# Patient Record
Sex: MALE | Race: OTHER | HISPANIC OR LATINO | Employment: OTHER | URBAN - METROPOLITAN AREA
[De-identification: names, ages, dates, MRNs, and addresses within clinical notes are randomized per-mention and may not be internally consistent; named-entity substitution may affect disease eponyms.]

---

## 2019-05-14 ENCOUNTER — APPOINTMENT (EMERGENCY)
Dept: CT IMAGING | Facility: HOSPITAL | Age: 79
End: 2019-05-14
Payer: MEDICARE

## 2019-05-14 ENCOUNTER — HOSPITAL ENCOUNTER (EMERGENCY)
Facility: HOSPITAL | Age: 79
End: 2019-05-14
Attending: EMERGENCY MEDICINE
Payer: MEDICARE

## 2019-05-14 ENCOUNTER — APPOINTMENT (EMERGENCY)
Dept: RADIOLOGY | Facility: HOSPITAL | Age: 79
End: 2019-05-14
Payer: MEDICARE

## 2019-05-14 VITALS
OXYGEN SATURATION: 95 % | HEART RATE: 74 BPM | RESPIRATION RATE: 21 BRPM | DIASTOLIC BLOOD PRESSURE: 69 MMHG | WEIGHT: 180 LBS | TEMPERATURE: 97 F | SYSTOLIC BLOOD PRESSURE: 113 MMHG

## 2019-05-14 DIAGNOSIS — D63.8 ANEMIA, CHRONIC DISEASE: ICD-10-CM

## 2019-05-14 DIAGNOSIS — R19.5 HEME POSITIVE STOOL: ICD-10-CM

## 2019-05-14 DIAGNOSIS — I21.4 NSTEMI (NON-ST ELEVATED MYOCARDIAL INFARCTION) (HCC): ICD-10-CM

## 2019-05-14 DIAGNOSIS — I50.9 CHF (CONGESTIVE HEART FAILURE) (HCC): ICD-10-CM

## 2019-05-14 DIAGNOSIS — C90.00 MULTIPLE MYELOMA (HCC): ICD-10-CM

## 2019-05-14 DIAGNOSIS — R09.02 HYPOXIA: ICD-10-CM

## 2019-05-14 DIAGNOSIS — I95.9 HYPOTENSION: ICD-10-CM

## 2019-05-14 DIAGNOSIS — R53.1 GENERALIZED WEAKNESS: Primary | ICD-10-CM

## 2019-05-14 LAB
ALBUMIN SERPL BCP-MCNC: 2.9 G/DL (ref 3–5.2)
ALP SERPL-CCNC: 49 U/L (ref 43–122)
ALT SERPL W P-5'-P-CCNC: 18 U/L (ref 9–52)
ANION GAP SERPL CALCULATED.3IONS-SCNC: 9 MMOL/L (ref 5–14)
ANISOCYTOSIS BLD QL SMEAR: PRESENT
APTT PPP: 28 SECONDS (ref 23–34)
AST SERPL W P-5'-P-CCNC: 17 U/L (ref 17–59)
BILIRUB SERPL-MCNC: 0.5 MG/DL
BUN SERPL-MCNC: 57 MG/DL (ref 5–25)
CALCIUM SERPL-MCNC: 8.4 MG/DL (ref 8.4–10.2)
CHLORIDE SERPL-SCNC: 102 MMOL/L (ref 97–108)
CO2 SERPL-SCNC: 22 MMOL/L (ref 22–30)
CREAT SERPL-MCNC: 1.67 MG/DL (ref 0.7–1.5)
ERYTHROCYTE [DISTWIDTH] IN BLOOD BY AUTOMATED COUNT: 21.5 %
GFR SERPL CREATININE-BSD FRML MDRD: 39 ML/MIN/1.73SQ M
GLUCOSE SERPL-MCNC: 98 MG/DL (ref 70–99)
HCT VFR BLD AUTO: 26.9 % (ref 41–53)
HGB BLD-MCNC: 8.2 G/DL (ref 13.5–17.5)
HYPERCHROMIA BLD QL SMEAR: PRESENT
INR PPP: 1.12 (ref 0.89–1.1)
LYMPHOCYTES # BLD AUTO: 0.12 THOUSAND/UL (ref 0.5–4)
LYMPHOCYTES # BLD AUTO: 3 % (ref 25–45)
MCH RBC QN AUTO: 31.6 PG (ref 26–34)
MCHC RBC AUTO-ENTMCNC: 30.3 G/DL (ref 31–36)
MCV RBC AUTO: 104 FL (ref 80–100)
METAMYELOCYTES NFR BLD MANUAL: 2 % (ref 0–1)
MONOCYTES # BLD AUTO: 0.24 THOUSAND/UL (ref 0.2–0.9)
MONOCYTES NFR BLD AUTO: 6 % (ref 1–10)
NEUTS BAND NFR BLD MANUAL: 30 % (ref 0–8)
NEUTS SEG # BLD: 3.56 THOUSAND/UL (ref 1.8–7.8)
NEUTS SEG NFR BLD AUTO: 59 %
NRBC BLD AUTO-RTO: 1 /100 WBC (ref 0–2)
NT-PROBNP SERPL-MCNC: 1740 PG/ML (ref 0–299)
PLATELET # BLD AUTO: 88 THOUSANDS/UL (ref 150–450)
PLATELET BLD QL SMEAR: ABNORMAL
PMV BLD AUTO: 11.6 FL (ref 8.9–12.7)
POIKILOCYTOSIS BLD QL SMEAR: PRESENT
POTASSIUM SERPL-SCNC: 3.8 MMOL/L (ref 3.6–5)
PROT SERPL-MCNC: 6.5 G/DL (ref 5.9–8.4)
PROTHROMBIN TIME: 11.8 SECONDS (ref 9.5–11.6)
RBC # BLD AUTO: 2.58 MILLION/UL (ref 4.5–5.9)
RBC MORPH BLD: ABNORMAL
SODIUM SERPL-SCNC: 133 MMOL/L (ref 137–147)
TOTAL CELLS COUNTED SPEC: 100
TROPONIN I SERPL-MCNC: 0.1 NG/ML (ref 0–0.03)
WBC # BLD AUTO: 4 THOUSAND/UL (ref 4.5–11)
WBC TOXIC VACUOLES BLD QL SMEAR: PRESENT

## 2019-05-14 PROCEDURE — 36415 COLL VENOUS BLD VENIPUNCTURE: CPT | Performed by: EMERGENCY MEDICINE

## 2019-05-14 PROCEDURE — 70450 CT HEAD/BRAIN W/O DYE: CPT

## 2019-05-14 PROCEDURE — 83880 ASSAY OF NATRIURETIC PEPTIDE: CPT | Performed by: EMERGENCY MEDICINE

## 2019-05-14 PROCEDURE — 85610 PROTHROMBIN TIME: CPT | Performed by: EMERGENCY MEDICINE

## 2019-05-14 PROCEDURE — 80053 COMPREHEN METABOLIC PANEL: CPT | Performed by: EMERGENCY MEDICINE

## 2019-05-14 PROCEDURE — 85027 COMPLETE CBC AUTOMATED: CPT | Performed by: EMERGENCY MEDICINE

## 2019-05-14 PROCEDURE — 84484 ASSAY OF TROPONIN QUANT: CPT | Performed by: EMERGENCY MEDICINE

## 2019-05-14 PROCEDURE — 71045 X-RAY EXAM CHEST 1 VIEW: CPT

## 2019-05-14 PROCEDURE — 85007 BL SMEAR W/DIFF WBC COUNT: CPT | Performed by: EMERGENCY MEDICINE

## 2019-05-14 PROCEDURE — 93005 ELECTROCARDIOGRAM TRACING: CPT

## 2019-05-14 PROCEDURE — 99291 CRITICAL CARE FIRST HOUR: CPT

## 2019-05-14 PROCEDURE — 85730 THROMBOPLASTIN TIME PARTIAL: CPT | Performed by: EMERGENCY MEDICINE

## 2019-05-14 PROCEDURE — 99285 EMERGENCY DEPT VISIT HI MDM: CPT | Performed by: EMERGENCY MEDICINE

## 2019-05-14 PROCEDURE — 96361 HYDRATE IV INFUSION ADD-ON: CPT

## 2019-05-14 PROCEDURE — 96374 THER/PROPH/DIAG INJ IV PUSH: CPT

## 2019-05-14 RX ORDER — LANOLIN ALCOHOL/MO/W.PET/CERES
1 CREAM (GRAM) TOPICAL DAILY
COMMUNITY

## 2019-05-14 RX ORDER — ASPIRIN 81 MG/1
81 TABLET, CHEWABLE ORAL DAILY
COMMUNITY

## 2019-05-14 RX ORDER — PROCHLORPERAZINE MALEATE 10 MG
10 TABLET ORAL EVERY 6 HOURS PRN
COMMUNITY
Start: 2019-04-29 | End: 2021-05-05

## 2019-05-14 RX ORDER — DEXAMETHASONE 4 MG/1
TABLET ORAL
COMMUNITY
Start: 2019-03-19 | End: 2021-05-05

## 2019-05-14 RX ORDER — FUROSEMIDE 10 MG/ML
40 INJECTION INTRAMUSCULAR; INTRAVENOUS ONCE
Status: DISCONTINUED | OUTPATIENT
Start: 2019-05-14 | End: 2019-05-14

## 2019-05-14 RX ORDER — FUROSEMIDE 10 MG/ML
20 INJECTION INTRAMUSCULAR; INTRAVENOUS ONCE
Status: COMPLETED | OUTPATIENT
Start: 2019-05-14 | End: 2019-05-14

## 2019-05-14 RX ORDER — ACYCLOVIR 400 MG/1
400 TABLET ORAL 2 TIMES DAILY
COMMUNITY
Start: 2018-12-17 | End: 2021-05-05

## 2019-05-14 RX ORDER — MORPHINE SULFATE 15 MG/1
TABLET, FILM COATED, EXTENDED RELEASE ORAL 2 TIMES DAILY
COMMUNITY
Start: 2019-04-26 | End: 2021-05-05

## 2019-05-14 RX ORDER — OMEPRAZOLE 40 MG/1
40 CAPSULE, DELAYED RELEASE ORAL DAILY
COMMUNITY
Start: 2019-04-27 | End: 2021-05-05

## 2019-05-14 RX ADMIN — FUROSEMIDE 20 MG: 10 INJECTION, SOLUTION INTRAMUSCULAR; INTRAVENOUS at 15:57

## 2019-05-14 RX ADMIN — SODIUM CHLORIDE 1000 ML: 9 INJECTION, SOLUTION INTRAVENOUS at 11:40

## 2019-05-15 LAB
ATRIAL RATE: 83 BPM
P AXIS: 13 DEGREES
PR INTERVAL: 142 MS
QRS AXIS: 20 DEGREES
QRSD INTERVAL: 92 MS
QT INTERVAL: 390 MS
QTC INTERVAL: 458 MS
T WAVE AXIS: 6 DEGREES
VENTRICULAR RATE: 83 BPM

## 2019-05-15 PROCEDURE — 93010 ELECTROCARDIOGRAM REPORT: CPT | Performed by: INTERNAL MEDICINE

## 2021-02-15 ENCOUNTER — HOSPITAL ENCOUNTER (INPATIENT)
Facility: HOSPITAL | Age: 81
LOS: 1 days | Discharge: HOME | DRG: 309 | End: 2021-02-16
Attending: INTERNAL MEDICINE | Admitting: INTERNAL MEDICINE
Payer: MEDICARE

## 2021-02-15 ENCOUNTER — APPOINTMENT (EMERGENCY)
Dept: RADIOLOGY | Facility: HOSPITAL | Age: 81
DRG: 309 | End: 2021-02-15
Payer: MEDICARE

## 2021-02-15 ENCOUNTER — HOSPITAL ENCOUNTER (EMERGENCY)
Facility: HOSPITAL | Age: 81
DRG: 309 | End: 2021-02-15
Attending: EMERGENCY MEDICINE
Payer: MEDICARE

## 2021-02-15 VITALS
TEMPERATURE: 98.1 F | DIASTOLIC BLOOD PRESSURE: 80 MMHG | HEART RATE: 72 BPM | OXYGEN SATURATION: 100 % | RESPIRATION RATE: 16 BRPM | SYSTOLIC BLOOD PRESSURE: 142 MMHG | WEIGHT: 179.2 LBS

## 2021-02-15 DIAGNOSIS — I48.91 ATRIAL FIBRILLATION WITH RVR (HCC): Primary | ICD-10-CM

## 2021-02-15 DIAGNOSIS — I47.2 VENTRICULAR TACHYARRHYTHMIA (HCC): Primary | ICD-10-CM

## 2021-02-15 LAB
ANION GAP SERPL CALCULATED.3IONS-SCNC: 6 MMOL/L (ref 5–14)
APTT PPP: 30 SECONDS (ref 23–37)
BUN SERPL-MCNC: 37 MG/DL (ref 5–25)
CALCIUM SERPL-MCNC: 9.3 MG/DL (ref 8.4–10.2)
CHLORIDE SERPL-SCNC: 105 MMOL/L (ref 97–108)
CO2 SERPL-SCNC: 29 MMOL/L (ref 22–30)
CREAT SERPL-MCNC: 1.32 MG/DL (ref 0.7–1.5)
EOSINOPHIL # BLD AUTO: 0.2 THOUSAND/UL (ref 0–0.4)
EOSINOPHIL NFR BLD MANUAL: 3 % (ref 0–6)
ERYTHROCYTE [DISTWIDTH] IN BLOOD BY AUTOMATED COUNT: 17.5 %
GFR SERPL CREATININE-BSD FRML MDRD: 51 ML/MIN/1.73SQ M
GLUCOSE SERPL-MCNC: 100 MG/DL (ref 70–99)
HCT VFR BLD AUTO: 30.9 % (ref 41–53)
HGB BLD-MCNC: 9.9 G/DL (ref 13.5–17.5)
INR PPP: 0.98 (ref 0.84–1.19)
LYMPHOCYTES # BLD AUTO: 2.01 THOUSAND/UL (ref 0.5–4)
LYMPHOCYTES # BLD AUTO: 30 % (ref 25–45)
MCH RBC QN AUTO: 29 PG (ref 26–34)
MCHC RBC AUTO-ENTMCNC: 32.1 G/DL (ref 31–36)
MCV RBC AUTO: 91 FL (ref 80–100)
MONOCYTES # BLD AUTO: 1.01 THOUSAND/UL (ref 0.2–0.9)
MONOCYTES NFR BLD AUTO: 15 % (ref 1–10)
NEUTS BAND NFR BLD MANUAL: 1 % (ref 0–8)
NEUTS SEG # BLD: 3.48 THOUSAND/UL (ref 1.8–7.8)
NEUTS SEG NFR BLD AUTO: 51 %
NT-PROBNP SERPL-MCNC: 449 PG/ML (ref 0–299)
PLATELET # BLD AUTO: 256 THOUSANDS/UL (ref 150–450)
PLATELET BLD QL SMEAR: ADEQUATE
PMV BLD AUTO: 9.6 FL (ref 8.9–12.7)
POTASSIUM SERPL-SCNC: 4.4 MMOL/L (ref 3.6–5)
PROTHROMBIN TIME: 13.1 SECONDS (ref 11.6–14.5)
RBC # BLD AUTO: 3.42 MILLION/UL (ref 4.5–5.9)
RBC MORPH BLD: NORMAL
SODIUM SERPL-SCNC: 140 MMOL/L (ref 137–147)
TOTAL CELLS COUNTED SPEC: 100
TROPONIN I SERPL-MCNC: <0.01 NG/ML (ref 0–0.03)
WBC # BLD AUTO: 6.7 THOUSAND/UL (ref 4.5–11)

## 2021-02-15 PROCEDURE — 99285 EMERGENCY DEPT VISIT HI MDM: CPT | Performed by: EMERGENCY MEDICINE

## 2021-02-15 PROCEDURE — 36415 COLL VENOUS BLD VENIPUNCTURE: CPT | Performed by: EMERGENCY MEDICINE

## 2021-02-15 PROCEDURE — 80048 BASIC METABOLIC PNL TOTAL CA: CPT | Performed by: EMERGENCY MEDICINE

## 2021-02-15 PROCEDURE — 84484 ASSAY OF TROPONIN QUANT: CPT | Performed by: EMERGENCY MEDICINE

## 2021-02-15 PROCEDURE — 96365 THER/PROPH/DIAG IV INF INIT: CPT

## 2021-02-15 PROCEDURE — 85730 THROMBOPLASTIN TIME PARTIAL: CPT | Performed by: EMERGENCY MEDICINE

## 2021-02-15 PROCEDURE — 93005 ELECTROCARDIOGRAM TRACING: CPT

## 2021-02-15 PROCEDURE — 0241U HB NFCT DS VIR RESP RNA 4 TRGT: CPT | Performed by: PHYSICIAN ASSISTANT

## 2021-02-15 PROCEDURE — 85007 BL SMEAR W/DIFF WBC COUNT: CPT | Performed by: EMERGENCY MEDICINE

## 2021-02-15 PROCEDURE — 85610 PROTHROMBIN TIME: CPT | Performed by: EMERGENCY MEDICINE

## 2021-02-15 PROCEDURE — 96366 THER/PROPH/DIAG IV INF ADDON: CPT

## 2021-02-15 PROCEDURE — 83880 ASSAY OF NATRIURETIC PEPTIDE: CPT | Performed by: EMERGENCY MEDICINE

## 2021-02-15 PROCEDURE — 85027 COMPLETE CBC AUTOMATED: CPT | Performed by: EMERGENCY MEDICINE

## 2021-02-15 PROCEDURE — 99285 EMERGENCY DEPT VISIT HI MDM: CPT

## 2021-02-15 PROCEDURE — 71045 X-RAY EXAM CHEST 1 VIEW: CPT

## 2021-02-15 PROCEDURE — 96367 TX/PROPH/DG ADDL SEQ IV INF: CPT

## 2021-02-15 RX ORDER — SENNA AND DOCUSATE SODIUM 50; 8.6 MG/1; MG/1
1 TABLET, FILM COATED ORAL DAILY
COMMUNITY
End: 2021-05-05

## 2021-02-15 RX ORDER — MAGNESIUM SULFATE HEPTAHYDRATE 40 MG/ML
2 INJECTION, SOLUTION INTRAVENOUS ONCE
Status: COMPLETED | OUTPATIENT
Start: 2021-02-15 | End: 2021-02-15

## 2021-02-15 RX ORDER — OXYCODONE HYDROCHLORIDE AND ACETAMINOPHEN 5; 325 MG/1; MG/1
1 TABLET ORAL EVERY 4 HOURS PRN
COMMUNITY
End: 2021-05-05

## 2021-02-15 RX ADMIN — DEXTROSE 240 MG: 50 INJECTION, SOLUTION INTRAVENOUS at 20:37

## 2021-02-15 RX ADMIN — AMIODARONE HYDROCHLORIDE 1 MG/MIN: 50 INJECTION, SOLUTION INTRAVENOUS at 21:05

## 2021-02-15 RX ADMIN — MAGNESIUM SULFATE IN WATER 2 G: 40 INJECTION, SOLUTION INTRAVENOUS at 21:12

## 2021-02-16 ENCOUNTER — APPOINTMENT (INPATIENT)
Dept: NON INVASIVE DIAGNOSTICS | Facility: HOSPITAL | Age: 81
DRG: 309 | End: 2021-02-16
Payer: MEDICARE

## 2021-02-16 VITALS
SYSTOLIC BLOOD PRESSURE: 150 MMHG | OXYGEN SATURATION: 96 % | RESPIRATION RATE: 18 BRPM | BODY MASS INDEX: 29.78 KG/M2 | WEIGHT: 161.82 LBS | DIASTOLIC BLOOD PRESSURE: 79 MMHG | HEIGHT: 62 IN | HEART RATE: 65 BPM | TEMPERATURE: 97.6 F

## 2021-02-16 PROBLEM — I48.0 PAROXYSMAL ATRIAL FIBRILLATION (HCC): Status: ACTIVE | Noted: 2021-02-16

## 2021-02-16 PROBLEM — K21.9 HIATAL HERNIA WITH GERD: Status: ACTIVE | Noted: 2017-05-16

## 2021-02-16 PROBLEM — I49.3 FREQUENT PVCS: Status: ACTIVE | Noted: 2021-02-16

## 2021-02-16 PROBLEM — I10 ESSENTIAL HYPERTENSION: Status: ACTIVE | Noted: 2019-01-25

## 2021-02-16 PROBLEM — K44.9 HIATAL HERNIA WITH GERD: Status: ACTIVE | Noted: 2017-05-16

## 2021-02-16 LAB
ALBUMIN SERPL BCP-MCNC: 3.3 G/DL (ref 3.5–5)
ALP SERPL-CCNC: 60 U/L (ref 46–116)
ALT SERPL W P-5'-P-CCNC: 17 U/L (ref 12–78)
ANION GAP SERPL CALCULATED.3IONS-SCNC: 4 MMOL/L (ref 4–13)
ANION GAP SERPL CALCULATED.3IONS-SCNC: 4 MMOL/L (ref 4–13)
AST SERPL W P-5'-P-CCNC: 13 U/L (ref 5–45)
ATRIAL RATE: 106 BPM
BACTERIA UR QL AUTO: ABNORMAL /HPF
BASOPHILS # BLD AUTO: 0.02 THOUSANDS/ΜL (ref 0–0.1)
BASOPHILS # BLD AUTO: 0.02 THOUSANDS/ΜL (ref 0–0.1)
BASOPHILS NFR BLD AUTO: 0 % (ref 0–1)
BASOPHILS NFR BLD AUTO: 0 % (ref 0–1)
BILIRUB SERPL-MCNC: 0.26 MG/DL (ref 0.2–1)
BILIRUB UR QL STRIP: NEGATIVE
BUN SERPL-MCNC: 27 MG/DL (ref 5–25)
BUN SERPL-MCNC: 30 MG/DL (ref 5–25)
CALCIUM ALBUM COR SERPL-MCNC: 9.6 MG/DL (ref 8.3–10.1)
CALCIUM SERPL-MCNC: 8.6 MG/DL (ref 8.3–10.1)
CALCIUM SERPL-MCNC: 9 MG/DL (ref 8.3–10.1)
CHLORIDE SERPL-SCNC: 108 MMOL/L (ref 100–108)
CHLORIDE SERPL-SCNC: 111 MMOL/L (ref 100–108)
CLARITY UR: CLEAR
CO2 SERPL-SCNC: 27 MMOL/L (ref 21–32)
CO2 SERPL-SCNC: 28 MMOL/L (ref 21–32)
COLOR UR: YELLOW
CREAT SERPL-MCNC: 1.05 MG/DL (ref 0.6–1.3)
CREAT SERPL-MCNC: 1.2 MG/DL (ref 0.6–1.3)
EOSINOPHIL # BLD AUTO: 0.21 THOUSAND/ΜL (ref 0–0.61)
EOSINOPHIL # BLD AUTO: 0.25 THOUSAND/ΜL (ref 0–0.61)
EOSINOPHIL NFR BLD AUTO: 4 % (ref 0–6)
EOSINOPHIL NFR BLD AUTO: 4 % (ref 0–6)
ERYTHROCYTE [DISTWIDTH] IN BLOOD BY AUTOMATED COUNT: 15.9 % (ref 11.6–15.1)
ERYTHROCYTE [DISTWIDTH] IN BLOOD BY AUTOMATED COUNT: 16 % (ref 11.6–15.1)
EST. AVERAGE GLUCOSE BLD GHB EST-MCNC: 123 MG/DL
FLUAV RNA RESP QL NAA+PROBE: NEGATIVE
FLUBV RNA RESP QL NAA+PROBE: NEGATIVE
GFR SERPL CREATININE-BSD FRML MDRD: 57 ML/MIN/1.73SQ M
GFR SERPL CREATININE-BSD FRML MDRD: 67 ML/MIN/1.73SQ M
GLUCOSE SERPL-MCNC: 106 MG/DL (ref 65–140)
GLUCOSE SERPL-MCNC: 92 MG/DL (ref 65–140)
GLUCOSE UR STRIP-MCNC: NEGATIVE MG/DL
HBA1C MFR BLD: 5.9 %
HCT VFR BLD AUTO: 29.8 % (ref 36.5–49.3)
HCT VFR BLD AUTO: 30.5 % (ref 36.5–49.3)
HGB BLD-MCNC: 9.2 G/DL (ref 12–17)
HGB BLD-MCNC: 9.5 G/DL (ref 12–17)
HGB UR QL STRIP.AUTO: ABNORMAL
HYALINE CASTS #/AREA URNS LPF: ABNORMAL /LPF
IMM GRANULOCYTES # BLD AUTO: 0.01 THOUSAND/UL (ref 0–0.2)
IMM GRANULOCYTES # BLD AUTO: 0.02 THOUSAND/UL (ref 0–0.2)
IMM GRANULOCYTES NFR BLD AUTO: 0 % (ref 0–2)
IMM GRANULOCYTES NFR BLD AUTO: 0 % (ref 0–2)
KETONES UR STRIP-MCNC: NEGATIVE MG/DL
LEUKOCYTE ESTERASE UR QL STRIP: NEGATIVE
LYMPHOCYTES # BLD AUTO: 0.91 THOUSANDS/ΜL (ref 0.6–4.47)
LYMPHOCYTES # BLD AUTO: 0.98 THOUSANDS/ΜL (ref 0.6–4.47)
LYMPHOCYTES NFR BLD AUTO: 15 % (ref 14–44)
LYMPHOCYTES NFR BLD AUTO: 15 % (ref 14–44)
MAGNESIUM SERPL-MCNC: 2.5 MG/DL (ref 1.6–2.6)
MAGNESIUM SERPL-MCNC: 2.6 MG/DL (ref 1.6–2.6)
MCH RBC QN AUTO: 28.8 PG (ref 26.8–34.3)
MCH RBC QN AUTO: 29.3 PG (ref 26.8–34.3)
MCHC RBC AUTO-ENTMCNC: 30.9 G/DL (ref 31.4–37.4)
MCHC RBC AUTO-ENTMCNC: 31.1 G/DL (ref 31.4–37.4)
MCV RBC AUTO: 93 FL (ref 82–98)
MCV RBC AUTO: 94 FL (ref 82–98)
MONOCYTES # BLD AUTO: 1.3 THOUSAND/ΜL (ref 0.17–1.22)
MONOCYTES # BLD AUTO: 1.35 THOUSAND/ΜL (ref 0.17–1.22)
MONOCYTES NFR BLD AUTO: 20 % (ref 4–12)
MONOCYTES NFR BLD AUTO: 22 % (ref 4–12)
NEUTROPHILS # BLD AUTO: 3.57 THOUSANDS/ΜL (ref 1.85–7.62)
NEUTROPHILS # BLD AUTO: 4.08 THOUSANDS/ΜL (ref 1.85–7.62)
NEUTS SEG NFR BLD AUTO: 59 % (ref 43–75)
NEUTS SEG NFR BLD AUTO: 61 % (ref 43–75)
NITRITE UR QL STRIP: NEGATIVE
NON-SQ EPI CELLS URNS QL MICRO: ABNORMAL /HPF
NRBC BLD AUTO-RTO: 0 /100 WBCS
NRBC BLD AUTO-RTO: 0 /100 WBCS
PH UR STRIP.AUTO: 7 [PH]
PHOSPHATE SERPL-MCNC: 3.1 MG/DL (ref 2.3–4.1)
PHOSPHATE SERPL-MCNC: 3.4 MG/DL (ref 2.3–4.1)
PLATELET # BLD AUTO: 218 THOUSANDS/UL (ref 149–390)
PLATELET # BLD AUTO: 223 THOUSANDS/UL (ref 149–390)
PMV BLD AUTO: 11.3 FL (ref 8.9–12.7)
PMV BLD AUTO: 11.6 FL (ref 8.9–12.7)
POTASSIUM SERPL-SCNC: 3.7 MMOL/L (ref 3.5–5.3)
POTASSIUM SERPL-SCNC: 4 MMOL/L (ref 3.5–5.3)
PR INTERVAL: 168 MS
PROCALCITONIN SERPL-MCNC: <0.05 NG/ML
PROCALCITONIN SERPL-MCNC: <0.05 NG/ML
PROT SERPL-MCNC: 7.4 G/DL (ref 6.4–8.2)
PROT UR STRIP-MCNC: NEGATIVE MG/DL
QRS AXIS: 23 DEGREES
QRSD INTERVAL: 88 MS
QT INTERVAL: 316 MS
QTC INTERVAL: 419 MS
RBC # BLD AUTO: 3.2 MILLION/UL (ref 3.88–5.62)
RBC # BLD AUTO: 3.24 MILLION/UL (ref 3.88–5.62)
RBC #/AREA URNS AUTO: ABNORMAL /HPF
RSV RNA RESP QL NAA+PROBE: NEGATIVE
SARS-COV-2 RNA RESP QL NAA+PROBE: NEGATIVE
SODIUM SERPL-SCNC: 140 MMOL/L (ref 136–145)
SODIUM SERPL-SCNC: 142 MMOL/L (ref 136–145)
SP GR UR STRIP.AUTO: 1.01 (ref 1–1.03)
T WAVE AXIS: 62 DEGREES
TROPONIN I SERPL-MCNC: <0.02 NG/ML
TSH SERPL DL<=0.05 MIU/L-ACNC: 1.19 UIU/ML (ref 0.36–3.74)
UROBILINOGEN UR QL STRIP.AUTO: 0.2 E.U./DL
VENTRICULAR RATE: 106 BPM
WBC # BLD AUTO: 6.03 THOUSAND/UL (ref 4.31–10.16)
WBC # BLD AUTO: 6.69 THOUSAND/UL (ref 4.31–10.16)
WBC #/AREA URNS AUTO: ABNORMAL /HPF

## 2021-02-16 PROCEDURE — 80048 BASIC METABOLIC PNL TOTAL CA: CPT | Performed by: PHYSICIAN ASSISTANT

## 2021-02-16 PROCEDURE — 84100 ASSAY OF PHOSPHORUS: CPT | Performed by: PHYSICIAN ASSISTANT

## 2021-02-16 PROCEDURE — 85025 COMPLETE CBC W/AUTO DIFF WBC: CPT | Performed by: PHYSICIAN ASSISTANT

## 2021-02-16 PROCEDURE — 83735 ASSAY OF MAGNESIUM: CPT | Performed by: PHYSICIAN ASSISTANT

## 2021-02-16 PROCEDURE — 93306 TTE W/DOPPLER COMPLETE: CPT | Performed by: INTERNAL MEDICINE

## 2021-02-16 PROCEDURE — NC001 PR NO CHARGE: Performed by: INTERNAL MEDICINE

## 2021-02-16 PROCEDURE — 93010 ELECTROCARDIOGRAM REPORT: CPT | Performed by: INTERNAL MEDICINE

## 2021-02-16 PROCEDURE — 84484 ASSAY OF TROPONIN QUANT: CPT | Performed by: PHYSICIAN ASSISTANT

## 2021-02-16 PROCEDURE — 99291 CRITICAL CARE FIRST HOUR: CPT | Performed by: PHYSICIAN ASSISTANT

## 2021-02-16 PROCEDURE — 99239 HOSP IP/OBS DSCHRG MGMT >30: CPT | Performed by: INTERNAL MEDICINE

## 2021-02-16 PROCEDURE — 84145 PROCALCITONIN (PCT): CPT | Performed by: PHYSICIAN ASSISTANT

## 2021-02-16 PROCEDURE — 83036 HEMOGLOBIN GLYCOSYLATED A1C: CPT | Performed by: STUDENT IN AN ORGANIZED HEALTH CARE EDUCATION/TRAINING PROGRAM

## 2021-02-16 PROCEDURE — 84443 ASSAY THYROID STIM HORMONE: CPT | Performed by: PHYSICIAN ASSISTANT

## 2021-02-16 PROCEDURE — 93306 TTE W/DOPPLER COMPLETE: CPT

## 2021-02-16 PROCEDURE — 80053 COMPREHEN METABOLIC PANEL: CPT | Performed by: PHYSICIAN ASSISTANT

## 2021-02-16 PROCEDURE — 81001 URINALYSIS AUTO W/SCOPE: CPT | Performed by: PHYSICIAN ASSISTANT

## 2021-02-16 PROCEDURE — 99221 1ST HOSP IP/OBS SF/LOW 40: CPT | Performed by: INTERNAL MEDICINE

## 2021-02-16 RX ORDER — MORPHINE SULFATE 15 MG/1
15 TABLET, FILM COATED, EXTENDED RELEASE ORAL 2 TIMES DAILY
Status: DISCONTINUED | OUTPATIENT
Start: 2021-02-16 | End: 2021-02-16 | Stop reason: HOSPADM

## 2021-02-16 RX ORDER — ASPIRIN 81 MG/1
81 TABLET, CHEWABLE ORAL DAILY
Status: DISCONTINUED | OUTPATIENT
Start: 2021-02-16 | End: 2021-02-16 | Stop reason: HOSPADM

## 2021-02-16 RX ORDER — OXYCODONE HYDROCHLORIDE AND ACETAMINOPHEN 5; 325 MG/1; MG/1
1 TABLET ORAL EVERY 4 HOURS PRN
Status: DISCONTINUED | OUTPATIENT
Start: 2021-02-16 | End: 2021-02-16

## 2021-02-16 RX ORDER — OXYCODONE HYDROCHLORIDE 5 MG/1
5 TABLET ORAL EVERY 4 HOURS PRN
Status: DISCONTINUED | OUTPATIENT
Start: 2021-02-16 | End: 2021-02-16 | Stop reason: HOSPADM

## 2021-02-16 RX ORDER — PROCHLORPERAZINE MALEATE 10 MG
10 TABLET ORAL EVERY 6 HOURS PRN
Status: DISCONTINUED | OUTPATIENT
Start: 2021-02-16 | End: 2021-02-16

## 2021-02-16 RX ORDER — PANTOPRAZOLE SODIUM 40 MG/1
40 TABLET, DELAYED RELEASE ORAL
Status: DISCONTINUED | OUTPATIENT
Start: 2021-02-16 | End: 2021-02-16 | Stop reason: HOSPADM

## 2021-02-16 RX ORDER — ACYCLOVIR 200 MG/1
400 CAPSULE ORAL 2 TIMES DAILY
Status: DISCONTINUED | OUTPATIENT
Start: 2021-02-16 | End: 2021-02-16 | Stop reason: HOSPADM

## 2021-02-16 RX ORDER — METOPROLOL TARTRATE 50 MG/1
50 TABLET, FILM COATED ORAL 2 TIMES DAILY
Status: DISCONTINUED | OUTPATIENT
Start: 2021-02-16 | End: 2021-02-16

## 2021-02-16 RX ORDER — ONDANSETRON 2 MG/ML
4 INJECTION INTRAMUSCULAR; INTRAVENOUS EVERY 6 HOURS PRN
Status: DISCONTINUED | OUTPATIENT
Start: 2021-02-16 | End: 2021-02-16 | Stop reason: HOSPADM

## 2021-02-16 RX ORDER — PROCHLORPERAZINE MALEATE 10 MG
10 TABLET ORAL EVERY 6 HOURS PRN
Status: DISCONTINUED | OUTPATIENT
Start: 2021-02-16 | End: 2021-02-16 | Stop reason: HOSPADM

## 2021-02-16 RX ORDER — METOPROLOL TARTRATE 75 MG/1
75 TABLET, FILM COATED ORAL 2 TIMES DAILY
Qty: 60 TABLET | Refills: 0 | Status: SHIPPED | OUTPATIENT
Start: 2021-02-16 | End: 2021-05-05

## 2021-02-16 RX ORDER — POTASSIUM CHLORIDE 20 MEQ/1
40 TABLET, EXTENDED RELEASE ORAL ONCE
Status: COMPLETED | OUTPATIENT
Start: 2021-02-16 | End: 2021-02-16

## 2021-02-16 RX ORDER — ACETAMINOPHEN 325 MG/1
650 TABLET ORAL EVERY 6 HOURS PRN
Status: DISCONTINUED | OUTPATIENT
Start: 2021-02-16 | End: 2021-02-16 | Stop reason: HOSPADM

## 2021-02-16 RX ORDER — AMOXICILLIN 250 MG
1 CAPSULE ORAL DAILY
Status: DISCONTINUED | OUTPATIENT
Start: 2021-02-16 | End: 2021-02-16 | Stop reason: HOSPADM

## 2021-02-16 RX ADMIN — ACYCLOVIR 400 MG: 200 CAPSULE ORAL at 10:34

## 2021-02-16 RX ADMIN — DOCUSATE SODIUM AND SENNOSIDES 1 TABLET: 8.6; 5 TABLET ORAL at 08:39

## 2021-02-16 RX ADMIN — POTASSIUM CHLORIDE 40 MEQ: 1500 TABLET, EXTENDED RELEASE ORAL at 10:34

## 2021-02-16 RX ADMIN — METOPROLOL TARTRATE 50 MG: 50 TABLET, FILM COATED ORAL at 08:39

## 2021-02-16 RX ADMIN — MORPHINE SULFATE 15 MG: 15 TABLET, EXTENDED RELEASE ORAL at 08:39

## 2021-02-16 RX ADMIN — APIXABAN 5 MG: 5 TABLET, FILM COATED ORAL at 15:31

## 2021-02-16 RX ADMIN — ASPIRIN 81 MG: 81 TABLET, CHEWABLE ORAL at 08:39

## 2021-02-16 RX ADMIN — PANTOPRAZOLE SODIUM 40 MG: 40 TABLET, DELAYED RELEASE ORAL at 05:30

## 2021-02-16 RX ADMIN — ENOXAPARIN SODIUM 40 MG: 40 INJECTION SUBCUTANEOUS at 08:39

## 2021-02-16 RX ADMIN — AMIODARONE HYDROCHLORIDE 0.5 MG/MIN: 50 INJECTION, SOLUTION INTRAVENOUS at 06:16

## 2021-02-16 RX ADMIN — AMIODARONE HYDROCHLORIDE 1 MG/MIN: 50 INJECTION, SOLUTION INTRAVENOUS at 00:30

## 2021-02-16 NOTE — PLAN OF CARE
Problem: PAIN - ADULT  Goal: Verbalizes/displays adequate comfort level or baseline comfort level  Description: Interventions:  - Encourage patient to monitor pain and request assistance  - Assess pain using appropriate pain scale  - Administer analgesics based on type and severity of pain and evaluate response  - Implement non-pharmacological measures as appropriate and evaluate response  - Consider cultural and social influences on pain and pain management  - Notify physician/advanced practitioner if interventions unsuccessful or patient reports new pain  2/16/2021 1452 by Ladan Chen RN  Outcome: Adequate for Discharge  2/16/2021 1452 by Ladan Chen RN  Outcome: Adequate for Discharge     Problem: INFECTION - ADULT  Goal: Absence or prevention of progression during hospitalization  Description: INTERVENTIONS:  - Assess and monitor for signs and symptoms of infection  - Monitor lab/diagnostic results  - Monitor all insertion sites, i e  indwelling lines, tubes, and drains  - Monitor endotracheal if appropriate and nasal secretions for changes in amount and color  - [place] appropriate cooling/warming therapies per order  - Administer medications as ordered  - Instruct and encourage patient and family to use good hand hygiene technique  - Identify and instruct in appropriate isolation precautions for identified infection/condition  2/16/2021 1452 by Ladan Chen RN  Outcome: Adequate for Discharge  2/16/2021 1452 by Ladan Chen RN  Outcome: Adequate for Discharge  Goal: Absence of fever/infection during neutropenic period  Description: INTERVENTIONS:  - Monitor WBC    2/16/2021 1452 by Ladan Chen RN  Outcome: Adequate for Discharge  2/16/2021 1452 by Ladan Chen RN  Outcome: Adequate for Discharge     Problem: SAFETY ADULT  Goal: Patient will remain free of falls  Description: INTERVENTIONS:  - Assess patient frequently for physical needs  -  Identify cognitive and physical deficits and behaviors that affect risk of falls    -  [place] fall precautions as indicated by assessment   - Educate patient/family on patient safety including physical limitations  - Instruct patient to call for assistance with activity based on assessment  - Modify environment to reduce risk of injury  - Consider OT/PT consult to assist with strengthening/mobility  2/16/2021 1452 by Ashlee Ramirez RN  Outcome: Adequate for Discharge  2/16/2021 1452 by Ashlee Ramirez RN  Outcome: Adequate for Discharge  Goal: Maintain or return to baseline ADL function  Description: INTERVENTIONS:  -  Assess patient's ability to carry out ADLs; assess patient's baseline for ADL function and identify physical deficits which impact ability to perform ADLs (bathing, care of mouth/teeth, toileting, grooming, dressing, etc )  - Assess/evaluate cause of self-care deficits   - Assess range of motion  - Assess patient's mobility; develop plan if impaired  - Assess patient's need for assistive devices and provide as appropriate  - Encourage maximum independence but intervene and supervise when necessary  - Involve family in performance of ADLs  - Assess for home care needs following discharge   - Consider OT consult to assist with ADL evaluation and planning for discharge  - Provide patient education as appropriate  2/16/2021 1452 by Ashlee Ramirez RN  Outcome: Adequate for Discharge  2/16/2021 1452 by Ashlee Ramirez RN  Outcome: Adequate for Discharge  Goal: Maintain or return mobility status to optimal level  Description: INTERVENTIONS:  - Assess patient's baseline mobility status (ambulation, transfers, stairs, etc )    - Identify cognitive and physical deficits and behaviors that affect mobility  - Identify mobility aids required to assist with transfers and/or ambulation (gait belt, sit-to-stand, lift, walker, cane, etc )  - [place] fall precautions as indicated by assessment  - Record patient progress and toleration of activity level on Mobility SBAR; progress patient to next Phase/Stage  - Instruct patient to call for assistance with activity based on assessment  - Consider rehabilitation consult to assist with strengthening/weightbearing, etc   2/16/2021 1452 by Ashlee Ramirez RN  Outcome: Adequate for Discharge  2/16/2021 1452 by Ashlee Ramirez RN  Outcome: Adequate for Discharge     Problem: DISCHARGE PLANNING  Goal: Discharge to home or other facility with appropriate resources  Description: INTERVENTIONS:  - Identify barriers to discharge w/patient and caregiver  - Arrange for needed discharge resources and transportation as appropriate  - Identify discharge learning needs (meds, wound care, etc )  - Arrange for interpretive services to assist at discharge as needed  - Refer to Case Management Department for coordinating discharge planning if the patient needs post-hospital services based on physician/advanced practitioner order or complex needs related to functional status, cognitive ability, or social support system  2/16/2021 1452 by Ashlee Ramirez RN  Outcome: Adequate for Discharge  2/16/2021 1452 by Ashlee Ramirez RN  Outcome: Adequate for Discharge     Problem: Knowledge Deficit  Goal: Patient/family/caregiver demonstrates understanding of disease process, treatment plan, medications, and discharge instructions  Description: Complete learning assessment and assess knowledge base    Interventions:  - Provide teaching at level of understanding  - Provide teaching via preferred learning methods  2/16/2021 1452 by Ashlee Ramirez RN  Outcome: Adequate for Discharge  2/16/2021 1452 by Ashlee Ramirez RN  Outcome: Adequate for Discharge     Problem: NEUROSENSORY - ADULT  Goal: Achieves stable or improved neurological status  Description: INTERVENTIONS  - Monitor and report changes in neurological status  - Monitor vital signs such as temperature, blood pressure, glucose, and any other labs ordered   - Initiate measures to prevent increased intracranial pressure  - Monitor for seizure activity and implement precautions if appropriate      2/16/2021 1452 by Ladan Chen RN  Outcome: Adequate for Discharge  2/16/2021 1452 by Ladan Chen RN  Outcome: Adequate for Discharge  Goal: Remains free of injury related to seizures activity  Description: INTERVENTIONS  - Maintain airway, patient safety  and administer oxygen as ordered  - Monitor patient for seizure activity, document and report duration and description of seizure to physician/advanced practitioner  - If seizure occurs,  ensure patient safety during seizure  - Reorient patient post seizure  - Seizure pads on all 4 side rails  - Instruct patient/family to notify RN of any seizure activity including if an aura is experienced  - Instruct patient/family to call for assistance with activity based on nursing assessment  - Administer anti-seizure medications if ordered    2/16/2021 1452 by Ladan Chen RN  Outcome: Adequate for Discharge  2/16/2021 1452 by Ladan Chen RN  Outcome: Adequate for Discharge  Goal: Achieves maximal functionality and self care  Description: INTERVENTIONS  - Monitor swallowing and airway patency with patient fatigue and changes in neurological status  - Encourage and assist patient to increase activity and self care     - Encourage visually impaired, hearing impaired and aphasic patients to use assistive/communication devices  2/16/2021 1452 by Ladan Chen RN  Outcome: Adequate for Discharge  2/16/2021 1452 by Ladan Chen RN  Outcome: Adequate for Discharge     Problem: CARDIOVASCULAR - ADULT  Goal: Maintains optimal cardiac output and hemodynamic stability  Description: INTERVENTIONS:  - Monitor I/O, vital signs and rhythm  - Monitor for S/S and trends of decreased cardiac output  - Administer and titrate ordered vasoactive medications to optimize hemodynamic stability  - Assess quality of pulses, skin color and temperature  - Assess for signs of decreased coronary artery perfusion  - Instruct patient to report change in severity of symptoms  2/16/2021 1452 by Kirsten Gonzalez RN  Outcome: Adequate for Discharge  2/16/2021 1452 by Kirsten Gonzalez RN  Outcome: Adequate for Discharge  Goal: Absence of cardiac dysrhythmias or at baseline rhythm  Description: INTERVENTIONS:  - Continuous cardiac monitoring, vital signs, obtain 12 lead EKG if ordered  - Administer antiarrhythmic and heart rate control medications as ordered  - Monitor electrolytes and administer replacement therapy as ordered  2/16/2021 1452 by Kirsten Gonzalez RN  Outcome: Adequate for Discharge  2/16/2021 1452 by Kirsten Gonzalez RN  Outcome: Adequate for Discharge     Problem: RESPIRATORY - ADULT  Goal: Achieves optimal ventilation and oxygenation  Description: INTERVENTIONS:  - Assess for changes in respiratory status  - Assess for changes in mentation and behavior  - Position to facilitate oxygenation and minimize respiratory effort  - Oxygen administered by appropriate delivery if ordered  - Initiate smoking cessation education as indicated  - Encourage broncho-pulmonary hygiene including cough, deep breathe, Incentive Spirometry  - Assess the need for suctioning and aspirate as needed  - Assess and instruct to report SOB or any respiratory difficulty  - Respiratory Therapy support as indicated  2/16/2021 1452 by Kirsten Gonzalez RN  Outcome: Adequate for Discharge  2/16/2021 1452 by Kirsten Gonzalez RN  Outcome: Adequate for Discharge     Problem: GASTROINTESTINAL - ADULT  Goal: Minimal or absence of nausea and/or vomiting  Description: INTERVENTIONS:  - Administer IV fluids if ordered to ensure adequate hydration  - Maintain NPO status until nausea and vomiting are resolved  - Nasogastric tube if ordered  - Administer ordered antiemetic medications as needed  - Provide nonpharmacologic comfort measures as appropriate  - Advance diet as tolerated, if ordered  - Consider nutrition services referral to assist patient with adequate nutrition and appropriate food choices  2/16/2021 1452 by Darrell Harvey RN  Outcome: Adequate for Discharge  2/16/2021 1452 by Darrell Harvey RN  Outcome: Adequate for Discharge  Goal: Maintains or returns to baseline bowel function  Description: INTERVENTIONS:  - Assess bowel function  - Encourage oral fluids to ensure adequate hydration  - Administer IV fluids if ordered to ensure adequate hydration  - Administer ordered medications as needed  - Encourage mobilization and activity  - Consider nutritional services referral to assist patient with adequate nutrition and appropriate food choices  2/16/2021 1452 by Darrell Harvey RN  Outcome: Adequate for Discharge  2/16/2021 1452 by Drarell Harvey RN  Outcome: Adequate for Discharge  Goal: Maintains adequate nutritional intake  Description: INTERVENTIONS:  - Monitor percentage of each meal consumed  - Identify factors contributing to decreased intake, treat as appropriate  - Assist with meals as needed  - Monitor I&O, weight, and lab values if indicated  - Obtain nutrition services referral as needed  2/16/2021 1452 by Darrell Harvey RN  Outcome: Adequate for Discharge  2/16/2021 1452 by Darrell Harvey RN  Outcome: Adequate for Discharge     Problem: GENITOURINARY - ADULT  Goal: Maintains or returns to baseline urinary function  Description: INTERVENTIONS:  - Assess urinary function  - Encourage oral fluids to ensure adequate hydration if ordered  - Administer IV fluids as ordered to ensure adequate hydration  - Administer ordered medications as needed  - Offer frequent toileting  - Follow urinary retention protocol if ordered  2/16/2021 1452 by Darrell Harvey RN  Outcome: Adequate for Discharge  2/16/2021 1452 by Darrell Harvey RN  Outcome: Adequate for Discharge  Goal: Absence of urinary retention  Description: INTERVENTIONS:  - Assess patients ability to void and empty bladder  - Monitor I/O  - Bladder scan as needed  - Discuss with physician/AP medications to alleviate retention as needed  - Discuss catheterization for long term situations as appropriate  2/16/2021 1452 by Herson Sheikh RN  Outcome: Adequate for Discharge  2/16/2021 1452 by Herson Sheikh RN  Outcome: Adequate for Discharge     Problem: METABOLIC, FLUID AND ELECTROLYTES - ADULT  Goal: Electrolytes maintained within normal limits  Description: INTERVENTIONS:  - Monitor labs and assess patient for signs and symptoms of electrolyte imbalances  - Administer electrolyte replacement as ordered  - Monitor response to electrolyte replacements, including repeat lab results as appropriate  - Instruct patient on fluid and nutrition as appropriate  2/16/2021 1452 by Herson Sheikh RN  Outcome: Adequate for Discharge  2/16/2021 1452 by Herson Sheikh RN  Outcome: Adequate for Discharge  Goal: Fluid balance maintained  Description: INTERVENTIONS:  - Monitor labs   - Monitor I/O and WT  - Instruct patient on fluid and nutrition as appropriate  - Assess for signs & symptoms of volume excess or deficit  2/16/2021 1452 by Herson Sheikh RN  Outcome: Adequate for Discharge  2/16/2021 1452 by Herson Sheikh RN  Outcome: Adequate for Discharge  Goal: Glucose maintained within target range  Description: INTERVENTIONS:  - Monitor Blood Glucose as ordered  - Assess for signs and symptoms of hyperglycemia and hypoglycemia  - Administer ordered medications to maintain glucose within target range  - Assess nutritional intake and initiate nutrition service referral as needed  Outcome: Adequate for Discharge     Problem: SKIN/TISSUE INTEGRITY - ADULT  Goal: Skin integrity remains intact  Description: INTERVENTIONS  - Identify patients at risk for skin breakdown  - Assess and monitor skin integrity  - Assess and monitor nutrition and hydration status  - Monitor labs (i e  albumin)  - Assess for incontinence   - Turn and reposition patient  - Assist with mobility/ambulation  - Relieve pressure over bony prominences  - Avoid friction and shearing  - Provide appropriate hygiene as needed including keeping skin clean and dry  - Evaluate need for skin moisturizer/barrier cream  - Collaborate with interdisciplinary team (i e  Nutrition, Rehabilitation, etc )   - Patient/family teaching  Outcome: Adequate for Discharge  Goal: Incision(s), wounds(s) or drain site(s) healing without S/S of infection  Description: INTERVENTIONS  - Assess and document risk factors for skin impairment   - Assess and document dressing, incision, wound bed, drain sites and surrounding tissue  - Consider nutrition services referral as needed  - Oral mucous membranes remain intact  - Provide patient/ family education  Outcome: Adequate for Discharge  Goal: Oral mucous membranes remain intact  Description: INTERVENTIONS  - Assess oral mucosa and hygiene practices  - Implement preventative oral hygiene regimen  - Implement oral medicated treatments as ordered  - Initiate Nutrition services referral as needed  Outcome: Adequate for Discharge     Problem: HEMATOLOGIC - ADULT  Goal: Maintains hematologic stability  Description: INTERVENTIONS  - Assess for signs and symptoms of bleeding or hemorrhage  - Monitor labs  - Administer supportive blood products/factors as ordered and appropriate  Outcome: Adequate for Discharge     Problem: MUSCULOSKELETAL - ADULT  Goal: Maintain or return mobility to safest level of function  Description: INTERVENTIONS:  - Assess patient's ability to carry out ADLs; assess patient's baseline for ADL function and identify physical deficits which impact ability to perform ADLs (bathing, care of mouth/teeth, toileting, grooming, dressing, etc )  - Assess/evaluate cause of self-care deficits   - Assess range of motion  - Assess patient's mobility  - Assess patient's need for assistive devices and provide as appropriate  - Encourage maximum independence but intervene and supervise when necessary  - Involve family in performance of ADLs  - Assess for home care needs following discharge   - Consider OT consult to assist with ADL evaluation and planning for discharge  - Provide patient education as appropriate  Outcome: Adequate for Discharge  Goal: Maintain proper alignment of affected body part  Description: INTERVENTIONS:  - Support, maintain and protect limb and body alignment  - Provide patient/ family with appropriate education  Outcome: Adequate for Discharge     Problem: Potential for Falls  Goal: Patient will remain free of falls  Description: INTERVENTIONS:  - Assess patient frequently for physical needs  -  Identify cognitive and physical deficits and behaviors that affect risk of falls    -  [place] fall precautions as indicated by assessment   - Educate patient/family on patient safety including physical limitations  - Instruct patient to call for assistance with activity based on assessment  - Modify environment to reduce risk of injury  - Consider OT/PT consult to assist with strengthening/mobility  2/16/2021 1452 by Rossana Osorio RN  Outcome: Adequate for Discharge  2/16/2021 1452 by Rossana Osorio RN  Outcome: Adequate for Discharge

## 2021-02-16 NOTE — ASSESSMENT & PLAN NOTE
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

## 2021-02-16 NOTE — ED NOTES
[facility] transport at bedside moving pt to stretcher  This nurse gave report to EMS crew        Matias Morton RN  02/15/21 [id number]

## 2021-02-16 NOTE — EMTALA/ACUTE CARE TRANSFER
[facility] EMERGENCY DEPARTMENT  [address] [facility] [address]  [phone]  Dept: 628 hospitals TRANSFER CONSENT    NAME Ravi Carlisle                                         1940                              MRN [id number]    I have been informed of my rights regarding examination, treatment, and transfer   by Dr Renetta Lang MD    Benefits:      Risks:        Consent for Transfer:  I acknowledge that my medical condition has been evaluated and explained to me by the emergency department physician or other qualified medical person and/or my attending physician, who has recommended that I be transferred to the service of    at    The above potential benefits of such transfer, the potential risks associated with such transfer, and the probable risks of not being transferred have been explained to me, and I fully understand them  The doctor has explained that, in my case, the benefits of transfer outweigh the risks  I agree to be transferred  I authorize the performance of emergency medical procedures and treatments upon me in both transit and upon arrival at the receiving facility  Additionally, I authorize the release of any and all medical records to the receiving facility and request they be transported with me, if possible  I understand that the safest mode of transportation during a medical emergency is an ambulance and that the Hospital advocates the use of this mode of transport  Risks of traveling to the receiving facility by car, including absence of medical control, life sustaining equipment, such as oxygen, and medical personnel has been explained to me and I fully understand them  (LEYDI CORRECT BOX BELOW)  [  ]  I consent to the stated transfer and to be transported by ambulance/helicopter  [  ]  I consent to the stated transfer, but refuse transportation by ambulance and accept full responsibility for my transportation by car    I understand the risks of non-ambulance transfers and I exonerate the Hospital and its staff from any deterioration in my condition that results from this refusal     X___________________________________________    DATE  02/15/21  TIME________  Signature of patient or legally responsible individual signing on patient behalf           RELATIONSHIP TO PATIENT_________________________          Provider Certification    NAME Kenzie Romano                                         1940                              MRN [id number]    A medical screening exam was performed on the above named patient  Based on the examination:    Condition Necessitating Transfer The encounter diagnosis was Ventricular tachyarrhythmia ([address] )  Patient Condition:      Reason for Transfer:      Transfer Requirements: Facility     · Space available and qualified personnel available for treatment as acknowledged by    · Agreed to accept transfer and to provide appropriate medical treatment as acknowledged by          · Appropriate medical records of the examination and treatment of the patient are provided at the time of transfer   [address], [address] _______  · Transfer will be performed by qualified personnel from    and appropriate transfer equipment as required, including the use of necessary and appropriate life support measures      Provider Certification: I have examined the patient and explained the following risks and benefits of being transferred/refusing transfer to the patient/family:         Based on these reasonable risks and benefits to the patient and/or the unborn child(denise), and based upon the information available at the time of the patients examination, I certify that the medical benefits reasonably to be expected from the provision of appropriate medical treatments at another medical facility outweigh the increasing risks, if any, to the individuals medical condition, and in the case of labor to the unborn child, from effecting the transfer      X____________________________________________ DATE 02/15/21        TIME_______      ORIGINAL - SEND TO MEDICAL RECORDS   COPY - SEND WITH PATIENT DURING TRANSFER

## 2021-02-16 NOTE — CONSULTS
Consultation - General Cardiology Team 2  Leo Camara 80 y o  male MRN: [id number]  Unit/Bed#: [facility] 417-01 Encounter: [id number]      Consults        History of Present Illness   Physician Requesting Consult: Suhas Mcdowell MD  Reason for Consult / Principal Problem: AF RVR    HPI: Leo Camara is a 80y o  year old male with a history of HTN, LINDSEY, Multiple Myeloma, History of NSVT with Frequent PACS and PVCS (2019) who presented to the hospital with complaints of Chest Pain, dizziness, and SOB x2 day duration  States that symptoms began after he painted his house  Denies any Palpitations, Fevers, Chills, PND, Orthopnea or any other associated complaints  Patient was seen at [facility] and was found to have runs of NSVT and was transferred to [facility] for further evaluation  Patient was started on a Amio Bolus and ggt  Patient is still undergoing Chemotherapy at [facility] for Multiple Myeloma and next treatment is scheduled for next week  Note: Patient states that prior to onset of his symptoms he had received news that his daughter in-law got in to a MVA and this was already after his eldest daughter got sick  States he is a very emotional person at baseline and this stress made him very anxious  Review of Systems  ROS as noted above  Historical Information   Past Medical History:   Diagnosis Date    Bone cancer ([address] )     Hiatal hernia with GERD     History of chemotherapy     Pt goes for treatments monthly and has been on chemo tx for 5 years   Hypertension     Iron deficiency anemia     Multiple myeloma (HCC)     Neutropenia (HCC)     Systolic murmur      No past surgical history on file    Social History     Substance and Sexual Activity   Alcohol Use Not Currently     Social History     Substance and Sexual Activity   Drug Use Never     Social History     Tobacco Use   Smoking Status Never Smoker   Smokeless Tobacco Never Used     Family History: No family history on file     Meds/Allergies   Hospital Medications:   Current Facility-Administered Medications   Medication Dose Route Frequency    acetaminophen (TYLENOL) tablet 650 mg  650 mg Oral Q6H PRN    acyclovir (ZOVIRAX) capsule 400 mg  400 mg Oral BID    amiodarone (CORDARONE) 900 mg in dextrose 5 % 500 mL infusion  0 5 mg/min Intravenous Continuous    aspirin chewable tablet 81 mg  81 mg Oral Daily    enoxaparin (LOVENOX) subcutaneous injection 40 mg  40 mg Subcutaneous Daily    metoprolol tartrate (LOPRESSOR) tablet 50 mg  50 mg Oral BID    morphine (MS CONTIN) ER tablet 15 mg  15 mg Oral BID    ondansetron (ZOFRAN) injection 4 mg  4 mg Intravenous Q6H PRN    oxyCODONE (ROXICODONE) IR tablet 5 mg  5 mg Oral Q4H PRN    pantoprazole (PROTONIX) EC tablet 40 mg  40 mg Oral Early Morning    Pomalidomide CAPS 3 mg  3 mg Oral Daily    prochlorperazine (COMPAZINE) tablet 10 mg  10 mg Oral Q6H PRN    senna-docusate sodium (SENOKOT S) 8 6-50 mg per tablet 1 tablet  1 tablet Oral Daily     Home Medications:   Medications Prior to Admission   Medication    acyclovir (ZOVIRAX) 400 MG tablet    aspirin 81 mg chewable tablet    Calcium 500-100 MG-UNIT CHEW    dexamethasone (DECADRON) 4 mg tablet    metoprolol tartrate (LOPRESSOR) 25 mg tablet    morphine (MS CONTIN) 15 mg 12 hr tablet    omeprazole (PriLOSEC) 40 MG capsule    oxyCODONE-acetaminophen (PERCOCET) 5-325 mg per tablet    Pomalidomide (POMALYST) 3 MG CAPS    prochlorperazine (COMPAZINE) 10 mg tablet    senna-docusate sodium (SENOKOT-S) 8 6-50 mg per tablet       No Known Allergies    Objective   Vitals: Blood pressure 147/90, pulse 62, temperature 97 8 °F (36 6 °C), temperature source Oral, resp  rate 18, weight 73 4 kg (161 lb 13 1 oz), SpO2 96 %    Orthostatic Blood Pressures      Most Recent Value   Blood Pressure  147/90 filed at 02/16/2021 0900            Invasive Devices     Peripheral Intravenous Line            Peripheral IV 02/15/21 Left Wrist less than 1 day    Peripheral IV 02/15/21 Right Antecubital less than 1 day                Physical Exam  GEN: Al Friday appears well, alert and oriented x 3, pleasant and cooperative   HEENT:  Normocephalic, atraumatic, anicteric, moist mucous membranes  NECK: No JVD or carotid bruits   HEART: reg rhythm, reg rate, normal S1 and S2, no murmurs, clicks, gallops or rubs   LUNGS: Clear to auscultation bilaterally; no wheezes, rales, or rhonchi; respiration nonlabored   ABDOMEN:  Normoactive bowel sounds, soft, no tenderness, no distention  EXTREMITIES: peripheral pulses palpable; no edema  NEURO: no gross focal findings; cranial nerves grossly intact   SKIN:  Dry, intact, warm to touch    Lab Results:   CBC with diff:   Results from last 7 days   Lab Units 02/16/21  0443   WBC Thousand/uL 6 03   RBC Million/uL 3 20*   HEMOGLOBIN g/dL 9 2*   HEMATOCRIT % 29 8*   MCV fL 93   MCH pg 28 8   MCHC g/dL 30 9*   RDW % 16 0*   MPV fL 11 3   PLATELETS Thousands/uL 218     CMP:   Results from last 7 days   Lab Units 02/16/21 0443 02/16/21  0026   SODIUM mmol/L 140 142   POTASSIUM mmol/L 3 7 4 0   CHLORIDE mmol/L 108 111*   CO2 mmol/L 28 27   BUN mg/dL 27* 30*   CREATININE mg/dL 1 05 1 20   CALCIUM mg/dL 8 6 9 0   AST U/L  --  13   ALT U/L  --  17   ALK PHOS U/L  --  60   EGFR ml/min/1 73sq m 67 57     Troponin:   0   Lab Value Date/Time    TROPONINI <0 02 02/16/2021 0026    TROPONINI <0 01 02/15/2021 2003    TROPONINI 0 10 (H) 05/14/2019 1123     BNP:   Results from last 7 days   Lab Units 02/16/21  0443   POTASSIUM mmol/L 3 7   CHLORIDE mmol/L 108   CO2 mmol/L 28   BUN mg/dL 27*   CREATININE mg/dL 1 05   CALCIUM mg/dL 8 6   EGFR ml/min/1 73sq m 67     Coags:   Results from last 7 days   Lab Units 02/15/21  2004   PTT seconds 30   INR  0 98     TSH:   Results from last 7 days   Lab Units 02/16/21  0026   TSH 3RD GENERATON uIU/mL 1 190     Magnesium:   Results from last 7 days   Lab Units 02/16/21  0443   MAGNESIUM mg/dL 2 5 Lipid Profile:       Lab Results   Component Value Date    TROPONINI <0 02 02/16/2021    TROPONINI <0 01 02/15/2021    TROPONINI 0 10 (H) 05/14/2019       Lab Results   Component Value Date    CALCIUM 8 6 02/16/2021    K 3 7 02/16/2021    CO2 28 02/16/2021     02/16/2021    BUN 27 (H) 02/16/2021    CREATININE 1 05 02/16/2021       Lab Results   Component Value Date    WBC 6 03 02/16/2021    HGB 9 2 (L) 02/16/2021    HCT 29 8 (L) 02/16/2021    MCV 93 02/16/2021     02/16/2021     Results from last 7 days   Lab Units 02/15/21  2004   INR  0 98       No results found for: CHOL  No results found for: HDL  No results found for: LDLCALC  No results found for: TRIG    Lab Results   Component Value Date    ALT 17 02/16/2021    AST 13 02/16/2021       Imaging: I have personally reviewed pertinent reports  Holter/Telemetry:   Telemetry strips not available as patient is downgraded  I reviewed this while they were available, patient had episodes of Afib with rates in 110s over night, however last few strips revealed NSR presumably in setting of Amiodarone  ECHO: No results found for this or any previous visit  NM STRESS TEST: (06/2019)  Evaluation of CAD  Overall normal study  Non-diagnostic ECG for ischemia    EKG:   Date: 2/15/2021  Interpretation: Sinus Tachycardia with runs of Atrial Tachycardia  ? Ectopic P-Waves  LVH on [place] Criteria  PVCs  VTE Prophylaxis: Heparin       Assessment/Plan     Assessment:    1  Atrial Fib//Flutter   2  HTN  3  LINDSEY  4  Multiple Myeloma on Chemotherapy (Dx  2014)  5  History of NSVT in 2019 that was attributed to Chemotherapy     Plan:  - f/u TTE  - Continue on Telemetry  - Discontinue Amiodarone ggt  - c/w Aspirin 81mg PO Daily  - c/w Metoprolol Tart  But increase from 50mg to 75mg PO BID    Case discussed and reviewed with Dr Jatinder Johns who agrees with my assessment and plan  Thank you for involving us in the care of your patient        Malgorzata Barrios MD  Cardiology Fellow PGY-4    ==========================================================================================    Counseling / Coordination of Care  Total floor / unit time spent today 45 minutes minutes  Greater than 50% of total time was spent with the patient and / or family counseling and / or coordination of care  A description of the counseling / coordination of care:         Epic/ Allscripts/Care Everywhere records reviewed:     ** Please Note: Fluency DirectDictation voice to text software may have been used in the creation of this document   **

## 2021-02-16 NOTE — ASSESSMENT & PLAN NOTE
Patient has reported history of hypertension, is only on beta-blockers as an outpatient  Pressures this admission have largely been stable    Plan  · Continue beta-blocker  · Monitor pressures accordingly  · Titrate regimen as appropriate

## 2021-02-16 NOTE — ASSESSMENT & PLAN NOTE
Patient has history of GERD secondary to hiatal hernia  Utilizes omeprazole at home    Plan  · Omeprazole not on formulary, will continue with Protonix while admitted

## 2021-02-16 NOTE — DISCHARGE SUMMARY
INTERNAL MEDICINE RESIDENCY DISCHARGE SUMMARY     Tung Figueroa   80 y o  male  MRN: [id number]  Room/Bed: [facility] 426/[facility] 426-01     [address] [place] [address] MED SURG/SD   Encounter: [id number]    Principal Problem:    Paroxysmal atrial fibrillation [facility])  Active Problems:    Essential hypertension    Hiatal hernia with GERD    Multiple myeloma ([address] )    Frequent PVCs      Frequent PVCs  Assessment & Plan  Chart review does show a history of frequent PVCs and PACs  On telemetry monitoring while in the CCU, patient has demonstrated largely normal sinus rhythm with frequent PVCs and PACs  Plan  · Cardiology consultation as mentioned in paroxysmal AFib section  Multiple myeloma [facility])  Assessment & Plan  Patient has history of multiple myeloma diagnosed in 2014  He follows closely with [facility]  He was treated with Velcade and dexamethasone until February 2016, where after he had progression of his disease and was placed on Revlimid until 2018  Was started back on Velcade and dexamethasone in March 2018, pomalidomide was added in May of 2018  Received 8 cycles of Daratumumab, bortzomib, and dexamethasone May to December 2019  Reportedly on Daratumumab and dexamethasone for maintenance since cycle 9, [facility] January 2020 to present  Plan  · Will continue pomalidomide while inpatient  · Consider following up with primary oncologist regarding current cycle of treatment vs consulting in-house oncology depending on how long patient remains admitted  · Patient also taking morphine for pain control  Hiatal hernia with GERD  Assessment & Plan  Patient has history of GERD secondary to hiatal hernia  Utilizes omeprazole at home  Plan  · Omeprazole not on formulary, will continue with Protonix while admitted and switch back to omeprazole on discharge          Essential hypertension  Assessment & Plan  Patient has reported history of hypertension, is only on beta-blockers as an outpatient  Pressures this admission have largely been stable  Plan  · Continue beta-blocker  · Monitor pressures accordingly  · Titrate regimen as appropriate        * Paroxysmal atrial fibrillation [facility])  Assessment & Plan  Patient was transferred to cardiac care unit overnight from [facility] given concerns for ventricular tachycardia, though documentation only shows AFib with RVR  Patient was bolused with amiodarone and placed on continuous infusion  As of this morning, patient has maintained normal sinus rhythm with occasional runs of rate controlled atrial fibrillation  Patient's previous echocardiogram listed in chart from 2019 showed ejection fraction of 55% with grade 1 diastolic dysfunction  Patient also had admission to [facility] in 2019 where he experienced nonsustained V-tach which was thought to be related to his chemotherapy treatment and illness at time  After this admission, patient was continued on metoprolol  Plan  · Repeat echocardiogram- showed EF 55% with grade 1 diastolic dysfunction , mild-to-moderate mitral regurg, mild aortic stenosis  · Cardiology consult- patient likely has AFib/a flutter with aberrancy  Plans to start patient on Eliquis 5 mg b i d  Today  Okay to discharge  · Increased Lopressor 50 mg b i d  to 75 mg b i d  Elizalla Annie · Calculated CHADS2 Vasc score of 3-4  · Plan to follow-up with PCP at [facility] and cardiologist outpatient  Opioid dependence:  in the setting of chronic pain, as evidenced by scheduled home dose of  MS Contin, requiring continuation of scheduled MS Contin while admitted  [address] COURSE     This is 80 year male with past medical history of multiple myeloma with recurrence on maintenance therapy, hypertension, nonsustained VT with frequent PACs and PVCs, GERD, hiatal hernia initially presented to [facility] ER and was transferred to [facility] for nonsustained SVT        Per discussion with the patient, patient initially presented to [facility] ER with 3 days of palpitations, chest pain, dizziness and shortness of breath  Patient admits that his symptoms started after he painted his house  As per the previous notes, patient was found to be in AFib with RVR on arrival in the ER  Patient received bolus of amiodarone and was placed on continuous amiodarone infusion  After that patient was converted to normal sinus rhythm with occasional runs of AFib with controlled ventricular response  Patient was also maintained on his multiple myeloma medications while inpatient  During my evaluation of the patient, patient was resting well without any acute distress  We communicated with Cardiology and decision was made to increase the dose of metoprolol to 75 mg b i d  And start patient on Eliquis 5 mg b i d  For AFib  Patient also had cardiac echo on the day of discharge which showed EF 55% with grade 1 diastolic dysfunction, mild-to-moderate mitral regurgitation and mild aortic stenosis  Patient had stable vitals on the day of discharge  Patient was asked to follow-up with cardiologist outpatient within 2 weeks  Discharge instructions were added to his discharge AVS       Physical Exam  Constitutional:       General: He is not in acute distress  Appearance: He is well-developed  He is not diaphoretic  HENT:      Head: Normocephalic and atraumatic  Nose: Nose normal       Mouth/Throat:      Pharynx: No oropharyngeal exudate  Eyes:      General: No scleral icterus  Right eye: No discharge  Left eye: No discharge  Neck:      Musculoskeletal: Normal range of motion and neck supple  Cardiovascular:      Rate and Rhythm: Normal rate and regular rhythm  Heart sounds: Normal heart sounds  No murmur  No friction rub  No gallop  Pulmonary:      Effort: Pulmonary effort is normal  No respiratory distress  Breath sounds: No stridor  No wheezing or rales  Abdominal:      General: Bowel sounds are normal  There is no distension  Palpations: Abdomen is soft  Tenderness: There is no abdominal tenderness  There is no guarding  Musculoskeletal: Normal range of motion  Skin:     General: Skin is warm  Findings: No erythema  Neurological:      Mental Status: He is alert and oriented to person, place, and time  DISCHARGE INFORMATION     PCP at Discharge: PCP Dr Carmen Hsu Provider: Kaley Hebert MD  Admission Date: 2/15/2021    Discharge Provider: John Paul Hernández MD  Discharge Date: 2/16/2021    Discharge Disposition: [address]  Discharge Condition: good  Discharge with Lines: no    Discharge Diet: cardiac diet  Activity Restrictions: none  Test Results Pending at Discharge: NONE    Discharge Diagnoses:  Principal Problem:    Paroxysmal atrial fibrillation ([facility] )  Active Problems:    Essential hypertension    Hiatal hernia with GERD    Multiple myeloma ([facility] )    Frequent PVCs  Resolved Problems:    * No resolved hospital problems  *      Consulting Providers:  Cardiology    Diagnostic & Therapeutic Procedures Performed:  Xr Chest Portable    Result Date: 2/15/2021  Impression: No acute cardiopulmonary disease  Workstation performed: [id number]       Code Status: Level 1 - Full Code  Advance Directive & Living Will: <no information>  Power of :    POLST:      Medications:  Current Discharge Medication List        Current Discharge Medication List      START taking these medications    Details   !! apixaban (ELIQUIS) 5 mg Take 1 tablet (5 mg total) by mouth 2 (two) times a day  Qty: 60 tablet, Refills: 0    Comments: This is for price check  Pt is pending placement  Associated Diagnoses: Atrial fibrillation with RVR ([facility] )      ! ! apixaban (ELIQUIS) 5 mg Take 1 tablet (5 mg total) by mouth 2 (two) times a day  Qty: 60 tablet, Refills: 0    Comments: This is for price check    Associated Diagnoses: Atrial fibrillation with RVR ([facility] )       ! ! - Potential duplicate medications found  Please discuss with provider  Current Discharge Medication List      CONTINUE these medications which have NOT CHANGED    Details   acyclovir (ZOVIRAX) 400 MG tablet Take 400 mg by mouth 2 (two) times a day      aspirin 81 mg chewable tablet Chew 81 mg daily      Calcium 500-100 MG-UNIT CHEW Chew 1 tablet daily      dexamethasone (DECADRON) 4 mg tablet Take 20 mg(5 tablets) po on Days 1, 2, 4, 5, 8, 9, 11, 12 on Cycles 1-8       metoprolol tartrate (LOPRESSOR) 25 mg tablet Take 50 mg by mouth 2 (two) times a day      morphine (MS CONTIN) 15 mg 12 hr tablet Take by mouth 2 (two) times a day      omeprazole (PriLOSEC) 40 MG capsule Take 40 mg by mouth daily      oxyCODONE-acetaminophen (PERCOCET) 5-325 mg per tablet Take 1 tablet by mouth every 4 (four) hours as needed for moderate pain      Pomalidomide (POMALYST) 3 MG CAPS Take 3 mg by mouth daily      prochlorperazine (COMPAZINE) 10 mg tablet Take 10 mg by mouth every 6 (six) hours as needed      senna-docusate sodium (SENOKOT-S) 8 6-50 mg per tablet Take 1 tablet by mouth daily             Allergies:  No Known Allergies    FOLLOW-UP     Name Relationship Specialty Phone Fax Address Order              [facility]  Cardiology [phone] [facility] [address]     Next Steps: Follow up in 2 week(s)      Zain Walters    [phone]  [address] [place] [address]     Next Steps: Follow up in 2 week(s)                   Discharge Statement:   I spent 1 hour minutes discharging the patient  This time was spent on the day of discharge  I had direct contact with the patient on the day of discharge  Additional documentation is required if more than 30 minutes were spent on discharge  Portions of the record may have been created with voice recognition software    Occasional wrong word or "sound a like" substitutions may have occurred due to the inherent limitations of voice recognition software    Read the chart carefully and recognize, using context, where substitutions have occurred     ==  Liam Gonzalez, [facility]  Internal Medicine Resident PGY-2

## 2021-02-16 NOTE — PLAN OF CARE
Problem: PAIN - ADULT  Goal: Verbalizes/displays adequate comfort level or baseline comfort level  Description: Interventions:  - Encourage patient to monitor pain and request assistance  - Assess pain using appropriate pain scale  - Administer analgesics based on type and severity of pain and evaluate response  - Implement non-pharmacological measures as appropriate and evaluate response  - Consider cultural and social influences on pain and pain management  - Notify physician/advanced practitioner if interventions unsuccessful or patient reports new pain  2/16/2021 1546 by Kelvin Parra RN  Outcome: Adequate for Discharge  2/16/2021 1546 by Kelvin Parra RN  Outcome: Adequate for Discharge  2/16/2021 1452 by Kelvin Parra RN  Outcome: Adequate for Discharge     Problem: INFECTION - ADULT  Goal: Absence or prevention of progression during hospitalization  Description: INTERVENTIONS:  - Assess and monitor for signs and symptoms of infection  - Monitor lab/diagnostic results  - Monitor all insertion sites, i e  indwelling lines, tubes, and drains  - Monitor endotracheal if appropriate and nasal secretions for changes in amount and color  - [place] appropriate cooling/warming therapies per order  - Administer medications as ordered  - Instruct and encourage patient and family to use good hand hygiene technique  - Identify and instruct in appropriate isolation precautions for identified infection/condition  2/16/2021 1546 by Kelvin Parra RN  Outcome: Adequate for Discharge  2/16/2021 1546 by Kelvin Parra RN  Outcome: Adequate for Discharge  2/16/2021 1452 by Kelvin Parra RN  Outcome: Adequate for Discharge  Goal: Absence of fever/infection during neutropenic period  Description: INTERVENTIONS:  - Monitor WBC    2/16/2021 1546 by Kelvin Parra RN  Outcome: Adequate for Discharge  2/16/2021 1546 by Kelvin Parra RN  Outcome: Adequate for Discharge  2/16/2021 1452 by Kelvin Parra RN  Outcome: Adequate for Discharge     Problem: SAFETY ADULT  Goal: Patient will remain free of falls  Description: INTERVENTIONS:  - Assess patient frequently for physical needs  -  Identify cognitive and physical deficits and behaviors that affect risk of falls    -  [place] fall precautions as indicated by assessment   - Educate patient/family on patient safety including physical limitations  - Instruct patient to call for assistance with activity based on assessment  - Modify environment to reduce risk of injury  - Consider OT/PT consult to assist with strengthening/mobility  2/16/2021 1546 by Ashlee Ramirez RN  Outcome: Adequate for Discharge  2/16/2021 1546 by Ashlee Ramirez RN  Outcome: Adequate for Discharge  2/16/2021 1452 by Ashlee Ramirez RN  Outcome: Adequate for Discharge  Goal: Maintain or return to baseline ADL function  Description: INTERVENTIONS:  -  Assess patient's ability to carry out ADLs; assess patient's baseline for ADL function and identify physical deficits which impact ability to perform ADLs (bathing, care of mouth/teeth, toileting, grooming, dressing, etc )  - Assess/evaluate cause of self-care deficits   - Assess range of motion  - Assess patient's mobility; develop plan if impaired  - Assess patient's need for assistive devices and provide as appropriate  - Encourage maximum independence but intervene and supervise when necessary  - Involve family in performance of ADLs  - Assess for home care needs following discharge   - Consider OT consult to assist with ADL evaluation and planning for discharge  - Provide patient education as appropriate  2/16/2021 1546 by Ashlee Ramirez RN  Outcome: Adequate for Discharge  2/16/2021 1546 by Ashlee Ramirez RN  Outcome: Adequate for Discharge  2/16/2021 1452 by Ashlee Ramirez RN  Outcome: Adequate for Discharge  Goal: Maintain or return mobility status to optimal level  Description: INTERVENTIONS:  - Assess patient's baseline mobility status (ambulation, transfers, stairs, etc )    - Identify cognitive and physical deficits and behaviors that affect mobility  - Identify mobility aids required to assist with transfers and/or ambulation (gait belt, sit-to-stand, lift, walker, cane, etc )  - [place] fall precautions as indicated by assessment  - Record patient progress and toleration of activity level on Mobility SBAR; progress patient to next Phase/Stage  - Instruct patient to call for assistance with activity based on assessment  - Consider rehabilitation consult to assist with strengthening/weightbearing, etc   2/16/2021 1546 by Maximilian Scales RN  Outcome: Adequate for Discharge  2/16/2021 1546 by Maximilian Scales RN  Outcome: Adequate for Discharge  2/16/2021 1452 by Maximilian Scales RN  Outcome: Adequate for Discharge     Problem: DISCHARGE PLANNING  Goal: Discharge to home or other facility with appropriate resources  Description: INTERVENTIONS:  - Identify barriers to discharge w/patient and caregiver  - Arrange for needed discharge resources and transportation as appropriate  - Identify discharge learning needs (meds, wound care, etc )  - Arrange for interpretive services to assist at discharge as needed  - Refer to Case Management Department for coordinating discharge planning if the patient needs post-hospital services based on physician/advanced practitioner order or complex needs related to functional status, cognitive ability, or social support system  2/16/2021 1546 by Maximilian Scales RN  Outcome: Adequate for Discharge  2/16/2021 1546 by Maximilian Scales RN  Outcome: Adequate for Discharge  2/16/2021 1452 by Maximilian Scales RN  Outcome: Adequate for Discharge     Problem: Knowledge Deficit  Goal: Patient/family/caregiver demonstrates understanding of disease process, treatment plan, medications, and discharge instructions  Description: Complete learning assessment and assess knowledge base    Interventions:  - Provide teaching at level of understanding  - Provide teaching via preferred learning methods  2/16/2021 1546 by Darrell Harvey RN  Outcome: Adequate for Discharge  2/16/2021 1546 by Darrell Harvey RN  Outcome: Adequate for Discharge  2/16/2021 1452 by Darrell Harvey RN  Outcome: Adequate for Discharge     Problem: NEUROSENSORY - ADULT  Goal: Achieves stable or improved neurological status  Description: INTERVENTIONS  - Monitor and report changes in neurological status  - Monitor vital signs such as temperature, blood pressure, glucose, and any other labs ordered   - Initiate measures to prevent increased intracranial pressure  - Monitor for seizure activity and implement precautions if appropriate      2/16/2021 1546 by Darrell Harvey RN  Outcome: Adequate for Discharge  2/16/2021 1546 by Darrell Harvey RN  Outcome: Adequate for Discharge  2/16/2021 1452 by Darrell Harvey RN  Outcome: Adequate for Discharge  Goal: Remains free of injury related to seizures activity  Description: INTERVENTIONS  - Maintain airway, patient safety  and administer oxygen as ordered  - Monitor patient for seizure activity, document and report duration and description of seizure to physician/advanced practitioner  - If seizure occurs,  ensure patient safety during seizure  - Reorient patient post seizure  - Seizure pads on all 4 side rails  - Instruct patient/family to notify RN of any seizure activity including if an aura is experienced  - Instruct patient/family to call for assistance with activity based on nursing assessment  - Administer anti-seizure medications if ordered    2/16/2021 1546 by Darrell Harvey RN  Outcome: Adequate for Discharge  2/16/2021 1546 by Darrell Harvey RN  Outcome: Adequate for Discharge  2/16/2021 1452 by Darrell Harvey RN  Outcome: Adequate for Discharge  Goal: Achieves maximal functionality and self care  Description: INTERVENTIONS  - Monitor swallowing and airway patency with patient fatigue and changes in neurological status  - Encourage and assist patient to increase activity and self care     - Encourage visually impaired, hearing impaired and aphasic patients to use assistive/communication devices  2/16/2021 1546 by Herson Sheikh RN  Outcome: Adequate for Discharge  2/16/2021 1546 by Herson Sheikh RN  Outcome: Adequate for Discharge  2/16/2021 1452 by Herson Sheikh RN  Outcome: Adequate for Discharge     Problem: CARDIOVASCULAR - ADULT  Goal: Maintains optimal cardiac output and hemodynamic stability  Description: INTERVENTIONS:  - Monitor I/O, vital signs and rhythm  - Monitor for S/S and trends of decreased cardiac output  - Administer and titrate ordered vasoactive medications to optimize hemodynamic stability  - Assess quality of pulses, skin color and temperature  - Assess for signs of decreased coronary artery perfusion  - Instruct patient to report change in severity of symptoms  2/16/2021 1546 by Herson Sheikh RN  Outcome: Adequate for Discharge  2/16/2021 1546 by Herson Sheikh RN  Outcome: Adequate for Discharge  2/16/2021 1452 by Herson Sheikh RN  Outcome: Adequate for Discharge  Goal: Absence of cardiac dysrhythmias or at baseline rhythm  Description: INTERVENTIONS:  - Continuous cardiac monitoring, vital signs, obtain 12 lead EKG if ordered  - Administer antiarrhythmic and heart rate control medications as ordered  - Monitor electrolytes and administer replacement therapy as ordered  2/16/2021 1546 by Herson Sheikh RN  Outcome: Adequate for Discharge  2/16/2021 1546 by Herson Sheikh RN  Outcome: Adequate for Discharge  2/16/2021 1452 by Herson Sheikh RN  Outcome: Adequate for Discharge     Problem: RESPIRATORY - ADULT  Goal: Achieves optimal ventilation and oxygenation  Description: INTERVENTIONS:  - Assess for changes in respiratory status  - Assess for changes in mentation and behavior  - Position to facilitate oxygenation and minimize respiratory effort  - Oxygen administered by appropriate delivery if ordered  - Initiate smoking cessation education as indicated  - Encourage broncho-pulmonary hygiene including cough, deep breathe, Incentive Spirometry  - Assess the need for suctioning and aspirate as needed  - Assess and instruct to report SOB or any respiratory difficulty  - Respiratory Therapy support as indicated  2/16/2021 1546 by Fay Benavidez RN  Outcome: Adequate for Discharge  2/16/2021 1546 by Fay Benavidez RN  Outcome: Adequate for Discharge  2/16/2021 1452 by Fay Benavidez RN  Outcome: Adequate for Discharge     Problem: GASTROINTESTINAL - ADULT  Goal: Minimal or absence of nausea and/or vomiting  Description: INTERVENTIONS:  - Administer IV fluids if ordered to ensure adequate hydration  - Maintain NPO status until nausea and vomiting are resolved  - Nasogastric tube if ordered  - Administer ordered antiemetic medications as needed  - Provide nonpharmacologic comfort measures as appropriate  - Advance diet as tolerated, if ordered  - Consider nutrition services referral to assist patient with adequate nutrition and appropriate food choices  2/16/2021 1546 by Fay Benavidez RN  Outcome: Adequate for Discharge  2/16/2021 1546 by Fay Benavidez RN  Outcome: Adequate for Discharge  2/16/2021 1452 by Fay Benavidez RN  Outcome: Adequate for Discharge  Goal: Maintains or returns to baseline bowel function  Description: INTERVENTIONS:  - Assess bowel function  - Encourage oral fluids to ensure adequate hydration  - Administer IV fluids if ordered to ensure adequate hydration  - Administer ordered medications as needed  - Encourage mobilization and activity  - Consider nutritional services referral to assist patient with adequate nutrition and appropriate food choices  2/16/2021 1546 by Fay Benavidez RN  Outcome: Adequate for Discharge  2/16/2021 1546 by Fay Benavidez RN  Outcome: Adequate for Discharge  2/16/2021 1452 by Fay Benavidez RN  Outcome: Adequate for Discharge  Goal: Maintains adequate nutritional intake  Description: INTERVENTIONS:  - Monitor percentage of each meal consumed  - Identify factors contributing to decreased intake, treat as appropriate  - Assist with meals as needed  - Monitor I&O, weight, and lab values if indicated  - Obtain nutrition services referral as needed  2/16/2021 1546 by Ant De Paz RN  Outcome: Adequate for Discharge  2/16/2021 1546 by Ant De Paz RN  Outcome: Adequate for Discharge  2/16/2021 1452 by Ant De Paz RN  Outcome: Adequate for Discharge     Problem: GENITOURINARY - ADULT  Goal: Maintains or returns to baseline urinary function  Description: INTERVENTIONS:  - Assess urinary function  - Encourage oral fluids to ensure adequate hydration if ordered  - Administer IV fluids as ordered to ensure adequate hydration  - Administer ordered medications as needed  - Offer frequent toileting  - Follow urinary retention protocol if ordered  2/16/2021 1546 by Ant De Paz RN  Outcome: Adequate for Discharge  2/16/2021 1546 by Ant De Paz RN  Outcome: Adequate for Discharge  2/16/2021 1452 by Ant De Paz RN  Outcome: Adequate for Discharge  Goal: Absence of urinary retention  Description: INTERVENTIONS:  - Assess patients ability to void and empty bladder  - Monitor I/O  - Bladder scan as needed  - Discuss with physician/AP medications to alleviate retention as needed  - Discuss catheterization for long term situations as appropriate  2/16/2021 1546 by Ant De Paz RN  Outcome: Adequate for Discharge  2/16/2021 1546 by Ant De Paz RN  Outcome: Adequate for Discharge  2/16/2021 1452 by Ant De Paz RN  Outcome: Adequate for Discharge     Problem: METABOLIC, FLUID AND ELECTROLYTES - ADULT  Goal: Electrolytes maintained within normal limits  Description: INTERVENTIONS:  - Monitor labs and assess patient for signs and symptoms of electrolyte imbalances  - Administer electrolyte replacement as ordered  - Monitor response to electrolyte replacements, including repeat lab results as appropriate  - Instruct patient on fluid and nutrition as appropriate  2/16/2021 1546 by Almas Aguayo RN  Outcome: Adequate for Discharge  2/16/2021 1546 by Almas Aguayo RN  Outcome: Adequate for Discharge  2/16/2021 1452 by Almas Aguayo RN  Outcome: Adequate for Discharge  Goal: Fluid balance maintained  Description: INTERVENTIONS:  - Monitor labs   - Monitor I/O and WT  - Instruct patient on fluid and nutrition as appropriate  - Assess for signs & symptoms of volume excess or deficit  2/16/2021 1546 by Almas Aguayo RN  Outcome: Adequate for Discharge  2/16/2021 1546 by Almas Aguayo RN  Outcome: Adequate for Discharge  2/16/2021 1452 by Almas Aguayo RN  Outcome: Adequate for Discharge  Goal: Glucose maintained within target range  Description: INTERVENTIONS:  - Monitor Blood Glucose as ordered  - Assess for signs and symptoms of hyperglycemia and hypoglycemia  - Administer ordered medications to maintain glucose within target range  - Assess nutritional intake and initiate nutrition service referral as needed  2/16/2021 1546 by Almas Aguayo RN  Outcome: Adequate for Discharge  2/16/2021 1546 by Almas Aguayo RN  Outcome: Adequate for Discharge  2/16/2021 1452 by Almas Aguayo RN  Outcome: Adequate for Discharge     Problem: SKIN/TISSUE INTEGRITY - ADULT  Goal: Skin integrity remains intact  Description: INTERVENTIONS  - Identify patients at risk for skin breakdown  - Assess and monitor skin integrity  - Assess and monitor nutrition and hydration status  - Monitor labs (i e  albumin)  - Assess for incontinence   - Turn and reposition patient  - Assist with mobility/ambulation  - Relieve pressure over bony prominences  - Avoid friction and shearing  - Provide appropriate hygiene as needed including keeping skin clean and dry  - Evaluate need for skin moisturizer/barrier cream  - Collaborate with interdisciplinary team (i e  Nutrition, Rehabilitation, etc )   - Patient/family teaching  2/16/2021 1546 by Lurdes Chong RN  Outcome: Adequate for Discharge  2/16/2021 1546 by Lurdes Chong RN  Outcome: Adequate for Discharge  2/16/2021 1452 by Lurdes Chong RN  Outcome: Adequate for Discharge  Goal: Incision(s), wounds(s) or drain site(s) healing without S/S of infection  Description: INTERVENTIONS  - Assess and document risk factors for skin impairment   - Assess and document dressing, incision, wound bed, drain sites and surrounding tissue  - Consider nutrition services referral as needed  - Oral mucous membranes remain intact  - Provide patient/ family education  2/16/2021 1546 by Lurdes Chong RN  Outcome: Adequate for Discharge  2/16/2021 1546 by Lurdes Chnog RN  Outcome: Adequate for Discharge  2/16/2021 1452 by Lurdes Chong RN  Outcome: Adequate for Discharge  Goal: Oral mucous membranes remain intact  Description: INTERVENTIONS  - Assess oral mucosa and hygiene practices  - Implement preventative oral hygiene regimen  - Implement oral medicated treatments as ordered  - Initiate Nutrition services referral as needed  2/16/2021 1546 by Lurdes Chong RN  Outcome: Adequate for Discharge  2/16/2021 1546 by Lurdes Chong RN  Outcome: Adequate for Discharge  2/16/2021 1452 by Lurdes Chong RN  Outcome: Adequate for Discharge     Problem: HEMATOLOGIC - ADULT  Goal: Maintains hematologic stability  Description: INTERVENTIONS  - Assess for signs and symptoms of bleeding or hemorrhage  - Monitor labs  - Administer supportive blood products/factors as ordered and appropriate  2/16/2021 1546 by Lurdes Chong RN  Outcome: Adequate for Discharge  2/16/2021 1546 by Lurdes Chong RN  Outcome: Adequate for Discharge  2/16/2021 1452 by Lurdes Chong RN  Outcome: Adequate for Discharge     Problem: MUSCULOSKELETAL - ADULT  Goal: Maintain or return mobility to safest level of function  Description: INTERVENTIONS:  - Assess patient's ability to carry out ADLs; assess patient's baseline for ADL function and identify physical deficits which impact ability to perform ADLs (bathing, care of mouth/teeth, toileting, grooming, dressing, etc )  - Assess/evaluate cause of self-care deficits   - Assess range of motion  - Assess patient's mobility  - Assess patient's need for assistive devices and provide as appropriate  - Encourage maximum independence but intervene and supervise when necessary  - Involve family in performance of ADLs  - Assess for home care needs following discharge   - Consider OT consult to assist with ADL evaluation and planning for discharge  - Provide patient education as appropriate  2/16/2021 1546 by Baldomero Mccabe RN  Outcome: Adequate for Discharge  2/16/2021 1546 by Baldmoero Mccabe RN  Outcome: Adequate for Discharge  2/16/2021 1452 by Baldomero Mccabe RN  Outcome: Adequate for Discharge  Goal: Maintain proper alignment of affected body part  Description: INTERVENTIONS:  - Support, maintain and protect limb and body alignment  - Provide patient/ family with appropriate education  2/16/2021 1546 by Baldomero Mccabe RN  Outcome: Adequate for Discharge  2/16/2021 1546 by Baldomero Mccabe RN  Outcome: Adequate for Discharge  2/16/2021 1452 by Baldomero Mccabe RN  Outcome: Adequate for Discharge     Problem: Potential for Falls  Goal: Patient will remain free of falls  Description: INTERVENTIONS:  - Assess patient frequently for physical needs  -  Identify cognitive and physical deficits and behaviors that affect risk of falls    -  [place] fall precautions as indicated by assessment   - Educate patient/family on patient safety including physical limitations  - Instruct patient to call for assistance with activity based on assessment  - Modify environment to reduce risk of injury  - Consider OT/PT consult to assist with strengthening/mobility  2/16/2021 1546 by Baldomero Mccabe RN  Outcome: Adequate for Discharge  2/16/2021 1546 by Ladan Chen RN  Outcome: Adequate for Discharge  2/16/2021 1452 by Ladan Chen RN  Outcome: Adequate for Discharge

## 2021-02-16 NOTE — ASSESSMENT & PLAN NOTE
Patient is seen by [facility] for multiple myeloma  Multiple myeloma was diagnosed in 2014, he had Velcade and dexamethasone until 02/2016 where he had progression of disease he was on Revlimid until 2018 03/2018 Velcade and dexamethasone was started and pomalidomide added in 5/2018  Daratumumab, bortezomib, Dex x 8 cycles 5/1-12/20/2019  Daratumumab and dex on main and since cycle 9, 01/03/2020 to current

## 2021-02-16 NOTE — DISCHARGE INSTRUCTIONS
Fibrilación auricular (Fib A)   LO QUE NECESITA SABER:   La Fib A podría ser intermitente o podría ser alex condición de [place]  La Fib A puede causar coágulos de michaela, accidente cerebrovascular o insuficiencia cardíaca  Estas condiciones pueden llegar a ser letales  Es importante tratar y controlar la Fib A para ayudar a evitar un coágulo de michaela, un accidente cerebrovascular o la insuficiencia cardíaca  INSTRUCCIONES SOBRE EL RODRI HOSPITALARIA:   Llame al número de emergencias local (911 en los [place]) si:  · Tiene alguno de los siguientes signos de un ataque cardíaco:      ? Estrujamiento, presión o tensión en mittal pecho    ? Usted también podría presentar alguno de los siguientes:     ? Malestar o dolor en mittal espalda, ghulam, mandíbula, abdomen, o George Fess    ? Falta de aliento    ? Náuseas o vómitos    ? Desvanecimiento o sudor frío repentino    · Usted tiene alguno de los siguientes signos de derrame cerebral:      ? Adormecimiento o caída de un lado de mittal martha    ? Debilidad en un George Fess o alex pierna    ? Confusión o debilidad para hablar    ? Mareos o dolor de obey intenso, o pérdida de la visión  Llame a mittal cardiólogo si:  · Mittal brazo o pierna se siente caliente, sensible y adolorida  Se podría sandeep inflamado y smith  · Mittal corazón late a más de 110 latidos por minuto  · Usted tiene nueva inflamación en ashley piernas, pies, tobillos o abdomen o esta ha empeorado  · Le falta el aire, incluso cuando está en reposo  · Usted tiene preguntas o inquietudes acerca de mittal condición o cuidado  Medicamentos: Es posible que usted necesite alguno de los siguientes:  · Los medicamentos para el corazón ayudan a controlar la frecuencia y el ritmo cardíaco  Es posible que necesite más de un medicamento para tratar ashley síntomas  · Los anticoagulantes ayudan a evitar los coágulos sanguíneos  Los coágulos pueden ocasionar derrames cerebrales, ataques al corazón y [facility] siguientes son pautas generales de seguridad para seguir mientras está tomando un anticoagulante:    ? Esté atento a sangrados y hematomas mientras esté tomando anticoagulantes  Esté atento a cualquier sangrado de las encías o nariz  Esté atento a la aparición de michaela en mittal orina y evacuaciones intestinales  Use alex toalla suave para mittal piel y un cepillo de dientes de cerdas suaves para cepillarse ashley dientes  [place] puede evitar que mittal piel o encías sangren  Si usted se afeita, use alex rasuradora eléctrica  No practique deportes de contacto  ? Informe a mittal odontólogo y otros médicos que usted esmer anticoagulantes  Lleve un brazalete o un collar que indique que usted esmer [employer]  ? No empiece ni suspenda ningún medicamento, a menos que mittal médico se lo indique  Muchos medicamentos no se pueden usar junto con los anticoagulantes  ? [place] el anticoagulante exactamente marisela se lo ordenó mittal médico  No omita ninguna dosis ni tome menos de lo indicado  Informe a mittal médico inmediatamente si usted olvida dao el anticoagulante o si esmer de más  ? La warfarina  es un anticoagulante que usted puede necesitar dao  Usted debería saber lo siguiente en heather de que tome warfarina:     § Algunos alimentos y medicamentos pueden afectar la cantidad de warfarina en mittal michaela  No realice cambios mayores en mittal alimentación mientras esmer warfarina  La warfarina funciona mejor si usted ingiere aproximadamente la misma cantidad de vitamina K todos los días  La vitamina K se encuentra en las hortalizas de hoja armando y algunos otros alimentos  Solicite más información acerca de lo que tiene que comer cuando usted esmer warfarina  § Usted necesitará acudir a consultas de control con mittal médico cuando esté tomando warfarina  Deberá hacerse análisis de michaela periódicamente  Estos análisis se usan para determinar la cantidad de [employer] necesita      · Los medicamentos antiplaquetarios , marisela la [facility], [facility] a prevenir coágulos de michaela  Halbur ashley antiplaquetarios exactamente marisela se le haya indicado  Estos medicamentos podrían causar mas probabilidad para sangrado y para desarrollar moretones  Si le rowley indicado el uso de aspirina, no tome acetaminofén o ibuprofeno en mittal lugar  · Halbur ashley medicamentos marisela se le haya indicado  Consulte con mittal médico si usted sandy que mittal medicamento no le está ayudando o si presenta efectos secundarios  Infórmele si es alérgico a cualquier medicamento  Mantenga alex lista actualizada de los [place], las vitaminas y los productos herbales que esmer  Incluya los siguientes datos de los medicamentos: cantidad, frecuencia y motivo de administración  Traiga con usted la lista o los envases de las píldoras a ashley citas de seguimiento  Lleve la lista de los medicamentos con usted en heather de alex emergencia  Controle la fibrilación auricular:  · Conozca mittal ritmo cardíaco ideal  Aprenda cómo controlarse el pulso y monitorear mittal ritmo cardíaco     · Conozca los riesgos si decide beber alcohol  El alcohol puede empeorar el control de la fibrilación auricular  Pregunte a mittal médico si usted puede dao alcohol  Un trago equivale a 12 onzas de cerveza, 5 onzas de vino o 1 onza y ½ de licor  · No fume  La nicotina puede causar daño cardíaco y dificultar el control de la fibrilación auricular  No use cigarrillos electrónicos o tabaco sin humo en vez de cigarrillos o para tratar de dejar de fumar  Todos estos aún contienen nicotina  Pida a mittal médico información si usted fuma actualmente y [place] para dejar de hacerlo  · Consuma alimentos saludables para el corazón  Los alimentos saludables para el corazón lo ayudarán a mantener mittal colesterol bajo  Las frutas, verduras, panes integrales, productos lácteos bajos en grasa, frijoles, donavan magras y pescado son Lavona Brenna saludables para el corazón   Reemplace la mantequilla y la margarina con aceites saludables para el corazón marisela el aceite de [place] y el aceite de [place]  · Mantenga un peso saludable  Consulte con mittal médico cuánto debería pesar  [place] que lo ayude a crear un plan seguro para bajar de peso si tiene sobrepeso  Incluso un pequeño objetivo de alex pérdida de peso del 10% puede mejorar mittal cole cardíaca  · Realice actividad física regularmente  La actividad física ayuda a mejorar la cole del corazón  Braden al menos 150 minutos de [place] física Anup moderada cada [place]  Mittal médico puede ayudarle a crear un plan de actividades  · Controle otras afecciones de [place]  [place] incluye la presión arterial anabella o el colesterol, la apnea del sueño, la diabetes y otras enfermedades cardíacas  [place] los [employer] se le haya indicado y siga mittal plan de [place]  Programe alex earline con mittal cardiólogo según le indiquen: Usted deberá hacerse análisis de michaela y someterse a observaciones periódicamente  Anote ashley preguntas para que se acuerde de hacerlas evie ashley visitas  © Copyright [employer] Information is for End User's use only and may not be sold, redistributed or otherwise used for commercial purposes  All illustrations and images included in CareNotes® are the copyrighted property of [employer]  or [address] [place] [address] es sólo para uso en educación  Mittal intención no es darle un consejo médico sobre enfermedades o tratamientos  Colsulte con mittal Robert Hu farmacéutico antes de seguir cualquier régimen médico para saber si es seguro y efectivo para usted  Apixabán (Por la boca)   Apixaban (a-PIX-a-ban)  Trata y previene la formación de coágulos de [place]  Vandana medicamento es un anticoagulante  Liv(s) : Eliquis, Eliquis 30 Day DVT/PE Starter Pack   Existen muchas otras marcas de [place]  Vandana medicamento no debe ser usado cuando:   Vandana medicamento no es adecuado para todas las personas   No lo use si alguna vez ha tenido alex reacción alérgica a apixaban o si tiene [place]  Forma de usar david medicamento:   Ramona Fat  · Mittal médico le indicara cuanto medicamento necesita usar  No use más medicamento de lo indicado  · Si no puede tragar las tabletas enteras, puede triturarlas y mezclarlas con agua, dextrosa 5% en agua (D5W), jugo de [place] o puré de [place]  Las tabletas machacadas se pueden mezclar con 60 mL de agua o D5W dosis y administradas través de alex sonda nasogástrica (sonda Nasogástrica)  · [facility] debe venir con [employer] Guía del medicamento  Solicite alex copia con mittal farmacéutico en heather de no tener la guía  · Si olvida alex dosis: [place] la dosis tan pronto marisela lo recuerde  Si es jimmie la hora para mittal próxima dosis, espere hasta entonces para dao mittal dosis regular  No tome medicamento adicional para reponer la dosis que omitió  · Guarde el medicamento en un recipiente cerrado a temperatura ambiente y alejado del calor, la humedad y la klever directa  Medicamentos y [employer] que debe evitar:   Consulte con mittal médico o farmacéutico antes de usar cualquier medicamento, incluyendo los que compra sin receta médica, las vitaminas y los productos herbales  · Algunos medicamentos pueden afectar la forma que funciona la apixaban  Informe a mittal médico si está usando cualquiera de los siguientes:   ? Carbamazepina, itraconazol, ketoconazol, fenitoína, rifampicina, ritonavir o hierba de [address]  ? Un anticoagulante (incluyendo clopidogrel, prasugrel, ticagrelor, warfarina)  ? Medicamento para tratar la depresión  ? Medicamentos analgésicos antiinflamatorios no esteroides, o [place], para el dolor o la artritis (incluye aspirina, celecoxib, diclofenac, ibuprofeno, naproxeno)  Precauciones evie el uso de david medicamento:   · Informe a mittal médico si está embarazada o amamantando, o si tiene enfermedad renal, enfermedad hepática, problemas de sangrado, síndrome antifosfolipídico o alex válvula artificial del corazón    · No suspenda el uso de [facility] de repente sin antes consultar con mittal médico  Usted podría correr un mayor riesgo de sufrir un derrame cerebral por un corto tiempo después de suspender el uso de vandana medicamento  · Vandana medicamento aumenta mittal riesgo de sangrado que puede convertirse en algo jamaica si no es controlado  Usted también podría hacerse moretones con facilidad, y es posible que el sangrado tome más tiempo de lo usual para detenerse  · Vandana medicamento puede incrementar el riesgo de formación de un hematoma en mittal columna o espalda si se somete a alex punción epidural o willis  [place] puede llevar a la parálisis  Informe a mittal médico si usted alguna vez ha tenido [facility] columna o cirugía de la espalda  · Dígale a todo médico o dentista encargado de atenderle que usted está usando [email or web address]  Con la supervisión de [facility], es posible que tenga que suspender el uso de vandana medicamento varios días antes de someterse a alex cirugía o exámenes médicos  · El médico solicitará exámenes de laboratorio evie las citas de rutina para revisar los efectos de [email or web address]  Asista a todas ashley citas  · Guarde todos los medicamentos fuera del alcance de los niños  Nunca comparta ashley medicamentos con [employer]  Efectos secundarios que pueden presentarse evie el uso de vandana medicamento:   Consulte inmediatamente con el médico si nota cualquiera de estos efectos secundarios:  · Reacción alérgica: Comezón o ronchas, hinchazón del katia o las katty, hinchazón u hormigueo en la boca o garganta, opresión en el pecho, dificultad para respirar  · Un cambio en la frecuencia y cantidad de la orina, [place] loli o rosada  · Dolor de pecho, dificultad para respirar  · Expectora michaela, vomita michaela o un material que se parece al café molido  · Adormecimiento, hormigueo o debilidad de los músculos de ashley piernas o pies  · Evacuaciones intestinales alquitranadas, bauer o negras  · Pedralva, moretones o debilidad inusuales    Consulte con el médico si nota otros efectos secundarios que sandy son causados por david medicamento  Llame a mittal médico para consultarle Megan Klein puede notificar ashley efectos secundarios al FDA al [phone]  © [facility] [employer] Information is for End User's use only and may not be sold, redistributed or otherwise used for commercial purposes  Esta información es sólo para uso en educación  Mittal intención no es darle un consejo médico sobre enfermedades o tratamientos  Colsulte con mittal Joanell Gal farmacéutico antes de seguir cualquier régimen médico para saber si es seguro y efectivo para usted

## 2021-02-16 NOTE — ASSESSMENT & PLAN NOTE
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

## 2021-02-16 NOTE — EMTALA/ACUTE CARE TRANSFER
[facility] EMERGENCY DEPARTMENT  [address] [facility] [address]  [phone]  Dept: [facility] [place] TRANSFER CONSENT    NAME Maricruz Bryan                                         1940                              MRN [id number]    I have been informed of my rights regarding examination, treatment, and transfer   by Dr Ronen Garcia MD    Benefits: Specialized equipment and/or services available at the receiving facility (Include comment)________________________, Continuity of care    Risks: Potential for delay in receiving treatment, Loss of IV, Potential deterioration of medical condition      Consent for Transfer:  I acknowledge that my medical condition has been evaluated and explained to me by the emergency department physician or other qualified medical person and/or my attending physician, who has recommended that I be transferred to the service of  Accepting Physician: Dr Shona Hernández at [address] Name, [address] : [facility]  The above potential benefits of such transfer, the potential risks associated with such transfer, and the probable risks of not being transferred have been explained to me, and I fully understand them  The doctor has explained that, in my case, the benefits of transfer outweigh the risks  I agree to be transferred  I authorize the performance of emergency medical procedures and treatments upon me in both transit and upon arrival at the receiving facility  Additionally, I authorize the release of any and all medical records to the receiving facility and request they be transported with me, if possible  I understand that the safest mode of transportation during a medical emergency is an ambulance and that the Hospital advocates the use of this mode of transport   Risks of traveling to the receiving facility by car, including absence of medical control, life sustaining equipment, such as oxygen, and medical personnel has been explained to me and I fully understand them  (LEYDI CORRECT BOX BELOW)  [  ]  I consent to the stated transfer and to be transported by ambulance/helicopter  [  ]  I consent to the stated transfer, but refuse transportation by ambulance and accept full responsibility for my transportation by car  I understand the risks of non-ambulance transfers and I exonerate the Hospital and its staff from any deterioration in my condition that results from this refusal     X___________________________________________    DATE  02/15/21  TIME________  Signature of patient or legally responsible individual signing on patient behalf           RELATIONSHIP TO PATIENT_________________________          Provider Certification    NAME Marcelo Bansal                                         1940                              MRN [id number]    A medical screening exam was performed on the above named patient  Based on the examination:    Condition Necessitating Transfer The encounter diagnosis was Ventricular tachyarrhythmia ([address] )      Patient Condition: The patient has been stabilized such that within reasonable medical probability, no material deterioration of the patient condition or the condition of the unborn child(denise) is likely to result from the transfer    Reason for Transfer: Level of Care needed not available at this facility    Transfer Requirements: [address]   · Space available and qualified personnel available for treatment as acknowledged by    · Agreed to accept transfer and to provide appropriate medical treatment as acknowledged by       Dr Jax Martines  · Appropriate medical records of the examination and treatment of the patient are provided at the time of transfer   [address] _______  · Transfer will be performed by qualified personnel from    and appropriate transfer equipment as required, including the use of necessary and appropriate life support measures  Provider Certification: I have examined the patient and explained the following risks and benefits of being transferred/refusing transfer to the patient/family:         Based on these reasonable risks and benefits to the patient and/or the unborn child(denise), and based upon the information available at the time of the patients examination, I certify that the medical benefits reasonably to be expected from the provision of appropriate medical treatments at another medical facility outweigh the increasing risks, if any, to the individuals medical condition, and in the case of labor to the unborn child, from effecting the transfer      X____________________________________________ DATE 02/15/21        TIME_______      ORIGINAL - SEND TO MEDICAL RECORDS   COPY - SEND WITH PATIENT DURING TRANSFER

## 2021-02-16 NOTE — PROGRESS NOTES
INTERNAL MEDICINE RESIDENCY TRANSFER ACCEPTANCE NOTE     Name: Al Friday   Age & Sex: 80 y o  male   MRN: [id number]  Unit/Bed#: [facility] 417-01   Encounter: [id number]  Hospital Stay Days: 1    Accepting team: SOD Team A  Code Status: Level 1 - Full Code  Disposition: Patient requires Med/Surg with Telemetry    ASSESSMENT/PLAN     Principal Problem:    Paroxysmal atrial fibrillation ([facility] [address] )  Active Problems:    Essential hypertension    Hiatal hernia with GERD    Multiple myeloma ([facility] [address] )    Frequent PVCs      * Paroxysmal atrial fibrillation [facility])  Assessment & Plan  Patient was transferred to cardiac care unit overnight from [address] [place] [address] given concerns for ventricular tachycardia, though documentation only shows AFib with RVR  Patient was bolused with amiodarone and placed on continuous infusion  As of this morning, patient has maintained normal sinus rhythm with occasional runs of rate controlled atrial fibrillation  Patient's previous echocardiogram listed in chart from 2019 showed ejection fraction of 55% with grade 1 diastolic dysfunction  Patient also had admission to [facility] in 2019 where he experienced nonsustained V-tach which was thought to be related to his chemotherapy treatment and illness at time  After this admission, patient was continued on metoprolol  Plan  · Repeat echocardiogram pending  · Cardiology consult pending, recommendations greatly appreciated  · Continue Lopressor 50 mg b i d   · Continue amiodarone  · Currently not on anticoagulation, will defer to Cardiology at this time, though patient may require anticoagulation as his current [id number] is 3-4 (A1c pending)  · Telemetry monitoring    Frequent PVCs  Assessment & Plan  Chart review does show a history of frequent PVCs and PACs  On telemetry monitoring while in the CCU, patient has demonstrated largely normal sinus rhythm with frequent PVCs and PACs    Plan  · Cardiology consultation as mentioned above  · Continue amiodarone and metoprolol    Multiple myeloma [facility])  Assessment & Plan  Patient has history of multiple myeloma diagnosed in 2014  He follows closely with [facility]  He was treated with Velcade and dexamethasone until February 2016, where after he had progression of his disease and was placed on Revlimid until 2018  Was started back on Velcade and dexamethasone in March 2018, pomalidomide was added in May of 2018  Received 8 cycles of Daratumumab, bortzomib, and dexamethasone May to December 2019  Reportedly on Daratumumab and dexamethasone for maintenance since cycle 9, fomr January 2020 to present  Plan  · Will continue pomalidomide while inpatient  · Consider following up with primary oncologist regarding current cycle of treatment vs consulting in-house oncology depending on how long patient remains admitted     Hiatal hernia with GERD  Assessment & Plan  Patient has history of GERD secondary to hiatal hernia  Utilizes omeprazole at home  Plan  · Omeprazole not on formulary, will continue with Protonix while admitted    Essential hypertension  Assessment & Plan  Patient has reported history of hypertension, is only on beta-blockers as an outpatient  Pressures this admission have largely been stable    Plan  · Continue beta-blocker  · Monitor pressures accordingly  · Titrate regimen as appropriate      VTE Pharmacologic Prophylaxis: Enoxaparin (Lovenox)  VTE Mechanical Prophylaxis: sequential compression device    HOSPITAL COURSE     Per Francoise Jane, MICHAEL, H+P, "Vernell Gillespie is a 80 y o  male with a medical history of multiple myeloma with recurrence on maintenance therapy at this time seen by [facility], hypertension, history 2019 with nonsustained VT with frequent PACs and PVCs, GERD with hiatal hernia came to [facility] heart ER and was [facility] of nonsustained VT, patient was started on amiodarone drip and bolus, patient complains of a 2 day history of intermittent shortness of breath with dizziness  Denies any chest pain fever chills coughing nausea vomiting  Patient is on maintenance treatment for multiple myeloma "    Per discussion with patient, he presented to the [facility] ED complaining of 3 days of palpitations  In reviewing his chart and discussing with rounding critical care Carter GARCIA PA-C, patient was found to be in AFib with RVR on arrival   He received bolus of amiodarone and was placed on continuous infusion  Since then he has maintained normal sinus rhythm with occasional runs of AFib  His cardiology consult and 2D echocardiogram are pending  Per Critical Care, decision to resume patient's home dexamethasone has been tended until regimen can be sorted out with his primary oncologist     SUBJECTIVE     Patient was seen and examined at bedside while still in the CCU  He is primarily [place]-speaking but can converse reasonably well in [place]  He he tells me that this morning he feels significantly improved from presentation  He states he has no current complaints at time of my exam and is eagerly anticipating discharge home  Case was discussed with rounding RN, all questions and concerns were addressed at this time  OBJECTIVE     Vitals:    02/16/21 0530 02/16/21 0700 02/16/21 0800 02/16/21 0900   BP:  145/68 160/87 147/90   Pulse:  58 60 62   Resp:  17 21 18   Temp:   97 8 °F (36 6 °C)    TempSrc:   Oral    SpO2:  95% 98% 96%   Weight: 73 4 kg (161 lb 13 1 oz)        I/O last 24 hours: In: 210 1 [I V :210 1]  Out: 850 [Urine:850]    Physical Exam  Vitals signs and nursing note reviewed  Constitutional:       General: He is not in acute distress  Appearance: Normal appearance  He is obese  He is not ill-appearing, toxic-appearing or diaphoretic  Comments: Patient is an elderly gentleman resting comfortably on a hospital bed in no acute distress  He is generally pleasant, calm, and cooperative  HENT:      Head: Normocephalic and atraumatic  Eyes:      General: No scleral icterus  Extraocular Movements: Extraocular movements intact  Conjunctiva/sclera: Conjunctivae normal       Pupils: Pupils are equal, round, and reactive to light  Neck:      Musculoskeletal: Neck supple  Cardiovascular:      Rate and Rhythm: Normal rate and regular rhythm  Pulses: Normal pulses  Heart sounds: Normal heart sounds  No murmur  No friction rub  No gallop  Pulmonary:      Effort: Pulmonary effort is normal  No respiratory distress  Breath sounds: Normal breath sounds  No stridor  No wheezing or rhonchi  Abdominal:      General: Abdomen is flat  Bowel sounds are normal  There is no distension  Palpations: Abdomen is soft  There is no mass  Tenderness: There is no abdominal tenderness  There is no right CVA tenderness, left CVA tenderness, guarding or rebound  Hernia: No hernia is present  Musculoskeletal:         General: No swelling, tenderness, deformity or signs of injury  Right lower leg: No edema  Left lower leg: No edema  Lymphadenopathy:      Cervical: No cervical adenopathy  Skin:     General: Skin is warm and dry  Capillary Refill: Capillary refill takes less than 2 seconds  Coloration: Skin is not pale  Findings: No erythema or rash  Neurological:      General: No focal deficit present  Mental Status: He is alert  Psychiatric:         Mood and Affect: Mood normal          Behavior: Behavior normal          Thought Content: Thought content normal          Judgment: Judgment normal        LABORATORY DATA     Labs: I have personally reviewed pertinent reports      Results from last 7 days   Lab Units 02/16/21  0443 02/16/21  0026 02/15/21  2003   WBC Thousand/uL 6 03 6 69 6 70   HEMOGLOBIN g/dL 9 2* 9 5* 9 9*   HEMATOCRIT % 29 8* 30 5* 30 9*   PLATELETS Thousands/uL 218 223 256   NEUTROS PCT % 59 61  --    MONOS PCT % 22* 20*  -- MONO PCT %  --   --  15*      Results from last 7 days   Lab Units 02/16/21 0443 02/16/21  0026 02/15/21  2003   POTASSIUM mmol/L 3 7 4 0 4 4   CHLORIDE mmol/L 108 111* 105   CO2 mmol/L 28 27 29   BUN mg/dL 27* 30* 37*   CREATININE mg/dL 1 05 1 20 1 32   CALCIUM mg/dL 8 6 9 0 9 3   ALK PHOS U/L  --  60  --    ALT U/L  --  17  --    AST U/L  --  13  --      Results from last 7 days   Lab Units 02/16/21  0443 02/16/21  0026   MAGNESIUM mg/dL 2 5 2 6     Results from last 7 days   Lab Units 02/16/21 0443 02/16/21  0026   PHOSPHORUS mg/dL 3 1 3 4      Results from last 7 days   Lab Units 02/15/21  2004   INR  0 98   PTT seconds 30         Results from last 7 days   Lab Units 02/16/21  0026 02/15/21  2003   TROPONIN I ng/mL <0 02 <0 01     Micro:  No results found for: Sandra Backer, WOUNDCULT, SPUTUMCULTUR  IMAGING & DIAGNOSTIC TESTING     Imaging: I have personally reviewed pertinent reports  Xr Chest Portable    Result Date: 2/15/2021  Impression: No acute cardiopulmonary disease  Workstation performed: [id number]     EKG, Pathology, and Other Studies: I have personally reviewed pertinent reports       ALLERGIES   No Known Allergies  MEDICATIONS     Current Facility-Administered Medications   Medication Dose Route Frequency Provider Last Rate    acetaminophen  650 mg Oral Q6H PRN Erika Vergara PA-C      acyclovir  400 mg Oral BID Angel Hale PA-C      amiodarone  0 5 mg/min Intravenous Continuous Erika Vergara PA-C 0 5 mg/min (02/16/21 0738)    aspirin  81 mg Oral Daily Angel Hale PA-C      enoxaparin  40 mg Subcutaneous Daily Francoise Sanchez PA-C      metoprolol tartrate  50 mg Oral BID Angel Hale PA-C      morphine  15 mg Oral BID Angel Hale PA-C      ondansetron  4 mg Intravenous Q6H PRN Angel Hale PA-C      oxyCODONE  5 mg Oral Q4H PRN Erika Vergara PA-C      pantoprazole  40 mg Oral Early Morning Erika Vergara PA-C      Pomalidomide  3 mg Oral Daily Benson Salinas PA-C      prochlorperazine  10 mg Oral Q6H PRN Benson Salinas PA-C      senna-docusate sodium  1 tablet Oral Daily Martha Saab PA-C       amiodarone, 0 5 mg/min, Last Rate: 0 5 mg/min (02/16/21 0738)      acetaminophen, 650 mg, Q6H PRN  ondansetron, 4 mg, Q6H PRN  oxyCODONE, 5 mg, Q4H PRN  prochlorperazine, 10 mg, Q6H PRN        Portions of the record may have been created with voice recognition software  Occasional wrong word or "sound a like" substitutions may have occurred due to the inherent limitations of voice recognition software    Read the chart carefully and recognize, using context, where substitutions have occurred     ==  Nelly Salinas, [facility]  Internal Medicine Residency PGY-2

## 2021-02-16 NOTE — RESTORATIVE TECHNICIAN NOTE
Restorative Specialist Mobility Note       Activity: Ambulate in fierro, Chair     Assistive Device: None

## 2021-02-16 NOTE — ED NOTES
Pt placed on continual cardiac, BP, and SPO2 monitoring  Alarms are set and audible  This nurse to continue monitoring        Ivan Camacho RN  02/15/21 1952

## 2021-02-16 NOTE — H&P
H&P- Anahi Sexton 1940, 80 y o  male MRN: [id number]    Unit/Bed#: [facility] 417-01 Encounter: [id number]    Primary Care Provider: No primary care provider on file  Date and time admitted to hospital: 2/15/2021 11:26 PM        * Paroxysmal atrial fibrillation [facility])  Assessment & Plan  [facility] of [facility], going through his history appears to be new onset was started on amiodarone ggt  - last echocardiogram was in 2019 which shows a normal EF of 55% with grade 1 diastolic dysfunction and mild AI AS, patient had an admission at [facility] in 2019 were he was diagnosed with nonsustained V-tach most likely related to chemotherapy and illness at the time  A cardiology follow-up patient remained on metoprolol no other medications  He also appears to have history of frequent PACs and PVCs     - continue Lopressor 50 mg b i d   - cardiology consult  - repeat echocardiogram  - continue amiodarone for now, hold off on anticoagulation    Frequent PVCs  Assessment & Plan  Patient is in normal sinus with frequent PVCs and PACs  Continue amiodarone   Cardiology consult    Multiple myeloma [facility])  Assessment & Plan  Patient is seen by [facility] for multiple myeloma  Multiple myeloma was diagnosed in 2014, he had Velcade and dexamethasone until 02/2016 where he had progression of disease he was on Revlimid until 2018  03/2018 Velcade and dexamethasone was started and pomalidomide added in 5/2018  Daratumumab, bortezomib, Dex x 8 cycles 5/1-12/20/2019  Daratumumab and dex on main and since cycle 9, 01/03/2020 to current      Hiatal hernia with GERD  Assessment & Plan  Continue omeprazole      Essential hypertension  Assessment & Plan  Blood pressure is well controlled  Continue Lopressor      -------------------------------------------------------------------------------------------------------------  Chief Complaint:  Nonsustained tachyarrhythmia    History of Present Illness   HX and PE limited by:  Nothing  Rosa Vielka is a 80 y o  male with a medical history of multiple myeloma with recurrence on maintenance therapy at this time seen by [facility], hypertension, history 2019 with nonsustained VT with frequent PACs and PVCs, GERD with hiatal hernia came to [place] ER and was transferred to [place] for evaluation of nonsustained VT, patient was started on amiodarone drip and bolus, patient complains of a 2 day history of intermittent shortness of breath with dizziness  Denies any chest pain fever chills coughing nausea vomiting  Patient is on maintenance treatment for multiple myeloma  History obtained from the patient   -------------------------------------------------------------------------------------------------------------  Dispo: Admit to Stepdown Level 1    Code Status: Level 1 - Full Code  --------------------------------------------------------------------------------------------------------------  Review of Systems   Constitutional: Positive for fatigue  Negative for appetite change, diaphoresis and fever  HENT: Negative for congestion, facial swelling, rhinorrhea, sneezing and tinnitus  Eyes: Negative for photophobia, pain and discharge  Respiratory: Positive for shortness of breath  Negative for apnea and cough  Cardiovascular: Positive for leg swelling  Negative for chest pain  Gastrointestinal: Negative for abdominal distention, abdominal pain, diarrhea, nausea and vomiting  Endocrine: Negative for cold intolerance, polydipsia and polyphagia  Genitourinary: Negative for enuresis, frequency and hematuria  Musculoskeletal: Negative for arthralgias, gait problem and myalgias  Skin: Negative for color change and rash  Allergic/Immunologic: Negative for environmental allergies and food allergies  Neurological: Positive for dizziness   Negative for syncope, speech difficulty, light-headedness and headaches  Hematological: Negative for adenopathy  Does not bruise/bleed easily  Psychiatric/Behavioral: Negative for behavioral problems, decreased concentration and hallucinations  The patient is not hyperactive  A 12-point, complete review of systems was reviewed and negative except as stated above     Physical Exam  Vitals signs and nursing note reviewed  Constitutional:       General: He is not in acute distress  Appearance: He is well-developed and normal weight  He is not ill-appearing  HENT:      Head: Normocephalic and atraumatic  Eyes:      Pupils: Pupils are equal, round, and reactive to light  Neck:      Musculoskeletal: Normal range of motion and neck supple  Cardiovascular:      Rate and Rhythm: Normal rate  Rhythm irregular  Pulses: Normal pulses  Heart sounds: Normal heart sounds  No murmur  Pulmonary:      Effort: Pulmonary effort is normal       Breath sounds: Normal breath sounds  No stridor  No wheezing or rhonchi  Abdominal:      General: Bowel sounds are normal  There is no distension  Palpations: Abdomen is soft  Tenderness: There is no abdominal tenderness  There is no guarding  Genitourinary:     Penis: Normal     Musculoskeletal: Normal range of motion  Right lower leg: Edema present  Left lower leg: Edema present  Skin:     General: Skin is warm and dry  Capillary Refill: Capillary refill takes less than 2 seconds  Coloration: Skin is not pale  Neurological:      Mental Status: He is alert and oriented to person, place, and time  Psychiatric:         Behavior: Behavior normal        --------------------------------------------------------------------------------------------------------------  Vitals: There were no vitals filed for this visit  Temp  Min: 98 1 °F (36 7 °C)  Max: 98 1 °F (36 7 °C)        There is no height or weight on file to calculate BMI      Laboratory and Diagnostics:  Results from last 7 days   Lab Units 02/15/21  2003   WBC Thousand/uL 6 70   HEMOGLOBIN g/dL 9 9*   HEMATOCRIT % 30 9*   PLATELETS Thousands/uL 256   BANDS PCT % 1   MONO PCT % 15*     Results from last 7 days   Lab Units 02/15/21  2003   SODIUM mmol/L 140   POTASSIUM mmol/L 4 4   CHLORIDE mmol/L 105   CO2 mmol/L 29   ANION GAP mmol/L 6   BUN mg/dL 37*   CREATININE mg/dL 1 32   CALCIUM mg/dL 9 3   GLUCOSE RANDOM mg/dL 100*          Results from last 7 days   Lab Units 02/15/21  2004   INR  0 98   PTT seconds 30      Results from last 7 days   Lab Units 02/15/21  2003   TROPONIN I ng/mL <0 01         ABG:    VBG:          Micro:        EKG:  Normal sinus rhythm with frequent PACs and PVCs  Imaging: I have personally reviewed pertinent reports  Historical Information   Past Medical History:   Diagnosis Date    Bone cancer ([facility] [address] )     Hiatal hernia with GERD     History of chemotherapy     Pt goes for treatments monthly and has been on chemo tx for 5 years   Hypertension     Iron deficiency anemia     Multiple myeloma (HCC)     Neutropenia (HCC)     Systolic murmur      No past surgical history on file  Social History   Social History     Substance and Sexual Activity   Alcohol Use Not Currently     Social History     Substance and Sexual Activity   Drug Use Never     Social History     Tobacco Use   Smoking Status Never Smoker   Smokeless Tobacco Never Used     Exercise History: n/a  Family History:   No family history on file    I have reviewed this patient's family history and commented on sigificant items within the HPI      Medications:  Current Facility-Administered Medications   Medication Dose Route Frequency    acetaminophen (TYLENOL) tablet 650 mg  650 mg Oral Q6H PRN    amiodarone (CORDARONE) 900 mg in dextrose 5 % 500 mL infusion  1 mg/min Intravenous Continuous    Followed by   Familia Elmore amiodarone (CORDARONE) 900 mg in dextrose 5 % 500 mL infusion  0 5 mg/min Intravenous Continuous  enoxaparin (LOVENOX) subcutaneous injection 40 mg  40 mg Subcutaneous Daily    ondansetron (ZOFRAN) injection 4 mg  4 mg Intravenous Q6H PRN    oxyCODONE (ROXICODONE) IR tablet 5 mg  5 mg Oral Q4H PRN    pantoprazole (PROTONIX) EC tablet 40 mg  40 mg Oral Early Morning     Home medications:  Prior to Admission Medications   Prescriptions Last Dose Informant Patient Reported? Taking?    Calcium 500-100 MG-UNIT CHEW   Yes No   Sig: Chew 1 tablet daily   Pomalidomide (POMALYST) 3 MG CAPS   Yes No   Sig: Take 3 mg by mouth daily   acyclovir (ZOVIRAX) 400 MG tablet   Yes No   Sig: Take 400 mg by mouth 2 (two) times a day   aspirin 81 mg chewable tablet   Yes No   Sig: Chew 81 mg daily   dexamethasone (DECADRON) 4 mg tablet   Yes No   Sig: Take 20 mg(5 tablets) po on Days 1, 2, 4, 5, 8, 9, 11, 12 on Cycles 1-8    metoprolol tartrate (LOPRESSOR) 25 mg tablet   Yes No   Sig: Take 50 mg by mouth 2 (two) times a day   morphine (MS CONTIN) 15 mg 12 hr tablet   Yes No   Sig: Take by mouth 2 (two) times a day   omeprazole (PriLOSEC) 40 MG capsule   Yes No   Sig: Take 40 mg by mouth daily   oxyCODONE-acetaminophen (PERCOCET) 5-325 mg per tablet  Self Yes No   Sig: Take 1 tablet by mouth every 4 (four) hours as needed for moderate pain   prochlorperazine (COMPAZINE) 10 mg tablet   Yes No   Sig: Take 10 mg by mouth every 6 (six) hours as needed   senna-docusate sodium (SENOKOT-S) 8 6-50 mg per tablet  Self Yes No   Sig: Take 1 tablet by mouth daily      Facility-Administered Medications: None     Allergies:  No Known Allergies  ------------------------------------------------------------------------------------------------------------  Advance Directive and Living Will:      Power of :    POLST:    ------------------------------------------------------------------------------------------------------------  Care Time Delivered:   Upon my evaluation, this patient had a high probability of imminent or life-threatening deterioration due to due to tachyarrhythmia, which required my direct attention, intervention, and personal management  I have personally provided 30 minutes (0001 to 0030) of critical care time, exclusive of procedures, teaching, family meetings, and any prior time recorded by providers other than myself  Francoise Sanchez PA-C        Portions of the record may have been created with voice recognition software  Occasional wrong word or "sound a like" substitutions may have occurred due to the inherent limitations of voice recognition software    Read the chart carefully and recognize, using context, where substitutions have occurred

## 2021-02-16 NOTE — ED NOTES
Pharmacy called to ask if Amiodarone needs to be mixed  This nurse asked that medication be mixed as ordered  Pharmacy to send medication via tube system        Panda Herman RN  02/15/21 2018

## 2021-02-16 NOTE — ASSESSMENT & PLAN NOTE
Patient has history of multiple myeloma diagnosed in 2014  He follows closely with [facility]  He was treated with Velcade and dexamethasone until February 2016, where after he had progression of his disease and was placed on Revlimid until 2018  Was started back on Velcade and dexamethasone in March 2018, pomalidomide was added in May of 2018  Received 8 cycles of Daratumumab, bortzomib, and dexamethasone May to December 2019  Reportedly on Daratumumab and dexamethasone for maintenance since cycle 9, [facility] January 2020 to present    Plan  · Will continue pomalidomide while inpatient  · Consider following up with primary oncologist regarding current cycle of treatment vs consulting in-house oncology depending on how long patient remains admitted

## 2021-02-16 NOTE — PLAN OF CARE
Problem: PAIN - ADULT  Goal: Verbalizes/displays adequate comfort level or baseline comfort level  Description: Interventions:  - Encourage patient to monitor pain and request assistance  - Assess pain using appropriate pain scale  - Administer analgesics based on type and severity of pain and evaluate response  - Implement non-pharmacological measures as appropriate and evaluate response  - Consider cultural and social influences on pain and pain management  - Notify physician/advanced practitioner if interventions unsuccessful or patient reports new pain  Outcome: Progressing     Problem: INFECTION - ADULT  Goal: Absence or prevention of progression during hospitalization  Description: INTERVENTIONS:  - Assess and monitor for signs and symptoms of infection  - Monitor lab/diagnostic results  - Monitor all insertion sites, i e  indwelling lines, tubes, and drains  - Monitor endotracheal if appropriate and nasal secretions for changes in amount and color  - [place] appropriate cooling/warming therapies per order  - Administer medications as ordered  - Instruct and encourage patient and family to use good hand hygiene technique  - Identify and instruct in appropriate isolation precautions for identified infection/condition  Outcome: Progressing  Goal: Absence of fever/infection during neutropenic period  Description: INTERVENTIONS:  - Monitor WBC    Outcome: Progressing     Problem: SAFETY ADULT  Goal: Patient will remain free of falls  Description: INTERVENTIONS:  - Assess patient frequently for physical needs  -  Identify cognitive and physical deficits and behaviors that affect risk of falls    -  [place] fall precautions as indicated by assessment   - Educate patient/family on patient safety including physical limitations  - Instruct patient to call for assistance with activity based on assessment  - Modify environment to reduce risk of injury  - Consider OT/PT consult to assist with strengthening/mobility  Outcome: Progressing  Goal: Maintain or return to baseline ADL function  Description: INTERVENTIONS:  -  Assess patient's ability to carry out ADLs; assess patient's baseline for ADL function and identify physical deficits which impact ability to perform ADLs (bathing, care of mouth/teeth, toileting, grooming, dressing, etc )  - Assess/evaluate cause of self-care deficits   - Assess range of motion  - Assess patient's mobility; develop plan if impaired  - Assess patient's need for assistive devices and provide as appropriate  - Encourage maximum independence but intervene and supervise when necessary  - Involve family in performance of ADLs  - Assess for home care needs following discharge   - Consider OT consult to assist with ADL evaluation and planning for discharge  - Provide patient education as appropriate  Outcome: Progressing  Goal: Maintain or return mobility status to optimal level  Description: INTERVENTIONS:  - Assess patient's baseline mobility status (ambulation, transfers, stairs, etc )    - Identify cognitive and physical deficits and behaviors that affect mobility  - Identify mobility aids required to assist with transfers and/or ambulation (gait belt, sit-to-stand, lift, walker, cane, etc )  - [place] fall precautions as indicated by assessment  - Record patient progress and toleration of activity level on Mobility SBAR; progress patient to next Phase/Stage  - Instruct patient to call for assistance with activity based on assessment  - Consider rehabilitation consult to assist with strengthening/weightbearing, etc   Outcome: Progressing     Problem: DISCHARGE PLANNING  Goal: Discharge to home or other facility with appropriate resources  Description: INTERVENTIONS:  - Identify barriers to discharge w/patient and caregiver  - Arrange for needed discharge resources and transportation as appropriate  - Identify discharge learning needs (meds, wound care, etc )  - Arrange for interpretive services to assist at discharge as needed  - Refer to Case Management Department for coordinating discharge planning if the patient needs post-hospital services based on physician/advanced practitioner order or complex needs related to functional status, cognitive ability, or social support system  Outcome: Progressing     Problem: Knowledge Deficit  Goal: Patient/family/caregiver demonstrates understanding of disease process, treatment plan, medications, and discharge instructions  Description: Complete learning assessment and assess knowledge base    Interventions:  - Provide teaching at level of understanding  - Provide teaching via preferred learning methods  Outcome: Progressing     Problem: NEUROSENSORY - ADULT  Goal: Achieves stable or improved neurological status  Description: INTERVENTIONS  - Monitor and report changes in neurological status  - Monitor vital signs such as temperature, blood pressure, glucose, and any other labs ordered   - Initiate measures to prevent increased intracranial pressure  - Monitor for seizure activity and implement precautions if appropriate      Outcome: Progressing  Goal: Remains free of injury related to seizures activity  Description: INTERVENTIONS  - Maintain airway, patient safety  and administer oxygen as ordered  - Monitor patient for seizure activity, document and report duration and description of seizure to physician/advanced practitioner  - If seizure occurs,  ensure patient safety during seizure  - Reorient patient post seizure  - Seizure pads on all 4 side rails  - Instruct patient/family to notify RN of any seizure activity including if an aura is experienced  - Instruct patient/family to call for assistance with activity based on nursing assessment  - Administer anti-seizure medications if ordered    Outcome: Progressing  Goal: Achieves maximal functionality and self care  Description: INTERVENTIONS  - Monitor swallowing and airway patency with patient fatigue and changes in neurological status  - Encourage and assist patient to increase activity and self care     - Encourage visually impaired, hearing impaired and aphasic patients to use assistive/communication devices  Outcome: Progressing     Problem: CARDIOVASCULAR - ADULT  Goal: Maintains optimal cardiac output and hemodynamic stability  Description: INTERVENTIONS:  - Monitor I/O, vital signs and rhythm  - Monitor for S/S and trends of decreased cardiac output  - Administer and titrate ordered vasoactive medications to optimize hemodynamic stability  - Assess quality of pulses, skin color and temperature  - Assess for signs of decreased coronary artery perfusion  - Instruct patient to report change in severity of symptoms  Outcome: Progressing  Goal: Absence of cardiac dysrhythmias or at baseline rhythm  Description: INTERVENTIONS:  - Continuous cardiac monitoring, vital signs, obtain 12 lead EKG if ordered  - Administer antiarrhythmic and heart rate control medications as ordered  - Monitor electrolytes and administer replacement therapy as ordered  Outcome: Progressing     Problem: RESPIRATORY - ADULT  Goal: Achieves optimal ventilation and oxygenation  Description: INTERVENTIONS:  - Assess for changes in respiratory status  - Assess for changes in mentation and behavior  - Position to facilitate oxygenation and minimize respiratory effort  - Oxygen administered by appropriate delivery if ordered  - Initiate smoking cessation education as indicated  - Encourage broncho-pulmonary hygiene including cough, deep breathe, Incentive Spirometry  - Assess the need for suctioning and aspirate as needed  - Assess and instruct to report SOB or any respiratory difficulty  - Respiratory Therapy support as indicated  Outcome: Progressing     Problem: GASTROINTESTINAL - ADULT  Goal: Minimal or absence of nausea and/or vomiting  Description: INTERVENTIONS:  - Administer IV fluids if ordered to ensure adequate hydration  - Maintain NPO status until nausea and vomiting are resolved  - Nasogastric tube if ordered  - Administer ordered antiemetic medications as needed  - Provide nonpharmacologic comfort measures as appropriate  - Advance diet as tolerated, if ordered  - Consider nutrition services referral to assist patient with adequate nutrition and appropriate food choices  Outcome: Progressing  Goal: Maintains or returns to baseline bowel function  Description: INTERVENTIONS:  - Assess bowel function  - Encourage oral fluids to ensure adequate hydration  - Administer IV fluids if ordered to ensure adequate hydration  - Administer ordered medications as needed  - Encourage mobilization and activity  - Consider nutritional services referral to assist patient with adequate nutrition and appropriate food choices  Outcome: Progressing  Goal: Maintains adequate nutritional intake  Description: INTERVENTIONS:  - Monitor percentage of each meal consumed  - Identify factors contributing to decreased intake, treat as appropriate  - Assist with meals as needed  - Monitor I&O, weight, and lab values if indicated  - Obtain nutrition services referral as needed  Outcome: Progressing     Problem: GENITOURINARY - ADULT  Goal: Maintains or returns to baseline urinary function  Description: INTERVENTIONS:  - Assess urinary function  - Encourage oral fluids to ensure adequate hydration if ordered  - Administer IV fluids as ordered to ensure adequate hydration  - Administer ordered medications as needed  - Offer frequent toileting  - Follow urinary retention protocol if ordered  Outcome: Progressing  Goal: Absence of urinary retention  Description: INTERVENTIONS:  - Assess patients ability to void and empty bladder  - Monitor I/O  - Bladder scan as needed  - Discuss with physician/AP medications to alleviate retention as needed  - Discuss catheterization for long term situations as appropriate  Outcome: Progressing     Problem: METABOLIC, FLUID AND ELECTROLYTES - ADULT  Goal: Electrolytes maintained within normal limits  Description: INTERVENTIONS:  - Monitor labs and assess patient for signs and symptoms of electrolyte imbalances  - Administer electrolyte replacement as ordered  - Monitor response to electrolyte replacements, including repeat lab results as appropriate  - Instruct patient on fluid and nutrition as appropriate  Outcome: Progressing  Goal: Fluid balance maintained  Description: INTERVENTIONS:  - Monitor labs   - Monitor I/O and WT  - Instruct patient on fluid and nutrition as appropriate  - Assess for signs & symptoms of volume excess or deficit  Outcome: Progressing  Goal: Glucose maintained within target range  Description: INTERVENTIONS:  - Monitor Blood Glucose as ordered  - Assess for signs and symptoms of hyperglycemia and hypoglycemia  - Administer ordered medications to maintain glucose within target range  - Assess nutritional intake and initiate nutrition service referral as needed  Outcome: Progressing     Problem: SKIN/TISSUE INTEGRITY - ADULT  Goal: Skin integrity remains intact  Description: INTERVENTIONS  - Identify patients at risk for skin breakdown  - Assess and monitor skin integrity  - Assess and monitor nutrition and hydration status  - Monitor labs (i e  albumin)  - Assess for incontinence   - Turn and reposition patient  - Assist with mobility/ambulation  - Relieve pressure over bony prominences  - Avoid friction and shearing  - Provide appropriate hygiene as needed including keeping skin clean and dry  - Evaluate need for skin moisturizer/barrier cream  - Collaborate with interdisciplinary team (i e  Nutrition, Rehabilitation, etc )   - Patient/family teaching  Outcome: Progressing  Goal: Incision(s), wounds(s) or drain site(s) healing without S/S of infection  Description: INTERVENTIONS  - Assess and document risk factors for skin impairment   - Assess and document dressing, incision, wound bed, drain sites and surrounding tissue  - Consider nutrition services referral as needed  - Oral mucous membranes remain intact  - Provide patient/ family education  Outcome: Progressing  Goal: Oral mucous membranes remain intact  Description: INTERVENTIONS  - Assess oral mucosa and hygiene practices  - Implement preventative oral hygiene regimen  - Implement oral medicated treatments as ordered  - Initiate Nutrition services referral as needed  Outcome: Progressing     Problem: HEMATOLOGIC - ADULT  Goal: Maintains hematologic stability  Description: INTERVENTIONS  - Assess for signs and symptoms of bleeding or hemorrhage  - Monitor labs  - Administer supportive blood products/factors as ordered and appropriate  Outcome: Progressing     Problem: MUSCULOSKELETAL - ADULT  Goal: Maintain or return mobility to safest level of function  Description: INTERVENTIONS:  - Assess patient's ability to carry out ADLs; assess patient's baseline for ADL function and identify physical deficits which impact ability to perform ADLs (bathing, care of mouth/teeth, toileting, grooming, dressing, etc )  - Assess/evaluate cause of self-care deficits   - Assess range of motion  - Assess patient's mobility  - Assess patient's need for assistive devices and provide as appropriate  - Encourage maximum independence but intervene and supervise when necessary  - Involve family in performance of ADLs  - Assess for home care needs following discharge   - Consider OT consult to assist with ADL evaluation and planning for discharge  - Provide patient education as appropriate  Outcome: Progressing  Goal: Maintain proper alignment of affected body part  Description: INTERVENTIONS:  - Support, maintain and protect limb and body alignment  - Provide patient/ family with appropriate education  Outcome: Progressing

## 2021-02-16 NOTE — ED NOTES
This nurse requested and received filter for Amiodarone infusion  This nurse applied it to set  IVPB bolus infusing with no signs of complications at site        Sherice Sanchez RN  02/15/21 2039

## 2021-02-16 NOTE — ASSESSMENT & PLAN NOTE
Patient was transferred to cardiac care unit overnight from [facility] given concerns for ventricular tachycardia, though documentation only shows AFib with RVR  Patient was bolused with amiodarone and placed on continuous infusion  As of this morning, patient has maintained normal sinus rhythm with occasional runs of rate controlled atrial fibrillation  Patient's previous echocardiogram listed in chart from 2019 showed ejection fraction of 55% with grade 1 diastolic dysfunction  Patient also had admission to [facility] in 2019 where he experienced nonsustained V-tach which was thought to be related to his chemotherapy treatment and illness at time  After this admission, patient was continued on metoprolol    Plan  · Repeat echocardiogram pending  · Cardiology consult pending, recommendations greatly appreciated  · Continue Lopressor 50 mg b i d   · Continue amiodarone  · Currently not on anticoagulation, will defer to Cardiology at this time, though patient may require anticoagulation as his current [id number] is 3-4 (A1c pending)  · Telemetry monitoring

## 2021-02-16 NOTE — ED NOTES
Pt has had multiple runs of v-tach  See strip documentation  Pt remains on the cardiac monitor  Alarms are set and audible  Dr Amrik Rahman is aware   Pt is currently using urinal       Jeannine Guadarrama RN  02/15/21 2013

## 2021-02-16 NOTE — PROGRESS NOTES
All discharge instructions explained to patient and grandaughter  All questions answered  PIVs removed bleeding controlled  Eliquis carenotes reviewed with patient and grand daughter  All other medication information also reviewed  Pt belongings gathered  Pt escorted to [facility] pharmacy via wheelchair by PCA

## 2021-02-16 NOTE — ED PROCEDURE NOTE
PROCEDURE  ECG 12 Lead Documentation Only    Date/Time: 2/15/2021 8:06 PM  Performed by: Idania Brian MD  Authorized by: Idania Brian MD     Indications / Diagnosis:  Dizziness  ECG reviewed by me, the ED Provider: yes    Patient location:  ED  Interpretation:     Interpretation: abnormal    Rate:     ECG rate:  106    ECG rate assessment: tachycardic    Rhythm:     Rhythm: sinus tachycardia    Ectopy:     Ectopy: PVCs    QRS:     QRS axis:  Normal    QRS intervals:  Normal  Conduction:     Conduction: normal    ST segments:     ST segments:  Normal  T waves:     T waves: normal           Idania Brian MD  02/15/21 2006

## 2021-02-16 NOTE — ED PROVIDER NOTES
History  Chief Complaint   Patient presents with    Chest Pain     Pt brought to ER for evaluation of CP, dizziness, and dyspnea since yesterday  Patient is a 79-year-old male with a history of multiple myeloma and high blood pressure who presents with family with a 2 day history of intermittent shortness of breath and dizziness  Nothing since seems to trigger the dizziness but patient states that the shortness of breath came on tonight after he pain it the house  No headache no chest pain no nausea or vomiting no cough no fever  Patient still is undergoing chemotherapy which next treatment scheduled for the following week  States otherwise compliant with his medications  PCP and heme Onc are both affiliated with [facility]  Prior to Admission Medications   Prescriptions Last Dose Informant Patient Reported? Taking?    Calcium 500-100 MG-UNIT CHEW 2/15/2021 at Unknown time  Yes Yes   Sig: Chew 1 tablet daily   Pomalidomide (POMALYST) 3 MG CAPS   Yes No   Sig: Take 3 mg by mouth daily   acyclovir (ZOVIRAX) 400 MG tablet 2/15/2021 at Unknown time  Yes Yes   Sig: Take 400 mg by mouth 2 (two) times a day   aspirin 81 mg chewable tablet 2/15/2021 at Unknown time  Yes Yes   Sig: Chew 81 mg daily   dexamethasone (DECADRON) 4 mg tablet 2/15/2021 at Unknown time  Yes Yes   Sig: Take 20 mg(5 tablets) po on Days 1, 2, 4, 5, 8, 9, 11, 12 on Cycles 1-8    metoprolol tartrate (LOPRESSOR) 25 mg tablet 2/15/2021 at Unknown time  Yes Yes   Sig: Take 50 mg by mouth 2 (two) times a day   morphine (MS CONTIN) 15 mg 12 hr tablet 2/15/2021 at Unknown time  Yes Yes   Sig: Take by mouth 2 (two) times a day   omeprazole (PriLOSEC) 40 MG capsule 2/15/2021 at Unknown time  Yes Yes   Sig: Take 40 mg by mouth daily   oxyCODONE-acetaminophen (PERCOCET) 5-325 mg per tablet  Self Yes Yes   Sig: Take 1 tablet by mouth every 4 (four) hours as needed for moderate pain   prochlorperazine (COMPAZINE) 10 mg tablet   Yes No   Sig: Take 10 mg by mouth every 6 (six) hours as needed   senna-docusate sodium (SENOKOT-S) 8 6-50 mg per tablet  Self Yes Yes   Sig: Take 1 tablet by mouth daily      Facility-Administered Medications: None       Past Medical History:   Diagnosis Date    [facility] ([facility] [address] )     Hiatal hernia with GERD     History of chemotherapy     Pt goes for treatments monthly and has been on chemo tx for 5 years   Hypertension     Iron deficiency anemia     Multiple myeloma (HCC)     Neutropenia (HCC)     Systolic murmur        History reviewed  No pertinent surgical history  History reviewed  No pertinent family history  I have reviewed and agree with the history as documented  E-Cigarette/Vaping    E-Cigarette Use Never User      E-Cigarette/Vaping Substances    Nicotine No     THC No     CBD No     Flavoring No     Other No     Unknown No      Social History     Tobacco Use    Smoking status: Never Smoker    Smokeless tobacco: Never Used   Substance Use Topics    Alcohol use: Not Currently    Drug use: Never       Review of Systems   Constitutional: Negative  HENT: Negative  Eyes: Negative  Respiratory: Positive for shortness of breath  Negative for cough  Cardiovascular: Negative  Gastrointestinal: Negative  Endocrine: Negative  Genitourinary: Negative  Musculoskeletal: Negative  Skin: Negative  Allergic/Immunologic: Negative  Neurological: Positive for dizziness  Hematological: Negative  Psychiatric/Behavioral: Negative  All other systems reviewed and are negative  Physical Exam  Physical Exam  Vitals signs and nursing note reviewed  Constitutional:       Appearance: He is well-developed and normal weight  HENT:      Head: Normocephalic and atraumatic  Eyes:      Extraocular Movements: Extraocular movements intact  Pupils: Pupils are equal, round, and reactive to light        Comments: Pale mucous membranes   Neck:      Musculoskeletal: Normal range of motion and neck supple  Cardiovascular:      Rate and Rhythm: Normal rate and regular rhythm  Heart sounds: Normal heart sounds  Pulmonary:      Effort: Pulmonary effort is normal  No tachypnea, accessory muscle usage or respiratory distress  Breath sounds: Normal breath sounds  No stridor  No decreased breath sounds, wheezing, rhonchi or rales  Abdominal:      General: Bowel sounds are normal       Palpations: Abdomen is soft  Musculoskeletal: Normal range of motion  Right lower leg: He exhibits no tenderness  No edema  Left lower leg: He exhibits no tenderness  No edema  Skin:     General: Skin is warm and dry  Capillary Refill: Capillary refill takes less than 2 seconds  Neurological:      General: No focal deficit present  Mental Status: He is alert and oriented to person, place, and time           Vital Signs  ED Triage Vitals   Temperature Pulse Respirations Blood Pressure SpO2   02/15/21 2017 02/15/21 1951 02/15/21 1951 02/15/21 1951 02/15/21 1951   98 1 °F (36 7 °C) (!) 132 20 (!) 166/114 96 %      Temp Source Heart Rate Source Patient Position - Orthostatic VS BP Location FiO2 (%)   02/15/21 2017 02/15/21 1951 02/15/21 2015 02/15/21 1951 --   Tympanic Monitor Lying Left arm       Pain Score       --                  Vitals:    02/15/21 2015 02/15/21 2030 02/15/21 2045 02/15/21 2100   BP: 156/89 143/72 126/78 129/70   Pulse: (!) 139 (!) 132 (!) 134 (!) 113   Patient Position - Orthostatic VS: Lying Lying Lying Lying         Visual Acuity      ED Medications  Medications   magnesium sulfate 2 g/50 mL IVPB (premix) 2 g (2 g Intravenous New Bag 2/15/21 2112)   amiodarone (CORDARONE) 900 mg in dextrose 5 % 500 mL infusion (1 mg/min Intravenous New Bag 2/15/21 2105)   amiodarone 240 mg in dextrose 5 % 100 mL IV bolus (0 mg Intravenous Stopped 2/15/21 2053)       Diagnostic Studies  Results Reviewed     Procedure Component Value Units Date/Time    Troponin I [[id number]]  (Normal) Collected: 02/15/21 2003    Lab Status: Final result Specimen: Blood from Arm, Right Updated: 02/15/21 2038     Troponin I <0 01 ng/mL     Manual Differential (Non Wam) [[id number]]  (Abnormal) Collected: 02/15/21 2003    Lab Status: Final result Specimen: Blood from Arm, Right Updated: 02/15/21 2037     Segmented % 51 %      Bands % 1 %      Lymphocytes % 30 %      Monocytes % 15 %      Eosinophils, % 3 %      Neutrophils Absolute 3 48 Thousand/uL      Lymphocytes Absolute 2 01 Thousand/uL      Monocytes Absolute 1 01 Thousand/uL      Eosinophils Absolute 0 20 Thousand/uL      Total Counted 100     RBC Morphology Normal     Platelet Estimate Adequate    Basic metabolic panel [[id number]]  (Abnormal) Collected: 02/15/21 2003    Lab Status: Final result Specimen: Blood from Arm, Right Updated: 02/15/21 2029     Sodium 140 mmol/L      Potassium 4 4 mmol/L      Chloride 105 mmol/L      CO2 29 mmol/L      ANION GAP 6 mmol/L      BUN 37 mg/dL      Creatinine 1 32 mg/dL      Glucose 100 mg/dL      Calcium 9 3 mg/dL      eGFR 51 ml/min/1 73sq m     Narrative:      Hemolysis  National Kidney Disease Foundation guidelines for Chronic Kidney Disease (CKD):     Stage 1 with normal or high GFR (GFR > 90 mL/min/1 73 square meters)    Stage 2 Mild CKD (GFR = 60-89 mL/min/1 73 square meters)    Stage 3A Moderate CKD (GFR = 45-59 mL/min/1 73 square meters)    Stage 3B Moderate CKD (GFR = 30-44 mL/min/1 73 square meters)    Stage 4 Severe CKD (GFR = 15-29 mL/min/1 73 square meters)    Stage 5 End Stage CKD (GFR <15 mL/min/1 73 square meters)  Note: GFR calculation is accurate only with a steady state creatinine    NT-BNP PRO [[id number]]  (Abnormal) Collected: 02/15/21 2003    Lab Status: Final result Specimen: Blood from Arm, Right Updated: 02/15/21 2029     NT-proBNP 449 pg/mL     Protime-INR [[id number]]  (Normal) Collected: 02/15/21 2004    Lab Status: Final result Specimen: Blood from Arm, Right Updated: 02/15/21 2020     Protime 13 1 seconds      INR 0 98    APTT [[id number]]  (Normal) Collected: 02/15/21 2004    Lab Status: Final result Specimen: Blood from Arm, Right Updated: 02/15/21 2020     PTT 30 seconds     CBC and differential [[id number]]  (Abnormal) Collected: 02/15/21 2003    Lab Status: Final result Specimen: Blood from Arm, Right Updated: 02/15/21 2019     WBC 6 70 Thousand/uL      RBC 3 42 Million/uL      Hemoglobin 9 9 g/dL      Hematocrit 30 9 %      MCV 91 fL      MCH 29 0 pg      MCHC 32 1 g/dL      RDW 17 5 %      MPV 9 6 fL      Platelets 868 Thousands/uL                  XR chest portable   Final Result by Author DO Carla (02/15 2037)   No acute cardiopulmonary disease  Workstation performed: [id number]                    Procedures  Procedures         ED Course  ED Course as of Feb 15 2129   Mon Feb 15, 2021   2006 Patient has had 2 runs of V-tach while here on the monitor  2031 Spoke with patient and family  When over patient having multiple runs of V-tach need for amiodarone  Explained need to transfer to a higher level of care Family okay with transfer to [facility]  2040 Spoke with Dr Valentino Puentes, agrees with [facility], would like 2 grams of mag, and amio gtt  Will accept at [facility]  SBIRT 20yo+      Most Recent Value   SBIRT (22 yo +)   In order to provide better care to our patients, we are screening all of our patients for alcohol and drug use  Would it be okay to ask you these screening questions? Yes Filed at: 02/15/2021 1952   Initial Alcohol Screen: US AUDIT-C    1  How often do you have a drink containing alcohol?  0 Filed at: 02/15/2021 1952   2  How many drinks containing alcohol do you have on a typical day you are drinking? 0 Filed at: 02/15/2021 1952   3a  Male UNDER 65: How often do you have five or more drinks on one occasion? 0 Filed at: 02/15/2021 1952   3b  FEMALE Any Age, or MALE 65+:  How often do you have 4 or more drinks on one occassion? 0 Filed at: 02/15/2021 1952   Audit-C Score  0 Filed at: 02/15/2021 1952   JOSH: How many times in the past year have you    Used an illegal drug or used a prescription medication for non-medical reasons? Never Filed at: 02/15/2021 1952                    MDM  Number of Diagnoses or Management Options  Ventricular tachyarrhythmia [facility]):      Amount and/or Complexity of Data Reviewed  Clinical lab tests: ordered and reviewed  Tests in the radiology section of CPT®: ordered and reviewed  Tests in the medicine section of CPT®: reviewed and ordered  Obtain history from someone other than the patient: yes  Review and summarize past medical records: yes  Discuss the patient with other providers: yes  Independent visualization of images, tracings, or specimens: yes        Disposition  Final diagnoses:   Ventricular tachyarrhythmia ([address] )     Time reflects when diagnosis was documented in both MDM as applicable and the Disposition within this note     Time User Action Codes Description Comment    2/15/2021  8:42 PM Xi Rodríguez Add [I47 2] Ventricular tachyarrhythmia [facility])       ED Disposition     ED Disposition Condition Date/Time Comment    Transfer to Another [facility] Feb 15, 2021  8:42 PM Al Friday should be transferred out to [facility] Kit Henry MD Documentation      Most Recent Value   Patient Condition  The patient has been stabilized such that within reasonable medical probability, no material deterioration of the patient condition or the condition of the unborn child(denise) is likely to result from the transfer   Reason for Transfer  Level of Care needed not available at this facility   Benefits of Transfer  Specialized equipment and/or services available at the receiving facility (Include comment)________________________, Continuity of care   Risks of Transfer  Potential for delay in receiving treatment, Loss of IV, Potential deterioration of medical condition   Accepting Physician  Bria Loja MD, Dr      RN Documentation      Most [facility] Name, Bria Alfredo      Follow-up Information    None         Patient's Medications   Discharge Prescriptions    No medications on file     No discharge procedures on file      PDMP Review     None          ED Provider  Electronically Signed by           Erinn Park MD  02/15/21 5417

## 2021-02-19 ENCOUNTER — PATIENT OUTREACH (OUTPATIENT)
Dept: CASE MANAGEMENT | Facility: OTHER | Age: 81
End: 2021-02-19

## 2021-02-19 NOTE — PROGRESS NOTES
In basket notification of hospital discharge received  Chart reviewed  Using a  ID # [id number] I spoke with patient who is at home doing well  He denies chest pain or palpitations  He is taking his medication as directed  He has an appointment for follow up on 2/26  He has transportation to the office he states  He took my contact information and repeated it back to me  He did consent to another call

## 2021-03-02 ENCOUNTER — PATIENT OUTREACH (OUTPATIENT)
Dept: CASE MANAGEMENT | Facility: OTHER | Age: 81
End: 2021-03-02

## 2021-03-02 NOTE — PROGRESS NOTES
Using an  ID # [id number] I spoke with patient who is at home doing well  He denies chest pain, palpitations or shortness of breath  He had a ZIO placed 2/25 for 7 days and will be sending it back in 2 days  He will return to the cardiology office for follow up 3/25  There were no changes to his medications  He has no needs at this time  He continues to follow with Hem/ONC for infusions for multiple myeloma  He did consent to another call

## 2021-03-29 ENCOUNTER — PATIENT OUTREACH (OUTPATIENT)
Dept: CASE MANAGEMENT | Facility: OTHER | Age: 81
End: 2021-03-29

## 2021-03-29 NOTE — PROGRESS NOTES
I called patient using a  ID # [id number]  Patient reports he is doing well  Denies any palpitations, chest pain or shortness of breath  Saw cardiology 3/25 no change in medications were made  Per note patient was referred to electrophysiology due to periods of Afib on his monitor reading  He has an appointment  4/22  He has no needs at this time  He received his first covid 19 vaccine and is scheduled for the second one in April

## 2021-04-26 ENCOUNTER — PATIENT OUTREACH (OUTPATIENT)
Dept: CASE MANAGEMENT | Facility: OTHER | Age: 81
End: 2021-04-26

## 2021-04-26 NOTE — PROGRESS NOTES
I called patient with the assistance of a  ID# [id number]  He is doing well  He saw a cardiologist due to an abnormal ZIO report  Patient's Lopressor was increased to 100 mgm twice daily which he reports he is taking  He also scheduled a YULIA in 2 weeks  He denies chest pain,palpitations or shortness of breath  He has my contact information and I advised him to call if I can be of assistance

## 2021-05-05 ENCOUNTER — APPOINTMENT (EMERGENCY)
Dept: RADIOLOGY | Facility: HOSPITAL | Age: 81
End: 2021-05-05
Payer: MEDICARE

## 2021-05-05 ENCOUNTER — HOSPITAL ENCOUNTER (OUTPATIENT)
Facility: HOSPITAL | Age: 81
Setting detail: OBSERVATION
Discharge: HOME | End: 2021-05-05
Attending: EMERGENCY MEDICINE | Admitting: INTERNAL MEDICINE
Payer: MEDICARE

## 2021-05-05 VITALS
RESPIRATION RATE: 20 BRPM | TEMPERATURE: 97.3 F | OXYGEN SATURATION: 100 % | WEIGHT: 181.1 LBS | HEART RATE: 70 BPM | SYSTOLIC BLOOD PRESSURE: 137 MMHG | DIASTOLIC BLOOD PRESSURE: 93 MMHG | BODY MASS INDEX: 33.12 KG/M2

## 2021-05-05 DIAGNOSIS — R68.83 CHILLS: ICD-10-CM

## 2021-05-05 DIAGNOSIS — R50.9 FEVER: Primary | ICD-10-CM

## 2021-05-05 DIAGNOSIS — C90.00 MULTIPLE MYELOMA (HCC): ICD-10-CM

## 2021-05-05 LAB
ALBUMIN SERPL BCP-MCNC: 3.6 G/DL (ref 3–5.2)
ALP SERPL-CCNC: 55 U/L (ref 43–122)
ALT SERPL W P-5'-P-CCNC: 11 U/L
ANION GAP SERPL CALCULATED.3IONS-SCNC: 5 MMOL/L (ref 5–14)
AST SERPL W P-5'-P-CCNC: 19 U/L (ref 17–59)
BACTERIA UR QL AUTO: ABNORMAL /HPF
BASOPHILS # BLD AUTO: 0 THOUSANDS/ΜL (ref 0–0.1)
BASOPHILS NFR BLD AUTO: 0 % (ref 0–1)
BILIRUB SERPL-MCNC: 0.29 MG/DL
BILIRUB UR QL STRIP: NEGATIVE
BUN SERPL-MCNC: 29 MG/DL (ref 5–25)
CALCIUM SERPL-MCNC: 8.9 MG/DL (ref 8.4–10.2)
CHLORIDE SERPL-SCNC: 110 MMOL/L (ref 97–108)
CLARITY UR: CLEAR
CO2 SERPL-SCNC: 25 MMOL/L (ref 22–30)
COLOR UR: YELLOW
CREAT SERPL-MCNC: 1.27 MG/DL (ref 0.7–1.5)
EOSINOPHIL # BLD AUTO: 0.1 THOUSAND/ΜL (ref 0–0.4)
EOSINOPHIL NFR BLD AUTO: 1 % (ref 0–6)
ERYTHROCYTE [DISTWIDTH] IN BLOOD BY AUTOMATED COUNT: 15.9 %
GFR SERPL CREATININE-BSD FRML MDRD: 53 ML/MIN/1.73SQ M
GLUCOSE SERPL-MCNC: 99 MG/DL (ref 70–99)
GLUCOSE UR STRIP-MCNC: NEGATIVE MG/DL
HCT VFR BLD AUTO: 26.9 % (ref 41–53)
HGB BLD-MCNC: 8.9 G/DL (ref 13.5–17.5)
HGB UR QL STRIP.AUTO: 150
KETONES UR STRIP-MCNC: NEGATIVE MG/DL
LACTATE SERPL-SCNC: 1.3 MMOL/L (ref 0.7–2)
LEUKOCYTE ESTERASE UR QL STRIP: NEGATIVE
LYMPHOCYTES # BLD AUTO: 0.2 THOUSANDS/ΜL (ref 0.5–4)
LYMPHOCYTES NFR BLD AUTO: 3 % (ref 25–45)
MCH RBC QN AUTO: 29.4 PG (ref 26–34)
MCHC RBC AUTO-ENTMCNC: 32.9 G/DL (ref 31–36)
MCV RBC AUTO: 89 FL (ref 80–100)
MONOCYTES # BLD AUTO: 0.8 THOUSAND/ΜL (ref 0.2–0.9)
MONOCYTES NFR BLD AUTO: 9 % (ref 1–10)
MUCOUS THREADS UR QL AUTO: ABNORMAL
NEUTROPHILS # BLD AUTO: 8.1 THOUSANDS/ΜL (ref 1.8–7.8)
NEUTS SEG NFR BLD AUTO: 87 % (ref 45–65)
NITRITE UR QL STRIP: NEGATIVE
NON-SQ EPI CELLS URNS QL MICRO: ABNORMAL /HPF
NT-PROBNP SERPL-MCNC: 1410 PG/ML (ref 0–299)
PH UR STRIP.AUTO: 6 [PH]
PLATELET # BLD AUTO: 219 THOUSANDS/UL (ref 150–450)
PMV BLD AUTO: 9.8 FL (ref 8.9–12.7)
POTASSIUM SERPL-SCNC: 4.2 MMOL/L (ref 3.6–5)
PROT SERPL-MCNC: 7.1 G/DL (ref 5.9–8.4)
PROT UR STRIP-MCNC: ABNORMAL MG/DL
RBC # BLD AUTO: 3.01 MILLION/UL (ref 4.5–5.9)
RBC #/AREA URNS AUTO: ABNORMAL /HPF
SARS-COV-2 RNA RESP QL NAA+PROBE: NEGATIVE
SODIUM SERPL-SCNC: 140 MMOL/L (ref 137–147)
SP GR UR STRIP.AUTO: 1.01 (ref 1–1.04)
TROPONIN I SERPL-MCNC: <0.01 NG/ML (ref 0–0.03)
UROBILINOGEN UA: NEGATIVE MG/DL
WBC # BLD AUTO: 9.3 THOUSAND/UL (ref 4.5–11)
WBC #/AREA URNS AUTO: ABNORMAL /HPF

## 2021-05-05 PROCEDURE — 99235 HOSP IP/OBS SAME DATE MOD 70: CPT | Performed by: INTERNAL MEDICINE

## 2021-05-05 PROCEDURE — 36415 COLL VENOUS BLD VENIPUNCTURE: CPT | Performed by: EMERGENCY MEDICINE

## 2021-05-05 PROCEDURE — 85025 COMPLETE CBC W/AUTO DIFF WBC: CPT | Performed by: EMERGENCY MEDICINE

## 2021-05-05 PROCEDURE — NC001 PR NO CHARGE: Performed by: INTERNAL MEDICINE

## 2021-05-05 PROCEDURE — U0005 INFEC AGEN DETEC AMPLI PROBE: HCPCS | Performed by: EMERGENCY MEDICINE

## 2021-05-05 PROCEDURE — 80053 COMPREHEN METABOLIC PANEL: CPT | Performed by: EMERGENCY MEDICINE

## 2021-05-05 PROCEDURE — U0003 INFECTIOUS AGENT DETECTION BY NUCLEIC ACID (DNA OR RNA); SEVERE ACUTE RESPIRATORY SYNDROME CORONAVIRUS 2 (SARS-COV-2) (CORONAVIRUS DISEASE [COVID-19]), AMPLIFIED PROBE TECHNIQUE, MAKING USE OF HIGH THROUGHPUT TECHNOLOGIES AS DESCRIBED BY CMS-2020-01-R: HCPCS | Performed by: EMERGENCY MEDICINE

## 2021-05-05 PROCEDURE — 99285 EMERGENCY DEPT VISIT HI MDM: CPT | Performed by: EMERGENCY MEDICINE

## 2021-05-05 PROCEDURE — 87040 BLOOD CULTURE FOR BACTERIA: CPT | Performed by: EMERGENCY MEDICINE

## 2021-05-05 PROCEDURE — 81001 URINALYSIS AUTO W/SCOPE: CPT | Performed by: EMERGENCY MEDICINE

## 2021-05-05 PROCEDURE — 83605 ASSAY OF LACTIC ACID: CPT | Performed by: EMERGENCY MEDICINE

## 2021-05-05 PROCEDURE — 1124F ACP DISCUSS-NO DSCNMKR DOCD: CPT | Performed by: EMERGENCY MEDICINE

## 2021-05-05 PROCEDURE — 99285 EMERGENCY DEPT VISIT HI MDM: CPT

## 2021-05-05 PROCEDURE — 84484 ASSAY OF TROPONIN QUANT: CPT | Performed by: EMERGENCY MEDICINE

## 2021-05-05 PROCEDURE — 71045 X-RAY EXAM CHEST 1 VIEW: CPT

## 2021-05-05 PROCEDURE — 83880 ASSAY OF NATRIURETIC PEPTIDE: CPT | Performed by: EMERGENCY MEDICINE

## 2021-05-05 RX ORDER — MORPHINE SULFATE 15 MG/1
15 TABLET, FILM COATED, EXTENDED RELEASE ORAL EVERY 12 HOURS SCHEDULED
Status: DISCONTINUED | OUTPATIENT
Start: 2021-05-05 | End: 2021-05-05 | Stop reason: HOSPADM

## 2021-05-05 RX ORDER — ASPIRIN 81 MG/1
81 TABLET, CHEWABLE ORAL DAILY
Status: DISCONTINUED | OUTPATIENT
Start: 2021-05-05 | End: 2021-05-05 | Stop reason: HOSPADM

## 2021-05-05 RX ORDER — METOPROLOL TARTRATE 100 MG/1
100 TABLET ORAL 2 TIMES DAILY
COMMUNITY
Start: 2021-04-22 | End: 2022-04-22

## 2021-05-05 RX ORDER — ACYCLOVIR 200 MG/1
400 CAPSULE ORAL DAILY
Status: DISCONTINUED | OUTPATIENT
Start: 2021-05-05 | End: 2021-05-05 | Stop reason: HOSPADM

## 2021-05-05 RX ORDER — METOPROLOL TARTRATE 50 MG/1
100 TABLET, FILM COATED ORAL DAILY
Status: DISCONTINUED | OUTPATIENT
Start: 2021-05-05 | End: 2021-05-05 | Stop reason: HOSPADM

## 2021-05-05 RX ORDER — ACETAMINOPHEN 325 MG/1
650 TABLET ORAL ONCE
Status: COMPLETED | OUTPATIENT
Start: 2021-05-05 | End: 2021-05-05

## 2021-05-05 RX ORDER — OMEPRAZOLE 40 MG/1
CAPSULE, DELAYED RELEASE ORAL
COMMUNITY
Start: 2021-03-25

## 2021-05-05 RX ORDER — MORPHINE SULFATE 15 MG/1
15 TABLET, FILM COATED, EXTENDED RELEASE ORAL EVERY 12 HOURS
COMMUNITY
Start: 2021-04-27

## 2021-05-05 RX ORDER — ACYCLOVIR 400 MG/1
400 TABLET ORAL 2 TIMES DAILY
COMMUNITY
Start: 2021-02-08

## 2021-05-05 RX ORDER — PANTOPRAZOLE SODIUM 40 MG/1
40 TABLET, DELAYED RELEASE ORAL
Status: DISCONTINUED | OUTPATIENT
Start: 2021-05-05 | End: 2021-05-05 | Stop reason: HOSPADM

## 2021-05-05 RX ADMIN — PANTOPRAZOLE SODIUM 40 MG: 40 TABLET, DELAYED RELEASE ORAL at 09:58

## 2021-05-05 RX ADMIN — APIXABAN 5 MG: 5 TABLET, FILM COATED ORAL at 09:57

## 2021-05-05 RX ADMIN — MORPHINE SULFATE 15 MG: 15 TABLET, EXTENDED RELEASE ORAL at 09:57

## 2021-05-05 RX ADMIN — SODIUM CHLORIDE 1000 ML: 0.9 INJECTION, SOLUTION INTRAVENOUS at 15:00

## 2021-05-05 RX ADMIN — ASPIRIN 81 MG: 81 TABLET, CHEWABLE ORAL at 09:57

## 2021-05-05 RX ADMIN — ACETAMINOPHEN 650 MG: 325 TABLET ORAL at 06:44

## 2021-05-05 NOTE — ED PROVIDER NOTES
History  Chief Complaint   Patient presents with    Chills     Pt came to ER for fever and chills  Pt took tylenol at home 1 hour PTA to ER  Temp was 40       81 y/o W male here - c/o fever, chills, intermittent and productive cough, dyspnea, and weakness - beginning this morning  Patient with history of multiple myeloma and bone CA on chemotherapy monthly  Patient followed by  [facility]  Last chemotherapy treatment was 4/15  Denies nausea/vomiting; no medications taken for fever  Patient with afib - on Eliquis and compliant with medications  Patient is cooperative, pleasant, and in NAD  Prior to Admission Medications   Prescriptions Last Dose Informant Patient Reported? Taking? Calcium 500-100 MG-UNIT CHEW Unknown at Unknown time  Yes No   Sig: Chew 1 tablet daily   acyclovir (ZOVIRAX) 400 MG tablet Unknown at Unknown time  Yes No   Sig: Take 400 mg by mouth 2 (two) times a day   apixaban (ELIQUIS) 5 mg Unknown at Unknown time  Yes No   Sig: Take 5 mg by mouth 2 (two) times a day   aspirin 81 mg chewable tablet Unknown at Unknown time  Yes No   Sig: Chew 81 mg daily   metoprolol tartrate (LOPRESSOR) 100 mg tablet Unknown at Unknown time  Yes No   Sig: Take 100 mg by mouth 2 (two) times a day   morphine (MS CONTIN) 15 mg 12 hr tablet Unknown at Unknown time  Yes No   Sig: Take 15 mg by mouth every 12 (twelve) hours   omeprazole (PriLOSEC) 40 MG capsule Unknown at Unknown time  Yes No   Sig: TAKE 1 CAPSULE BY MOUTH DIARIAMENTE      Facility-Administered Medications: None       Past Medical History:   Diagnosis Date    Bone cancer ([facility] [address] )     Hiatal hernia with GERD     History of chemotherapy     Pt goes for treatments monthly and has been on chemo tx for 5 years   Hypertension     Iron deficiency anemia     Multiple myeloma (HCC)     Neutropenia (HCC)     Systolic murmur        No past surgical history on file  No family history on file    I have reviewed and agree with the history as documented  E-Cigarette/Vaping    E-Cigarette Use Never User      E-Cigarette/Vaping Substances    Nicotine No     THC No     CBD No     Flavoring No     Other No     Unknown No      Social History     Tobacco Use    Smoking status: Never Smoker    Smokeless tobacco: Never Used   Substance Use Topics    Alcohol use: Not Currently    Drug use: Never       Review of Systems   Constitutional: Positive for chills and fever  Respiratory: Positive for cough, chest tightness and shortness of breath  Neurological: Positive for weakness  All other systems reviewed and are negative  Physical Exam  Physical Exam  Vitals signs and nursing note reviewed  Constitutional:       Appearance: Normal appearance  He is normal weight  HENT:      Head: Normocephalic and atraumatic  Right Ear: Tympanic membrane and ear canal normal       Left Ear: Tympanic membrane and ear canal normal       Nose: Nose normal       Mouth/Throat:      Mouth: Mucous membranes are dry  Pharynx: Oropharynx is clear  Eyes:      Extraocular Movements: Extraocular movements intact  Conjunctiva/sclera: Conjunctivae normal       Pupils: Pupils are equal, round, and reactive to light  Neck:      Musculoskeletal: Normal range of motion and neck supple  Cardiovascular:      Rate and Rhythm: Normal rate and regular rhythm  Pulses: Normal pulses  Heart sounds: Normal heart sounds  Pulmonary:      Effort: No respiratory distress  Breath sounds: Normal air entry  Transmitted upper airway sounds present  Examination of the right-upper field reveals decreased breath sounds and rhonchi  Examination of the left-upper field reveals decreased breath sounds and rhonchi  Examination of the right-middle field reveals rhonchi  Examination of the left-middle field reveals rhonchi  Decreased breath sounds and rhonchi present  Abdominal:      General: Bowel sounds are normal       Palpations: Abdomen is soft  Musculoskeletal: Normal range of motion  Skin:     Capillary Refill: Capillary refill takes less than 2 seconds  Coloration: Skin is pale  Neurological:      General: No focal deficit present  Mental Status: He is alert and oriented to person, place, and time  Mental status is at baseline  Psychiatric:         Mood and Affect: Mood normal          Behavior: Behavior normal          Thought Content: Thought content normal          Judgment: Judgment normal          Vital Signs  ED Triage Vitals   Temperature Pulse Respirations Blood Pressure SpO2   05/05/21 0617 05/05/21 0617 05/05/21 0617 05/05/21 0617 05/05/21 0617   (!) 100 7 °F (38 2 °C) 82 20 113/65 94 %      Temp Source Heart Rate Source Patient Position - Orthostatic VS BP Location FiO2 (%)   05/05/21 0617 05/05/21 0617 05/05/21 0617 05/05/21 0617 --   Tympanic Monitor Lying Left arm       Pain Score       05/05/21 0644       Med Not Given for Pain - for MAR use only           Vitals:    05/05/21 0855 05/05/21 1122 05/05/21 1330 05/05/21 1611   BP: 125/68 112/65  137/93   Pulse: 61 67 56 70   Patient Position - Orthostatic VS: Lying   Lying         Visual Acuity      ED Medications  Medications   acetaminophen (TYLENOL) tablet 650 mg (650 mg Oral Given 5/5/21 0644)   sodium chloride 0 9 % bolus 1,000 mL (0 mL Intravenous Stopped 5/5/21 1611)       Diagnostic Studies  Results Reviewed     Procedure Component Value Units Date/Time    Blood culture #1 [[id number]] Collected: 05/05/21 0847    Lab Status: Preliminary result Specimen: Blood from Arm, Right Updated: 05/08/21 2102     Blood Culture No Growth at 72 hrs  Blood culture #2 [[id number]] Collected: 05/05/21 0846    Lab Status: Preliminary result Specimen: Blood from Arm, Left Updated: 05/08/21 2102     Blood Culture No Growth at 72 hrs      Urine Microscopic [[id number]]  (Abnormal) Collected: 05/05/21 1051    Lab Status: Final result Specimen: Urine, Clean Catch Updated: 05/05/21 1158 RBC, UA 10-20 /hpf      WBC, UA 0-1 /hpf      Epithelial Cells Occasional /hpf      Bacteria, UA Occasional /hpf      MUCUS THREADS Occasional    UA (URINE) with reflex to Scope [[id number]]  (Abnormal) Collected: 05/05/21 1051    Lab Status: Final result Specimen: Urine, Clean Catch Updated: 05/05/21 1115     Color, UA Yellow     Clarity, UA Clear     Specific Gravity, UA 1 010     pH, UA 6 0     Leukocytes, UA Negative     Nitrite, UA Negative     Protein, UA 15 (Trace) mg/dl      Glucose, UA Negative mg/dl      Ketones, UA Negative mg/dl      Bilirubin, UA Negative     Blood,  0     UROBILINOGEN UA Negative mg/dL     Novel Coronavirus (Covid-19),PCR [facility] - 2 Hour Stat [[id number]]  (Normal) Collected: 05/05/21 0644    Lab Status: Final result Specimen: Nares from Nose Updated: 05/05/21 0746     SARS-CoV-2 Negative    Narrative: The specimen collection materials, transport medium, and/or testing methodology utilized in the production of these test results have been proven to be reliable in a limited validation with an abbreviated program under the Emergency Utilization Authorization provided by the FDA  Testing reported as "Presumptive positive" will be confirmed with secondary testing to ensure result accuracy  Clinical caution and judgement should be used with the interpretation of these results with consideration of the clinical impression and other laboratory testing  Testing reported as "Positive" or "Negative" has been proven to be accurate according to standard laboratory validation requirements  All testing is performed with control materials showing appropriate reactivity at standard intervals        NT-BNP PRO [[id number]]  (Abnormal) Collected: 05/05/21 0644    Lab Status: Final result Specimen: Blood from Arm, Left Updated: 05/05/21 0723     NT-proBNP 1,410 pg/mL     Troponin I [[id number]]  (Normal) Collected: 05/05/21 0644    Lab Status: Final result Specimen: Blood from Arm, Left Updated: 05/05/21 0716     Troponin I <0 01 ng/mL     Lactic acid [[id number]]  (Normal) Collected: 05/05/21 0644    Lab Status: Final result Specimen: Blood from Arm, Left Updated: 05/05/21 0704     LACTIC ACID 1 3 mmol/L     Narrative:      Result may be elevated if tourniquet was used during collection      Comprehensive metabolic panel [[id number]]  (Abnormal) Collected: 05/05/21 0644    Lab Status: Final result Specimen: Blood from Arm, Left Updated: 05/05/21 0704     Sodium 140 mmol/L      Potassium 4 2 mmol/L      Chloride 110 mmol/L      CO2 25 mmol/L      ANION GAP 5 mmol/L      BUN 29 mg/dL      Creatinine 1 27 mg/dL      Glucose 99 mg/dL      Calcium 8 9 mg/dL      AST 19 U/L      ALT 11 U/L      Alkaline Phosphatase 55 U/L      Total Protein 7 1 g/dL      Albumin 3 6 g/dL      Total Bilirubin 0 29 mg/dL      eGFR 53 ml/min/1 73sq m     Narrative:      [place] guidelines for Chronic Kidney Disease (CKD):     Stage 1 with normal or high GFR (GFR > 90 mL/min/1 73 square meters)    Stage 2 Mild CKD (GFR = 60-89 mL/min/1 73 square meters)    Stage 3A Moderate CKD (GFR = 45-59 mL/min/1 73 square meters)    Stage 3B Moderate CKD (GFR = 30-44 mL/min/1 73 square meters)    Stage 4 Severe CKD (GFR = 15-29 mL/min/1 73 square meters)    Stage 5 End Stage CKD (GFR <15 mL/min/1 73 square meters)  Note: GFR calculation is accurate only with a steady state creatinine    CBC and differential [[id number]]  (Abnormal) Collected: 05/05/21 0644    Lab Status: Final result Specimen: Blood from Arm, Left Updated: 05/05/21 0655     WBC 9 30 Thousand/uL      RBC 3 01 Million/uL      Hemoglobin 8 9 g/dL      Hematocrit 26 9 %      MCV 89 fL      MCH 29 4 pg      MCHC 32 9 g/dL      RDW 15 9 %      MPV 9 8 fL      Platelets 860 Thousands/uL      Neutrophils Relative 87 %      Lymphocytes Relative 3 %      Monocytes Relative 9 %      Eosinophils Relative 1 %      Basophils Relative 0 %      Neutrophils Absolute 8 10 Thousands/µL      Lymphocytes Absolute 0 20 Thousands/µL      Monocytes Absolute 0 80 Thousand/µL      Eosinophils Absolute 0 10 Thousand/µL      Basophils Absolute 0 00 Thousands/µL                  XR chest 1 view portable   Final Result by Ruma Flaherty MD (05/05 1100)   No acute cardiopulmonary disease  Stable exam            Workstation performed: [id number]                    Procedures  Procedures         ED Course                             SBIRT 22yo+      Most Recent Value   SBIRT (24 yo +)   In order to provide better care to our patients, we are screening all of our patients for alcohol and drug use  Would it be okay to ask you these screening questions? No Filed at: 05/05/2021 1773                    MDM    Disposition  Final diagnoses:   Fever   Chills   Multiple myeloma ([address] )     Time reflects when diagnosis was documented in both MDM as applicable and the Disposition within this note     Time User Action Codes Description Comment    5/5/2021  8:46 AM Lexi Sprang Add [R50 9] Fever     5/5/2021  8:46 AM Lexi Sprang Add [R68 83] Chills     5/5/2021  8:46 AM Lexi Sprang Add [C90 00] Multiple myeloma [facility])       ED Disposition     ED Disposition Condition Date/Time Comment    Discharge  Wed May 5, 2021  4:33 PM Case was discussed with Dr Mónica Davies and the patient's admission status was agreed to be Admission Status: observation status to the service of Dr Mónica Davies           Follow-up Information    None         Discharge Medication List as of 5/5/2021  2:42 PM      CONTINUE these medications which have NOT CHANGED    Details   acyclovir (ZOVIRAX) 400 MG tablet Take 400 mg by mouth 2 (two) times a day, Starting Mon 2/8/2021, Historical Med      apixaban (ELIQUIS) 5 mg Take 5 mg by mouth 2 (two) times a day, Starting Fri 3/19/2021, Historical Med      aspirin 81 mg chewable tablet Chew 81 mg daily, Historical Med      Calcium 500-100 MG-UNIT CHEW Chew 1 tablet daily, Historical Med metoprolol tartrate (LOPRESSOR) 100 mg tablet Take 100 mg by mouth 2 (two) times a day, Starting Thu 4/22/2021, Until Fri 4/22/2022, Historical Med      morphine (MS CONTIN) 15 mg 12 hr tablet Take 15 mg by mouth every 12 (twelve) hours, Starting Tue 4/27/2021, Historical Med      omeprazole (PriLOSEC) 40 MG capsule TAKE 1 CAPSULE BY MOUTH DIARIAMENTE, Historical Med           Outpatient Discharge Orders   Discharge Diet     Activity as tolerated       PDMP Review     None          ED Provider  Electronically Signed by           Jean Devine DO  05/09/21 0700

## 2021-05-05 NOTE — DISCHARGE SUMMARY
[facility]  Discharge- Patricia Thomas 1940, 80 y o  male MRN: [id number]  Unit/Bed#: ED 03 Encounter: [id number]  Primary Care Provider: No primary care provider on file  Date and time admitted to hospital: 5/5/2021  6:09 AM    * Fever  Assessment & Plan  · One episode last night after exertion while working at his [employer]  · No URI or dysuria   · No abdominal pain  · Tmax in  7F  · CXR no consolidation  · Suspect fever non infectious   · Resume prophylactic antiviral   · Blood cx obtained in ED  · No leukocytosis , LA normal   · Recommend discharge home to follow up with PCP in a week  · Patient was in agreement with discharge plan        Paroxysmal atrial fibrillation [facility])  Assessment & Plan  Resume prehospital BB and eliquis     Multiple myeloma ([facility] [address] )  Assessment & Plan  Follow up with Hem/on at [facility]  Last chemo 4/15  Continue outpatient follow up   Based on history and clinical exam infectious cause of fever is low    Hiatal hernia with GERD  Assessment & Plan  Resume prehospital PPI    Essential hypertension  Assessment & Plan  Resume prehospital metoprolol         Discharging Physician / Practitioner: Sylvia Roberto MD  PCP: No primary care provider on file  Admission Date:   Admission Orders (From admission, onward)     Ordered        05/05/21 0845  Place in Observation  Once                   Discharge Date: 05/05/21    Resolved Problems  Date Reviewed: 5/5/2021    None          Consultations During Hospital Stay:  · None    Procedures Performed:   · Chest x-ray    Significant Findings / Test Results:   · No consolidation    Incidental Findings:   · None     Test Results Pending at Discharge (will require follow up):    · Final blood cultures     Outpatient Tests Requested:  · None    Complications:  None    Reason for Admission:  Fever    Hospital Course:     Patricia Thomas is a 80 y o  male patient who originally presented to the hospital on 5/5/2021 due to fever     His initial CBC and chest x-ray did not reveal evidence of infection  He received 1 L of IV normal saline  Based on history and physical exam with thought his fever is likely noninfectious  T-max in the ED was 100 7 F  Prior to discharge 80 F after receiving Tylenol  Was feeling back to baseline and felt he over did the work at the [employer] yesterday  All questions and concerns addressed  [place] interpretation facilitated by ipad service  Please see above list of diagnoses and related plan for additional information  Condition at Discharge: good     Discharge Day Visit / Exam:     * Please refer to separate progress note for these details *    Discussion with Family: Grand daughter  Discharge instructions/Information to patient and family:   See after visit summary for information provided to patient and family  Provisions for Follow-Up Care:  See after visit summary for information related to follow-up care and any pertinent home health orders  Disposition:     Home    For Discharges to [facility] SNF:   · Not Applicable to this Patient - Not Applicable to this Patient    Planned Readmission: None     Discharge Statement:  I spent 15 minutes discharging the patient  This time was spent on the day of discharge  I had direct contact with the patient on the day of discharge  Greater than 50% of the total time was spent examining patient, answering all patient questions, arranging and discussing plan of care with patient as well as directly providing post-discharge instructions  Additional time then spent on discharge activities  Discharge Medications:  See after visit summary for reconciled discharge medications provided to patient and family        ** Please Note: This note has been constructed using a voice recognition system **

## 2021-05-05 NOTE — ED CARE HANDOFF
Emergency Department Sign Out Note        Sign out and transfer of care from Dr Ken Echols  See Separate Emergency Department note  The patient, Festus Christian, was evaluated by the previous provider for URI sx  Workup Completed:  H&P, cxr    ED Course / Workup Pending (followup): Follow up labs, reassess  Workup results are noted  I spoke to Dr [facility] heme/onc)  The patient white blood cell count is slightly higher than his baseline which is typically between four and five  Given that the patient is otherwise well-appearing and stable she stated that the patient would be fine for an observation admission at this facility  I spoke with patient was in agreement with this plan  Case was discussed with the slim admitting physician who accepts the admission  Procedures  MDM    Disposition  Final diagnoses:   None     ED Disposition     None      Follow-up Information    None       Patient's Medications   Discharge Prescriptions    No medications on file     No discharge procedures on file         ED Provider  Electronically Signed by     José Manuel Esparza DO  05/05/21 7051

## 2021-05-05 NOTE — ASSESSMENT & PLAN NOTE
Follow up with Hem/on at [facility]  Last chemo 4/15  Continue outpatient follow up   Based on history and clinical exam infectious cause of fever is low

## 2021-05-05 NOTE — H&P
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 [address] )  Assessment & Plan  Follow up with Hem/on at [facility]  Last chemo 4/15  Continue outpatient follow up   Based on history and clinical exam infectious cause of fever is low    Hiatal hernia with GERD  Assessment & Plan  Resume prehospital PPI    Essential hypertension  Assessment & Plan  Resume prehospital metoprolol     VTE Prophylaxis: Apixaban (Eliquis)  / sequential compression device   Code Status:  Full code  POLST: POLST form is not discussed and not completed at this time  Discussion with family:  Discussed with granddaughter over the phone    Anticipated Length of Stay:  Patient will be admitted on an Observation basis with an anticipated length of stay of  less than 2 midnights  Justification for Hospital Stay:  Fever  Total Time for Visit, including Counseling / Coordination of Care: 45 minutes  Greater than 50% of this total time spent on direct patient counseling and coordination of care      Chief Complaint:   Fever    History of Present Illness:    Gael Addison is a 80 y o  male who presents with past medical history of paroxysmal atrial fibrillation on Eliquis, multiple myeloma on chemotherapy, hypertension and GERD complaining of fever episode since last night  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 and later in the evening started to have a fever episode with chills measured his temperature 37° C without shortness of breath coughing, nausea, vomiting, abdominal pain, runny nose or sore throat  He denied any sick contacts at home  He has been vaccinated for COVID-19  His last chemotherapy was April 1521  He continue to follow-up with cardiology and heme oncology at [facility]   He has good appetite and feels in his usual health otherwise  He believe he over did the work last night  Review of Systems:    Review of Systems   Constitutional: Positive for chills and fever  HENT: Negative for rhinorrhea and sore throat  Eyes: Negative for pain and visual disturbance  Respiratory: Negative for cough and shortness of breath  Cardiovascular: Negative for chest pain and palpitations  Gastrointestinal: Negative for abdominal pain, nausea and vomiting  Genitourinary: Negative for difficulty urinating and dysuria  Musculoskeletal: Positive for back pain  Negative for arthralgias and neck pain  Skin: Negative for color change and rash  Neurological: Positive for headaches  Negative for light-headedness  Psychiatric/Behavioral: Negative for confusion and hallucinations  All other systems reviewed and are negative  Past Medical and Surgical History:     Past Medical History:   Diagnosis Date    Bone cancer ([facility] [address] )     Hiatal hernia with GERD     History of chemotherapy     Pt goes for treatments monthly and has been on chemo tx for 5 years   Hypertension     Iron deficiency anemia     Multiple myeloma (HCC)     Neutropenia (HCC)     Systolic murmur        No past surgical history on file  Meds/Allergies:    Prior to Admission medications    Medication Sig Start Date End Date Taking?  Authorizing Provider   acyclovir (ZOVIRAX) 400 MG tablet Take 400 mg by mouth 2 (two) times a day 12/17/18 5/5/21 Yes Historical Provider, MD   apixaban (ELIQUIS) 5 mg Take 1 tablet (5 mg total) by mouth 2 (two) times a day 2/16/21 5/5/21 Yes Kalyan Eng DO   dexamethasone (DECADRON) 4 mg tablet Take 20 mg(5 tablets) po on Days 1, 2, 4, 5, 8, 9, 11, 12 on Cycles 1-8  3/19/19 5/5/21 Yes Historical Provider, MD   morphine (MS CONTIN) 15 mg 12 hr tablet Take by mouth 2 (two) times a day 4/26/19 5/5/21 Yes Historical Provider, MD   omeprazole (PriLOSEC) 40 MG capsule Take 40 mg by mouth daily 4/27/19 5/5/21 Yes Historical Provider, MD   oxyCODONE-acetaminophen (PERCOCET) 5-325 mg per tablet Take 1 tablet by mouth every 4 (four) hours as needed for moderate pain  5/5/21 Yes Historical Provider, MD   Pomalidomide (POMALYST) 3 MG CAPS Take 3 mg by mouth daily 3/22/19 5/5/21 Yes Historical Provider, MD   prochlorperazine (COMPAZINE) 10 mg tablet Take 10 mg by mouth every 6 (six) hours as needed 4/29/19 5/5/21 Yes Historical Provider, MD   senna-docusate sodium (SENOKOT-S) 8 6-50 mg per tablet Take 1 tablet by mouth daily  5/5/21 Yes Historical Provider, MD   acyclovir (ZOVIRAX) 400 MG tablet Take 400 mg by mouth 2 (two) times a day 2/8/21   Historical Provider, MD   apixaban (ELIQUIS) 5 mg Take 5 mg by mouth 2 (two) times a day 3/19/21   Historical Provider, MD   aspirin 81 mg chewable tablet Chew 81 mg daily    Historical Provider, MD   Calcium 500-100 MG-UNIT CHEW Chew 1 tablet daily    Historical Provider, MD   metoprolol tartrate (LOPRESSOR) 100 mg tablet Take 100 mg by mouth 2 (two) times a day 4/22/21 4/22/22  Historical Provider, MD   morphine (MS CONTIN) 15 mg 12 hr tablet Take 15 mg by mouth every 12 (twelve) hours 4/27/21   Historical Provider, MD   omeprazole (PriLOSEC) 40 MG capsule TAKE 1 CAPSULE BY MOUTH Isamar Recinos 3/25/21   Historical Provider, MD   metoprolol tartrate 75 MG TABS Take 1 tablet (75 mg total) by mouth 2 (two) times a day  Patient not taking: Reported on 5/5/2021 2/16/21 5/5/21  Merna Gomez DO     I have reviewed home medications with a medical source (PCP, Pharmacy, other)  Allergies: No Known Allergies    Social History:     Marital Status: /Civil Union   Occupation:  Retired  Patient Pre-hospital Living Situation:  With granddaughters  Patient Pre-hospital Level of Mobility:  No assisted device  Patient Pre-hospital Diet Restrictions:  No restriction reported  Substance Use History:   Social History     Substance and Sexual Activity   Alcohol Use Not Currently     Social History     Tobacco Use   Smoking Status Never Smoker   Smokeless Tobacco Never Used     Social History     Substance and Sexual Activity   Drug Use Never       Family History:    non-contributory    Physical Exam:     Vitals:   Blood Pressure: 112/65 (05/05/21 1122)  Pulse: 56 (05/05/21 1330)  Temperature: (!) 97 3 °F (36 3 °C) (05/05/21 1122)  Temp Source: Tympanic (05/05/21 1122)  Respirations: 20 (05/05/21 1122)  Weight - Scale: 82 1 kg (181 lb 1 6 oz) (05/05/21 0617)  SpO2: 98 % (05/05/21 1330)    Physical Exam  Vitals signs reviewed  Constitutional:       General: He is not in acute distress  Appearance: Normal appearance  He is obese  He is not ill-appearing  HENT:      Head: Normocephalic and atraumatic  Right Ear: External ear normal       Left Ear: External ear normal       Nose: No rhinorrhea  Mouth/Throat:      Pharynx: Oropharynx is clear  No oropharyngeal exudate  Eyes:      General:         Right eye: No discharge  Left eye: No discharge  Neck:      Musculoskeletal: Neck supple  No neck rigidity  Cardiovascular:      Rate and Rhythm: Normal rate  Rhythm irregular  Heart sounds: Normal heart sounds  No murmur  Pulmonary:      Effort: Pulmonary effort is normal  No respiratory distress  Breath sounds: Normal breath sounds  No wheezing  Abdominal:      General: Bowel sounds are normal  There is no distension  Palpations: Abdomen is soft  Tenderness: There is no abdominal tenderness  There is no guarding  Musculoskeletal:         General: Tenderness (Lower extremity) present  Right lower leg: No edema  Left lower leg: No edema  Skin:     General: Skin is dry  Neurological:      General: No focal deficit present  Mental Status: He is alert and oriented to person, place, and time  Psychiatric:         Behavior: Behavior normal            Additional Data:     Lab Results: I have personally reviewed pertinent reports  Results from last 7 days   Lab Units 05/05/21  0644   WBC Thousand/uL 9 30   HEMOGLOBIN g/dL 8 9*   HEMATOCRIT % 26 9*   PLATELETS Thousands/uL 219   NEUTROS PCT % 87*   LYMPHS PCT % 3*   MONOS PCT % 9   EOS PCT % 1     Results from last 7 days   Lab Units 05/05/21  0644   SODIUM mmol/L 140   POTASSIUM mmol/L 4 2   CHLORIDE mmol/L 110*   CO2 mmol/L 25   BUN mg/dL 29*   CREATININE mg/dL 1 27   ANION GAP mmol/L 5   CALCIUM mg/dL 8 9   ALBUMIN g/dL 3 6   TOTAL BILIRUBIN mg/dL 0 29   ALK PHOS U/L 55   ALT U/L 11   AST U/L 19   GLUCOSE RANDOM mg/dL 99                 Results from last 7 days   Lab Units 05/05/21  0644   LACTIC ACID mmol/L 1 3       Imaging: I have personally reviewed pertinent reports  XR chest 1 view portable   Final Result by Benton Gaucher, MD (05/05 1100)   No acute cardiopulmonary disease  Stable exam            Workstation performed: [id number]             EKG, Pathology, and Other Studies Reviewed on Admission:       Allscripts / [facility] Records Reviewed: Yes     ** Please Note: This note has been constructed using a voice recognition system   **

## 2021-05-05 NOTE — Clinical Note
Case was discussed with Dr Devin Taylor and the patient's admission status was agreed to be Admission Status: observation status to the service of Dr Devin Taylor

## 2021-05-05 NOTE — ED NOTES
Assisted pt with using urinal to urinate  After assisting, pt asked for emesis bag  Pt coughed scant amt of blood in bag        Trino Ordonez RN  05/05/21 6433

## 2021-05-05 NOTE — ASSESSMENT & PLAN NOTE
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

## 2021-05-05 NOTE — DISCHARGE INSTRUCTIONS
Fiebre en adultos   LO QUE NECESITA SABER:   La fiebre es un aumento en la temperatura corporal  La temperatura normal del cuerpo es de 98 6°F (37°C)  La temperatura se considera alex fiebre cuando alcanza más 100 4°F (38°C)  Las causas frecuentes incluyen alex infección, alex lesión o alex enfermedad, marisela la artritis  INSTRUCCIONES SOBRE EL RODRI HOSPITALARIA:   Regrese a la edgar de emergencias si:  · Mittal fiebre no desaparece, o empeora aún después del tratamiento  · Usted tiene el ghulam rígido y mucho dolor de [place]  · Usted está confundido  Es probable que no pueda pensar claramente o recordar cosas de la manera habitual     · Mittal corazón late más rápido de lo normal incluso después del tratamiento  · Usted tiene falta de aliento o tiene dolor de pecho cuando respira  · Usted orina pequeñas cantidades o no orina nada en absoluto  · Mittal piel, labios o uñas se ponen azules  Comuníquese con mittal médico si:  · Usted tiene dolor en el abdomen o siente mittal estómago inflamado  · Tiene náuseas o está vomitando  · Siente dolor o ardor cuando orina o tiene dolor en la espalda  · Usted tiene preguntas o inquietudes acerca de mittal condición o cuidado  Medicamentos: Es posible que usted necesite alguno de los siguientes:  · Los Humph[place], marisela el ibuprofeno, [place] [facility] a disminuir la inflamación, el dolor y la [place]  David medicamento está disponible con o sin alex receta médica  Los MODESTA pueden causar sangrado estomacal o problemas renales en ciertas personas  Si usted esta tomando un anticoágulante,  siempre  pregunte si los AINEs son seguros para usted  Siempre cipriano la etiqueta de david medicamento y [place] instrucciones  No administre david medicamento a niños menores de 6 meses de cristal sin antes obtener la autorización de mittal médico      · Acetaminofén miguel ángel el dolor y baja la fiebre  Está disponible sin receta médica  Pregunte la cantidad y la frecuencia con que debe tomarlos  Chiquis [address] [place] [address] etiquetas de todos los demás medicamentos que esté usando para saber si también contienen acetaminofén, o pregunte a mittal médico o farmacéutico  El acetaminofén puede causar daño en el hígado cuando no se esmer de forma correcta  No use más de 4 gramos (4000 miligramos) en total de acetaminofeno en un día  · Los antibióticos se puede administrar si tiene alex infección a causa de alguna bacteria  · Charco ashley medicamentos marisela se le haya indicado  Consulte con mittal médico si usted sandy que mittal medicamento no le está ayudando o si presenta efectos secundarios  Infórmele si es alérgico a algún medicamento  Mantenga alex lista actualizada de los [place], las vitaminas y los productos herbales que esmer  Incluya los siguientes datos de los medicamentos: cantidad, frecuencia y motivo de administración  Traiga con usted la lista o los envases de las píldoras a ashley citas de seguimiento  Lleve la lista de los medicamentos con usted en heather de alex emergencia  Acuda a ashley consultas de control con mittal médico según le indicaron  Anote ashley preguntas para que se acuerde de hacerlas evie ashley visitas  Cuidados personales:  · [employer] líquidos marisela se le indique  La fiebre lo hace sudar  [place] puede aumentar mittal riesgo de presentar deshidratación  Los líquidos pueden ayudar a evitar la deshidratación  ? Hilda por lo menos de 6 a 8 vasos de ocho onzas de líquidos wolf cada día  [employer], jugo o caldo  No hilda bebidas deportivas  Pueden contener cafeína  ? Es posible que necesite alex solución de sales de rehidratación oral (SRO o pj oral)  [address] cantidades exactas de agua, sal y azúcar que usted necesita para reemplazar los líquidos corporales  · Vístase con Meka Dimmer y fresca  Los temblores podrían ser alex señal de que la fiebre está aumentando  No ponga más cobijas encima de usted ni se abrigue más  [place] podría provocar que le suba la fiebre [place]  Vístase con Manisha Isidoro y cómoda   Jaden Sink manta liviana o alex sábana al dormir  Cambie mittal ropa, las cobijas o las sábanas si se mojan  · Refrésquese de manera bello  Báñese con agua tibia o fresca  Use alex bolsa de hielo envuelta en alex toalla pequeña o moje un paño con agua fría  Coloque la compresa de hielo o pañuelo húmedo sobre mittal frente o en la parte posterior del ghulam  © Copyright [address] [place] [address] [employer] Information is for End User's use only and may not be sold, redistributed or otherwise used for commercial purposes  All illustrations and images included in CareNotes® are the copyrighted property of A D A M , [employer]  or [address] [place] [address] es sólo para uso en educación  Mittal intención no es darle un consejo médico sobre enfermedades o tratamientos  Colsulte con mittal Phil Danuta farmacéutico antes de seguir cualquier régimen médico para saber si es seguro y efectivo para usted

## 2021-05-05 NOTE — ED NOTES
Pt's blanket removed for cooling purpose, pt agreeable  Given ice water         Alessandra Flores, VANESSA  05/05/21 1677

## 2021-05-10 LAB
BACTERIA BLD CULT: NORMAL
BACTERIA BLD CULT: NORMAL

## 2021-05-17 ENCOUNTER — EPISODE CHANGES (OUTPATIENT)
Dept: CASE MANAGEMENT | Facility: OTHER | Age: 81
End: 2021-05-17

## 2022-01-18 ENCOUNTER — APPOINTMENT (EMERGENCY)
Dept: RADIOLOGY | Facility: HOSPITAL | Age: 82
DRG: 178 | End: 2022-01-18
Payer: MEDICARE

## 2022-01-18 ENCOUNTER — APPOINTMENT (EMERGENCY)
Dept: CT IMAGING | Facility: HOSPITAL | Age: 82
DRG: 178 | End: 2022-01-18
Payer: MEDICARE

## 2022-01-18 ENCOUNTER — HOSPITAL ENCOUNTER (INPATIENT)
Facility: HOSPITAL | Age: 82
LOS: 2 days | Discharge: HOME | DRG: 178 | End: 2022-01-20
Attending: EMERGENCY MEDICINE | Admitting: INTERNAL MEDICINE
Payer: MEDICARE

## 2022-01-18 DIAGNOSIS — U07.1 COVID-19 VIRUS INFECTION: Primary | ICD-10-CM

## 2022-01-18 DIAGNOSIS — N17.9 AKI (ACUTE KIDNEY INJURY) (HCC): ICD-10-CM

## 2022-01-18 PROBLEM — G89.29 CHRONIC PAIN: Status: ACTIVE | Noted: 2022-01-18

## 2022-01-18 LAB
2HR DELTA HS TROPONIN: -2 NG/L
4HR DELTA HS TROPONIN: -5 NG/L
ALBUMIN SERPL BCP-MCNC: 2.7 G/DL (ref 3.5–5)
ALP SERPL-CCNC: 52 U/L (ref 46–116)
ALT SERPL W P-5'-P-CCNC: 25 U/L (ref 12–78)
ANION GAP SERPL CALCULATED.3IONS-SCNC: 14 MMOL/L (ref 4–13)
AST SERPL W P-5'-P-CCNC: 25 U/L (ref 5–45)
ATRIAL RATE: 54 BPM
ATRIAL RATE: 75 BPM
BACTERIA UR QL AUTO: ABNORMAL /HPF
BASOPHILS # BLD AUTO: 0.01 THOUSANDS/ΜL (ref 0–0.1)
BASOPHILS NFR BLD AUTO: 0 % (ref 0–1)
BILIRUB SERPL-MCNC: 0.24 MG/DL (ref 0.2–1)
BILIRUB UR QL STRIP: NEGATIVE
BUN SERPL-MCNC: 35 MG/DL (ref 5–25)
CALCIUM ALBUM COR SERPL-MCNC: 9.1 MG/DL (ref 8.3–10.1)
CALCIUM SERPL-MCNC: 8.1 MG/DL (ref 8.3–10.1)
CARDIAC TROPONIN I PNL SERPL HS: 21 NG/L
CARDIAC TROPONIN I PNL SERPL HS: 24 NG/L
CARDIAC TROPONIN I PNL SERPL HS: 26 NG/L
CHLORIDE SERPL-SCNC: 102 MMOL/L (ref 100–108)
CLARITY UR: CLEAR
CO2 SERPL-SCNC: 19 MMOL/L (ref 21–32)
COLOR UR: YELLOW
CREAT SERPL-MCNC: 2.3 MG/DL (ref 0.6–1.3)
EOSINOPHIL # BLD AUTO: 0.01 THOUSAND/ΜL (ref 0–0.61)
EOSINOPHIL NFR BLD AUTO: 0 % (ref 0–6)
ERYTHROCYTE [DISTWIDTH] IN BLOOD BY AUTOMATED COUNT: 20.6 % (ref 11.6–15.1)
FLUAV RNA RESP QL NAA+PROBE: NEGATIVE
FLUBV RNA RESP QL NAA+PROBE: NEGATIVE
GFR SERPL CREATININE-BSD FRML MDRD: 25 ML/MIN/1.73SQ M
GLUCOSE SERPL-MCNC: 99 MG/DL (ref 65–140)
GLUCOSE UR STRIP-MCNC: NEGATIVE MG/DL
HCT VFR BLD AUTO: 29.4 % (ref 36.5–49.3)
HGB BLD-MCNC: 8.8 G/DL (ref 12–17)
HGB UR QL STRIP.AUTO: ABNORMAL
IMM GRANULOCYTES # BLD AUTO: 0.02 THOUSAND/UL (ref 0–0.2)
IMM GRANULOCYTES NFR BLD AUTO: 1 % (ref 0–2)
KETONES UR STRIP-MCNC: NEGATIVE MG/DL
LEUKOCYTE ESTERASE UR QL STRIP: NEGATIVE
LIPASE SERPL-CCNC: 372 U/L (ref 73–393)
LYMPHOCYTES # BLD AUTO: 0.8 THOUSANDS/ΜL (ref 0.6–4.47)
LYMPHOCYTES NFR BLD AUTO: 19 % (ref 14–44)
MCH RBC QN AUTO: 25.4 PG (ref 26.8–34.3)
MCHC RBC AUTO-ENTMCNC: 29.9 G/DL (ref 31.4–37.4)
MCV RBC AUTO: 85 FL (ref 82–98)
MONOCYTES # BLD AUTO: 1.23 THOUSAND/ΜL (ref 0.17–1.22)
MONOCYTES NFR BLD AUTO: 29 % (ref 4–12)
NEUTROPHILS # BLD AUTO: 2.18 THOUSANDS/ΜL (ref 1.85–7.62)
NEUTS SEG NFR BLD AUTO: 51 % (ref 43–75)
NITRITE UR QL STRIP: NEGATIVE
NON-SQ EPI CELLS URNS QL MICRO: ABNORMAL /HPF
NRBC BLD AUTO-RTO: 0 /100 WBCS
P AXIS: 31 DEGREES
P AXIS: 31 DEGREES
PH UR STRIP.AUTO: 5.5 [PH] (ref 4.5–8)
PLATELET # BLD AUTO: 250 THOUSANDS/UL (ref 149–390)
PMV BLD AUTO: 11.1 FL (ref 8.9–12.7)
POTASSIUM SERPL-SCNC: 3.9 MMOL/L (ref 3.5–5.3)
PR INTERVAL: 142 MS
PR INTERVAL: 142 MS
PROT SERPL-MCNC: 9.1 G/DL (ref 6.4–8.2)
PROT UR STRIP-MCNC: ABNORMAL MG/DL
QRS AXIS: 39 DEGREES
QRS AXIS: 52 DEGREES
QRSD INTERVAL: 80 MS
QRSD INTERVAL: 86 MS
QT INTERVAL: 370 MS
QT INTERVAL: 404 MS
QTC INTERVAL: 413 MS
QTC INTERVAL: 448 MS
RBC # BLD AUTO: 3.47 MILLION/UL (ref 3.88–5.62)
RBC #/AREA URNS AUTO: ABNORMAL /HPF
RSV RNA RESP QL NAA+PROBE: NEGATIVE
SARS-COV-2 RNA RESP QL NAA+PROBE: POSITIVE
SODIUM SERPL-SCNC: 135 MMOL/L (ref 136–145)
SP GR UR STRIP.AUTO: 1.01 (ref 1–1.03)
T WAVE AXIS: 42 DEGREES
T WAVE AXIS: 57 DEGREES
UROBILINOGEN UR QL STRIP.AUTO: 0.2 E.U./DL
VENTRICULAR RATE: 74 BPM
VENTRICULAR RATE: 75 BPM
WBC # BLD AUTO: 4.25 THOUSAND/UL (ref 4.31–10.16)
WBC #/AREA URNS AUTO: ABNORMAL /HPF

## 2022-01-18 PROCEDURE — 93005 ELECTROCARDIOGRAM TRACING: CPT

## 2022-01-18 PROCEDURE — 80053 COMPREHEN METABOLIC PANEL: CPT | Performed by: PHYSICIAN ASSISTANT

## 2022-01-18 PROCEDURE — 84484 ASSAY OF TROPONIN QUANT: CPT | Performed by: PHYSICIAN ASSISTANT

## 2022-01-18 PROCEDURE — 93010 ELECTROCARDIOGRAM REPORT: CPT | Performed by: INTERNAL MEDICINE

## 2022-01-18 PROCEDURE — 96360 HYDRATION IV INFUSION INIT: CPT

## 2022-01-18 PROCEDURE — 96361 HYDRATE IV INFUSION ADD-ON: CPT

## 2022-01-18 PROCEDURE — 74176 CT ABD & PELVIS W/O CONTRAST: CPT

## 2022-01-18 PROCEDURE — 99223 1ST HOSP IP/OBS HIGH 75: CPT | Performed by: INTERNAL MEDICINE

## 2022-01-18 PROCEDURE — 99285 EMERGENCY DEPT VISIT HI MDM: CPT | Performed by: PHYSICIAN ASSISTANT

## 2022-01-18 PROCEDURE — 81001 URINALYSIS AUTO W/SCOPE: CPT

## 2022-01-18 PROCEDURE — 83690 ASSAY OF LIPASE: CPT | Performed by: PHYSICIAN ASSISTANT

## 2022-01-18 PROCEDURE — G1004 CDSM NDSC: HCPCS

## 2022-01-18 PROCEDURE — 71045 X-RAY EXAM CHEST 1 VIEW: CPT

## 2022-01-18 PROCEDURE — 36415 COLL VENOUS BLD VENIPUNCTURE: CPT | Performed by: PHYSICIAN ASSISTANT

## 2022-01-18 PROCEDURE — 99285 EMERGENCY DEPT VISIT HI MDM: CPT

## 2022-01-18 PROCEDURE — 0241U HB NFCT DS VIR RESP RNA 4 TRGT: CPT | Performed by: PHYSICIAN ASSISTANT

## 2022-01-18 PROCEDURE — RECHECK: Performed by: INTERNAL MEDICINE

## 2022-01-18 PROCEDURE — 85025 COMPLETE CBC W/AUTO DIFF WBC: CPT | Performed by: PHYSICIAN ASSISTANT

## 2022-01-18 PROCEDURE — XW033E5 INTRODUCTION OF REMDESIVIR ANTI-INFECTIVE INTO PERIPHERAL VEIN, PERCUTANEOUS APPROACH, NEW TECHNOLOGY GROUP 5: ICD-10-PCS | Performed by: INTERNAL MEDICINE

## 2022-01-18 PROCEDURE — 87086 URINE CULTURE/COLONY COUNT: CPT

## 2022-01-18 RX ORDER — METOPROLOL TARTRATE 100 MG/1
100 TABLET ORAL 2 TIMES DAILY
Status: DISCONTINUED | OUTPATIENT
Start: 2022-01-18 | End: 2022-01-20 | Stop reason: HOSPADM

## 2022-01-18 RX ORDER — MORPHINE SULFATE 15 MG/1
15 TABLET, FILM COATED, EXTENDED RELEASE ORAL EVERY 12 HOURS
Status: DISCONTINUED | OUTPATIENT
Start: 2022-01-18 | End: 2022-01-20 | Stop reason: HOSPADM

## 2022-01-18 RX ORDER — ASPIRIN 81 MG/1
81 TABLET, CHEWABLE ORAL DAILY
Status: DISCONTINUED | OUTPATIENT
Start: 2022-01-18 | End: 2022-01-20 | Stop reason: HOSPADM

## 2022-01-18 RX ORDER — SODIUM CHLORIDE, SODIUM GLUCONATE, SODIUM ACETATE, POTASSIUM CHLORIDE, MAGNESIUM CHLORIDE, SODIUM PHOSPHATE, DIBASIC, AND POTASSIUM PHOSPHATE .53; .5; .37; .037; .03; .012; .00082 G/100ML; G/100ML; G/100ML; G/100ML; G/100ML; G/100ML; G/100ML
100 INJECTION, SOLUTION INTRAVENOUS CONTINUOUS
Status: DISPENSED | OUTPATIENT
Start: 2022-01-18 | End: 2022-01-18

## 2022-01-18 RX ORDER — PANTOPRAZOLE SODIUM 40 MG/1
40 TABLET, DELAYED RELEASE ORAL
Status: DISCONTINUED | OUTPATIENT
Start: 2022-01-19 | End: 2022-01-20 | Stop reason: HOSPADM

## 2022-01-18 RX ADMIN — SODIUM CHLORIDE 1000 ML: 0.9 INJECTION, SOLUTION INTRAVENOUS at 11:34

## 2022-01-18 RX ADMIN — SODIUM CHLORIDE, SODIUM GLUCONATE, SODIUM ACETATE, POTASSIUM CHLORIDE, MAGNESIUM CHLORIDE, SODIUM PHOSPHATE, DIBASIC, AND POTASSIUM PHOSPHATE 100 ML/HR: .53; .5; .37; .037; .03; .012; .00082 INJECTION, SOLUTION INTRAVENOUS at 15:12

## 2022-01-18 RX ADMIN — REMDESIVIR 200 MG: 100 INJECTION, POWDER, LYOPHILIZED, FOR SOLUTION INTRAVENOUS at 16:08

## 2022-01-18 RX ADMIN — ASPIRIN 81 MG CHEWABLE TABLET 81 MG: 81 TABLET CHEWABLE at 15:21

## 2022-01-18 NOTE — ED PROVIDER NOTES
History  Chief Complaint   Patient presents with    Diarrhea     Patient arrived via EMS c/o diarrhea x 3 days  Patient denies n/v       Patient is an 42-year-old male with a past medical history of multiple myeloma, HTN, GERD, paroxysmal AFib, frequent PVCs who presents with 3 days of diarrhea and generalized weakness, decreased appetite  Patient reports multiple episodes of nonbloody diarrhea that started in the last 3 days  He states he has already had 5 episodes today  Patient also reports feeling generally weak and notes decreased appetite  He denies any nausea, vomiting, abdominal pain, pain with bowel movements, melena, hematochezia, dysuria, hematuria, urinary frequency or urgency, known sick contacts, history of abdominal surgeries, recent antibiotics  Patient is currently undergoing treatment for multiple myeloma  Patient reports previous similar episode approximately 1 month ago was admitted to the hospital for dehydration, no other acute cause of diarrhea  He denies any history of C diff  Patient reports occult blood in stool at recent GI appointment and plan for endoscopy/colonoscopy for further evaluation next month  Patient states he is otherwise in his usual state of health and denies any fevers, chills, diaphoresis, headache, dizziness, lightheadedness, neck pain or stiffness, congestion, cough, shortness of breath, chest pain, palpitations  Patient has been vaccinated for COVID, but has not had his 3rd booster  History provided by:  Patient   used: Yes        Prior to Admission Medications   Prescriptions Last Dose Informant Patient Reported? Taking?    Calcium 500-100 MG-UNIT CHEW   Yes Yes   Sig: Chew 1 tablet daily   acyclovir (ZOVIRAX) 400 MG tablet   Yes Yes   Sig: Take 400 mg by mouth 2 (two) times a day   aspirin 81 mg chewable tablet   Yes Yes   Sig: Chew 81 mg daily   metoprolol tartrate (LOPRESSOR) 100 mg tablet   Yes Yes   Sig: Take 100 mg by mouth 2 (two) times a day   morphine (MS CONTIN) 15 mg 12 hr tablet   Yes Yes   Sig: Take 15 mg by mouth every 12 (twelve) hours   omeprazole (PriLOSEC) 40 MG capsule   Yes Yes   Sig: TAKE 1 CAPSULE BY MOUTH DIARIAMENTE      Facility-Administered Medications: None       Past Medical History:   Diagnosis Date    Bone cancer ([facility] [address] )     Hiatal hernia with GERD     History of chemotherapy     Pt goes for treatments monthly and has been on chemo tx for 5 years   Hypertension     Iron deficiency anemia     Multiple myeloma (HCC)     Neutropenia (HCC)     Systolic murmur        History reviewed  No pertinent surgical history  History reviewed  No pertinent family history  I have reviewed and agree with the history as documented  E-Cigarette/Vaping    E-Cigarette Use Never User      E-Cigarette/Vaping Substances    Nicotine No     THC No     CBD No     Flavoring No     Other No     Unknown No      Social History     Tobacco Use    Smoking status: Never Smoker    Smokeless tobacco: Never Used   Vaping Use    Vaping Use: Never used   Substance Use Topics    Alcohol use: Not Currently    Drug use: Never       Review of Systems   Constitutional: Positive for appetite change  Negative for chills, diaphoresis and fever  HENT: Negative for congestion, ear pain, rhinorrhea and sore throat  Eyes: Negative for visual disturbance  Respiratory: Negative for cough, shortness of breath, wheezing and stridor  Cardiovascular: Negative for chest pain and palpitations  Gastrointestinal: Positive for diarrhea  Negative for abdominal pain, nausea and vomiting  Genitourinary: Negative for difficulty urinating, dysuria, frequency, hematuria and urgency  Musculoskeletal: Negative for myalgias, neck pain and neck stiffness  Skin: Negative for color change, pallor and rash  Neurological: Positive for weakness  Negative for dizziness, light-headedness, numbness and headaches     All other systems reviewed and are negative  Physical Exam  Physical Exam  Vitals and nursing note reviewed  Constitutional:       General: He is awake  He is not in acute distress  Appearance: He is well-developed  He is not ill-appearing, toxic-appearing or diaphoretic  Comments: Patient appears well, no acute distress, nontoxic-appearing  HENT:      Head: Normocephalic and atraumatic  Right Ear: External ear normal       Left Ear: External ear normal       Nose: Nose normal    Eyes:      Conjunctiva/sclera: Conjunctivae normal       Pupils: Pupils are equal, round, and reactive to light  Cardiovascular:      Rate and Rhythm: Normal rate and regular rhythm  Pulses: Normal pulses  Heart sounds: Normal heart sounds, S1 normal and S2 normal    Pulmonary:      Effort: Pulmonary effort is normal  No respiratory distress  Breath sounds: Normal breath sounds  No stridor  No decreased breath sounds or wheezing  Abdominal:      General: Bowel sounds are normal  There is no distension  Palpations: Abdomen is soft  Tenderness: There is abdominal tenderness in the right lower quadrant and suprapubic area  There is no right CVA tenderness, left CVA tenderness, guarding or rebound  Musculoskeletal:         General: Normal range of motion  Cervical back: Normal range of motion and neck supple  Right lower leg: No edema  Left lower leg: No edema  Comments: Neurovascularly intact, 5/5 strength in bilateral upper and lower extremities   Lymphadenopathy:      Cervical: No cervical adenopathy  Skin:     General: Skin is warm and dry  Capillary Refill: Capillary refill takes less than 2 seconds  Neurological:      General: No focal deficit present  Mental Status: He is alert and oriented to person, place, and time  GCS: GCS eye subscore is 4  GCS verbal subscore is 5  GCS motor subscore is 6  Psychiatric:         Behavior: Behavior is cooperative           Vital Signs  ED Triage Vitals   Temperature Pulse Respirations Blood Pressure SpO2   01/18/22 1029 01/18/22 1037 01/18/22 1029 01/18/22 1029 01/18/22 1029   97 9 °F (36 6 °C) 82 18 110/69 100 %      Temp Source Heart Rate Source Patient Position - Orthostatic VS BP Location FiO2 (%)   01/18/22 1029 01/18/22 1037 01/18/22 1029 01/18/22 1029 --   Oral Monitor Lying Right arm       Pain Score       01/18/22 1029       No Pain           Vitals:    01/18/22 1037 01/18/22 1343 01/18/22 1433 01/18/22 1454   BP:  161/71 127/72 128/62   Pulse: 82 89 79 (!) 49   Patient Position - Orthostatic VS:  Lying           Visual Acuity      ED Medications  Medications   multi-electrolyte (PLASMALYTE-A/ISOLYTE-S PH 7 4) IV solution (100 mL/hr Intravenous New Bag 1/18/22 1512)   aspirin chewable tablet 81 mg (81 mg Oral Given 1/18/22 1521)   metoprolol tartrate (LOPRESSOR) tablet 100 mg (has no administration in time range)   morphine (MS CONTIN) ER tablet 15 mg (0 mg Oral Hold 1/18/22 1520)   pantoprazole (PROTONIX) EC tablet 40 mg (has no administration in time range)   sodium chloride 0 9 % bolus 1,000 mL (0 mL Intravenous Stopped 1/18/22 1336)       Diagnostic Studies  Results Reviewed     Procedure Component Value Units Date/Time    HS Troponin I 2hr [[id number]] Collected: 01/18/22 1342    Lab Status: Final result Specimen: Blood from Arm, Right Updated: 01/18/22 1416     hs TnI 2hr 24 ng/L      Delta 2hr hsTnI -2 ng/L     Urine Microscopic [[id number]]  (Abnormal) Collected: 01/18/22 1142    Lab Status: Final result Specimen: Urine, Clean Catch Updated: 01/18/22 1241     RBC, UA None Seen /hpf      WBC, UA Innumerable /hpf      Epithelial Cells Occasional /hpf      Bacteria, UA Occasional /hpf     Urine culture [[id number]] Collected: 01/18/22 1142    Lab Status:  In process Specimen: Urine, Clean Catch Updated: 01/18/22 1241    COVID/FLU/RSV - 2 hour TAT [[id number]]  (Abnormal) Collected: 01/18/22 1131    Lab Status: Final result Specimen: Nares from Nasopharyngeal Swab Updated: 01/18/22 1241     SARS-CoV-2 Positive     INFLUENZA A PCR Negative     INFLUENZA B PCR Negative     RSV PCR Negative    Narrative:      FOR PEDIATRIC PATIENTS - copy/paste COVID Guidelines URL to browser: [email or web address]    SARS-CoV-2 assay is a Nucleic Acid Amplification assay intended for the  qualitative detection of nucleic acid from SARS-CoV-2 in nasopharyngeal  swabs  Results are for the presumptive identification of SARS-CoV-2 RNA  Positive results are indicative of infection with SARS-CoV-2, the virus  causing COVID-19, but do not rule out bacterial infection or co-infection  with other viruses  Laboratories within the [place] and its  territories are required to report all positive results to the appropriate  public health authorities  Negative results do not preclude SARS-CoV-2  infection and should not be used as the sole basis for treatment or other  patient management decisions  Negative results must be combined with  clinical observations, patient history, and epidemiological information  This test has not been FDA cleared or approved  This test has been authorized by FDA under an Emergency Use Authorization  (EUA)  This test is only authorized for the duration of time the  declaration that circumstances exist justifying the authorization of the  emergency use of an in vitro diagnostic tests for detection of SARS-CoV-2  virus and/or diagnosis of COVID-19 infection under section 564(b)(1) of  the Act, 21 U  S C  489NBM-5(V)(6), unless the authorization is terminated  or revoked sooner  The test has been validated but independent review by FDA  and CLIA is pending  Test performed using [employer] GeneXpert: This RT-PCR assay targets N2,  a region unique to SARS-CoV-2  A conserved region in the E-gene was chosen  for pan-Sarbecovirus detection which includes SARS-CoV-2      Lipase [[id number]]  (Normal) Collected: 01/18/22 1131    Lab Status: Final result Specimen: Blood from Arm, Right Updated: 01/18/22 1211     Lipase 372 u/L     Comprehensive metabolic panel [[id number]]  (Abnormal) Collected: 01/18/22 1131    Lab Status: Final result Specimen: Blood from Arm, Right Updated: 01/18/22 1211     Sodium 135 mmol/L      Potassium 3 9 mmol/L      Chloride 102 mmol/L      CO2 19 mmol/L      ANION GAP 14 mmol/L      BUN 35 mg/dL      Creatinine 2 30 mg/dL      Glucose 99 mg/dL      Calcium 8 1 mg/dL      Corrected Calcium 9 1 mg/dL      AST 25 U/L      ALT 25 U/L      Alkaline Phosphatase 52 U/L      Total Protein 9 1 g/dL      Albumin 2 7 g/dL      Total Bilirubin 0 24 mg/dL      eGFR 25 ml/min/1 73sq m     Narrative:      National Kidney Disease Foundation guidelines for Chronic Kidney Disease (CKD):     Stage 1 with normal or high GFR (GFR > 90 mL/min/1 73 square meters)    Stage 2 Mild CKD (GFR = 60-89 mL/min/1 73 square meters)    Stage 3A Moderate CKD (GFR = 45-59 mL/min/1 73 square meters)    Stage 3B Moderate CKD (GFR = 30-44 mL/min/1 73 square meters)    Stage 4 Severe CKD (GFR = 15-29 mL/min/1 73 square meters)    Stage 5 End Stage CKD (GFR <15 mL/min/1 73 square meters)  Note: GFR calculation is accurate only with a steady state creatinine    HS Troponin 0hr (reflex protocol) [[id number]]  (Normal) Collected: 01/18/22 1131    Lab Status: Final result Specimen: Blood from Arm, Right Updated: 01/18/22 1205     hs TnI 0hr 26 ng/L     HS Troponin I 4hr [[id number]]     Lab Status: No result Specimen: Blood     Urine Macroscopic, POC [[id number]]  (Abnormal) Collected: 01/18/22 1142    Lab Status: Final result Specimen: Urine Updated: 01/18/22 1143     Color, UA Yellow     Clarity, UA Clear     pH, UA 5 5     Leukocytes, UA Negative     Nitrite, UA Negative     Protein,  (2+) mg/dl      Glucose, UA Negative mg/dl      Ketones, UA Negative mg/dl      Urobilinogen, UA 0 2 E U /dl      Bilirubin, UA Negative Blood, UA Moderate     Specific [place], UA 1 015    Narrative:      CLINITEK RESULT    CBC and differential [[id number]]  (Abnormal) Collected: 01/18/22 1131    Lab Status: Final result Specimen: Blood from Arm, Right Updated: 01/18/22 1141     WBC 4 25 Thousand/uL      RBC 3 47 Million/uL      Hemoglobin 8 8 g/dL      Hematocrit 29 4 %      MCV 85 fL      MCH 25 4 pg      MCHC 29 9 g/dL      RDW 20 6 %      MPV 11 1 fL      Platelets 417 Thousands/uL      nRBC 0 /100 WBCs      Neutrophils Relative 51 %      Immat GRANS % 1 %      Lymphocytes Relative 19 %      Monocytes Relative 29 %      Eosinophils Relative 0 %      Basophils Relative 0 %      Neutrophils Absolute 2 18 Thousands/µL      Immature Grans Absolute 0 02 Thousand/uL      Lymphocytes Absolute 0 80 Thousands/µL      Monocytes Absolute 1 23 Thousand/µL      Eosinophils Absolute 0 01 Thousand/µL      Basophils Absolute 0 01 Thousands/µL     POCT occult blood stool [[id number]]     Lab Status: No result Specimen: Stool                  CT abdomen pelvis wo contrast   Final Result by Rogerio Rinne, MD (01/18 0409)      Moderate hiatal hernia  Large right inguinal hernia containing nonobstructed loops of small bowel  No definite acute osseous abnormality  Age-indeterminate mild superior endplate compression fractures at T12 and L4  Workstation performed: [id number]         XR chest 1 view portable   Final Result by Denver Rodriguez MD (01/18 4124)      No acute cardiopulmonary disease                    Workstation performed: [id number]                    Procedures  ECG 12 Lead Documentation Only    Date/Time: 1/18/2022 11:30 AM  Performed by: Theresa Rivera PA-C  Authorized by: Theresa Rivera PA-C     Indications / Diagnosis:  Weakness  ECG reviewed by me, the ED Provider: yes    Patient location:  ED  Previous ECG:     Previous ECG:  Compared to current    Comparison ECG info:  [id number]  Rate:     ECG rate:  74    ECG rate assessment: bradycardic    Rhythm:     Rhythm: sinus rhythm    Ectopy:     Ectopy: PVCs      PVCs:  Frequent  QRS:     QRS axis:  Normal  Conduction:     Conduction: normal    ST segments:     ST segments:  Normal  T waves:     T waves: non-specific      ECG 12 Lead Documentation Only    Date/Time: 1/18/2022 1:50 PM  Performed by: Rickey Cobian PA-C  Authorized by: Rickey Cobian PA-C     Indications / Diagnosis:  Weakness  ECG reviewed by me, the ED Provider: yes    Patient location:  ED  Previous ECG:     Previous ECG:  Compared to current    Comparison ECG info:  Earlier today    Comparison to previous ECG: new PACs  Rate:     ECG rate:  75    ECG rate assessment: normal    Rhythm:     Rhythm: sinus rhythm    Ectopy:     Ectopy: PAC and PVCs      PVCs:  Infrequent  QRS:     QRS axis:  Normal  Conduction:     Conduction: normal    ST segments:     ST segments:  Non-specific  T waves:     T waves: non-specific               ED Course  ED Course as of 01/18/22 1525   Tue Jan 18, 2022   1159 WBC(!): 4 25  Yesterday 5 6 from [facility]   1200 Hemoglobin(!): 8 8  Yesterday 8 6 from [facility]   1208 hs TnI 0hr: 26   1215 Creatinine(!): 2 30  1 3 yesterday, MELANIE   1246 SARS-COV-2(!): Positive   1300 Reviewed all results with patient, answered questions utilizing   Patient continues to deny any abdominal pain, chest pain, shortness of breath  Patient is agreeable to plan for admission  1340 XR chest 1 view portable  IMPRESSION:     No acute cardiopulmonary disease   1348 Ambulatory pulse ox 88% and symptomatic, quickly recovers with rest    1351 CT abdomen pelvis wo contrast  IMPRESSION:     Moderate hiatal hernia      Large right inguinal hernia containing nonobstructed loops of small bowel      No definite acute osseous abnormality    Age-indeterminate mild superior endplate compression fractures at T12 and L4    1438 Delta 2hr hsTnI: -2   1438 hs TnI 2hr: 24                               SBIRT 22yo+      Most Recent Value   SBIRT (24 yo +)    In order to provide better care to our patients, we are screening all of our patients for alcohol and drug use  Would it be okay to ask you these screening questions? No Filed at: 01/18/2022 1035                    MDM    Disposition  Final diagnoses:   COVID-19 virus infection   MELANIE (acute kidney injury) ([address] )     Time reflects when diagnosis was documented in both MDM as applicable and the Disposition within this note     Time User Action Codes Description Comment    1/18/2022  1:16 PM Rachel Malone Add [U07 1] COVID-19 virus infection     1/18/2022  1:16 PM [place], [address] [[address]17 9] MELANIE (acute kidney injury) [facility])       ED Disposition     ED Disposition Condition Date/Time Comment    Admit Stable Tue Jan 18, 2022  2:07 PM Case was discussed with Dr Alecia Post and the patient's admission status was agreed to be Admission Status: inpatient status to the service of Dr Alecia Post  Follow-up Information    None         Current Discharge Medication List      CONTINUE these medications which have NOT CHANGED    Details   acyclovir (ZOVIRAX) 400 MG tablet Take 400 mg by mouth 2 (two) times a day      aspirin 81 mg chewable tablet Chew 81 mg daily      Calcium 500-100 MG-UNIT CHEW Chew 1 tablet daily      metoprolol tartrate (LOPRESSOR) 100 mg tablet Take 100 mg by mouth 2 (two) times a day      morphine (MS CONTIN) 15 mg 12 hr tablet Take 15 mg by mouth every 12 (twelve) hours      omeprazole (PriLOSEC) 40 MG capsule TAKE 1 CAPSULE BY MOUTH DIARIAMENTE             No discharge procedures on file      PDMP Review     None          ED Provider  Electronically Signed by           Shantia Ragsdale PA-C  01/18/22 6657

## 2022-01-18 NOTE — ED NOTES
Patient transported to [address] [place] [address] [place], [address] [facility]  01/18/22 1224

## 2022-01-18 NOTE — PLAN OF CARE
Problem: Potential for Falls  Goal: Patient will remain free of falls  Description: INTERVENTIONS:  - Educate patient/family on patient safety including physical limitations  - Instruct patient to call for assistance with activity   - Consult OT/PT to assist with strengthening/mobility   - Keep Call bell within reach  - Keep bed low and locked with side rails adjusted as appropriate  - Keep care items and personal belongings within reach  - Initiate and maintain comfort rounds  - Make Fall Risk Sign visible to staff  - Offer Toileting every  Hours, in advance of need  - Initiate/Maintain alarm  - Obtain necessary fall risk management equipment:   - Apply yellow socks and bracelet for high fall risk patients  - Consider moving patient to room near nurses station  Outcome: Progressing     Problem: Potential for Falls  Goal: Patient will remain free of falls  Description: INTERVENTIONS:  - Educate patient/family on patient safety including physical limitations  - Instruct patient to call for assistance with activity   - Consult OT/PT to assist with strengthening/mobility   - Keep Call bell within reach  - Keep bed low and locked with side rails adjusted as appropriate  - Keep care items and personal belongings within reach  - Initiate and maintain comfort rounds  - Make Fall Risk Sign visible to staff  - Offer Toileting every  Hours, in advance of need  - Initiate/Maintain alarm  - Obtain necessary fall risk management equipment:   - Apply yellow socks and bracelet for high fall risk patients  - Consider moving patient to room near nurses station  1/18/2022 1526 by Ming Ortiz RN  Outcome: Progressing  1/18/2022 1526 by Ming Ortiz RN  Outcome: Progressing  1/18/2022 1525 by Ming Ortiz RN  Outcome: Progressing     Problem: PAIN - ADULT  Goal: Verbalizes/displays adequate comfort level or baseline comfort level  Description: Interventions:  - Encourage patient to monitor pain and request assistance  - Assess pain using appropriate pain scale  - Administer analgesics based on type and severity of pain and evaluate response  - Implement non-pharmacological measures as appropriate and evaluate response  - Consider cultural and social influences on pain and pain management  - Notify physician/advanced practitioner if interventions unsuccessful or patient reports new pain  1/18/2022 1526 by Lesli Mckeon RN  Outcome: Progressing  1/18/2022 1526 by Lesli Mckeon RN  Outcome: Progressing     Problem: INFECTION - ADULT  Goal: Absence or prevention of progression during hospitalization  Description: INTERVENTIONS:  - Assess and monitor for signs and symptoms of infection  - Monitor lab/diagnostic results  - Monitor all insertion sites, i e  indwelling lines, tubes, and drains  - Monitor endotracheal if appropriate and nasal secretions for changes in amount and color  - [place] appropriate cooling/warming therapies per order  - Administer medications as ordered  - Instruct and encourage patient and family to use good hand hygiene technique  - Identify and instruct in appropriate isolation precautions for identified infection/condition  1/18/2022 1526 by Lesli Mckeon RN  Outcome: Progressing  1/18/2022 1526 by Lesli Mckeon RN  Outcome: Progressing     Problem: SAFETY ADULT  Goal: Patient will remain free of falls  Description: INTERVENTIONS:  - Educate patient/family on patient safety including physical limitations  - Instruct patient to call for assistance with activity   - Consult OT/PT to assist with strengthening/mobility   - Keep Call bell within reach  - Keep bed low and locked with side rails adjusted as appropriate  - Keep care items and personal belongings within reach  - Initiate and maintain comfort rounds  - Make Fall Risk Sign visible to staff  - Offer Toileting every  Hours, in advance of need  - Initiate/Maintain alarm  - Obtain necessary fall risk management equipment:   - Apply yellow socks and bracelet for high fall risk patients  - Consider moving patient to room near nurses station  1/18/2022 1526 by Hany Chong RN  Outcome: Progressing  1/18/2022 1526 by Hany Chong RN  Outcome: Progressing  1/18/2022 1525 by Hany Chong RN  Outcome: Progressing  Goal: Maintain or return to baseline ADL function  Description: INTERVENTIONS:  - Educate patient/family on patient safety including physical limitations  - Instruct patient to call for assistance with activity   - Consult OT/PT to assist with strengthening/mobility   - Keep Call bell within reach  - Keep bed low and locked with side rails adjusted as appropriate  - Keep care items and personal belongings within reach  - Initiate and maintain comfort rounds  - Make Fall Risk Sign visible to staff  - Offer Toileting ever Hours, in advance of need  - Initiate/Maintain alarm  - Obtain necessary fall risk management equipment:   - Apply yellow socks and bracelet for high fall risk patients  - Consider moving patient to room near nurses station  1/18/2022 1526 by Hany Chong RN  Outcome: Progressing  1/18/2022 1526 by Hany Chong RN  Outcome: Progressing  Goal: Maintains/Returns to pre admission functional level  Description: INTERVENTIONS:  -  Assess patient's ability to carry out ADLs; assess patient's baseline for ADL function and identify physical deficits which impact ability to perform ADLs (bathing, care of mouth/teeth, toileting, grooming, dressing, etc )  - Assess/evaluate cause of self-care deficits   - Assess range of motion  - Assess patient's mobility; develop plan if impaired  - Assess patient's need for assistive devices and provide as appropriate  - Encourage maximum independence but intervene and supervise when necessary  - Involve family in performance of ADLs  - Assess for home care needs following discharge   - Consider OT consult to assist with ADL evaluation and planning for discharge  - Provide patient education as appropriate  1/18/2022 1526 by Thiago Martinez RN  Outcome: Progressing  1/18/2022 1526 by Thiago Martinez RN  Outcome: Progressing

## 2022-01-18 NOTE — H&P
[facility]  History and physical - [facility] 1940, [de-identified] y o  male MRN: [id number]  Unit/Bed#: [facility] [facility] [address] [id number] Encounter: [id number]  Primary Care Provider: No primary care provider on file  Date and time admitted to hospital: 1/18/2022 10:22 AM      Assessment and Plan  * COVID-19  Assessment & Plan  Patient presenting with mild COVID-19 infection  No oxygen requirement  Vaccination status, Vaccinated x 2, was due for 3rd vaccination booster today  Vaccination Date 04/14/2021- last vaccine  Oxygen requirements none  Remdesvir Day #1/5   Trend CRP, Prone as possible, OOB TID  Trend CMP,CBC daily  Chest x-ray clear- with no evidence of pneumonia  Mostly has GI symptoms related to COVID-19        Paroxysmal atrial fibrillation (HCC)  Assessment & Plan  Ventricular rate control  No longer on Eliquis, no anticoagulation PTA      Multiple myeloma ([facility] [place] [address] )  Assessment & Plan  Followed by [facility] Oncology/hematology  Diagnosed in 2014  - has received multiple treatments with multiple agents  , has been on maintenance cycle since January 2020 to present  Current therapy is Ixazomib 4 mg weekly starting 10/5/2021    Hiatal hernia with GERD  Assessment & Plan  Continue PPI therapy    Essential hypertension  Assessment & Plan  Continue home medications with hold parameters        Code Status:  Full code    VTE Prophylaxis: Heparin  / sequential compression device     POLST: There is no POLST form on file for this patient (pre-hospital)  Discussion with family:  Discussed with patient daughter    Anticipated Length of Stay:  Patient will be admitted on an Inpatient basis with an anticipated length of stay of  greater than 2 midnights  Justification for Hospital Stay: COVID-19     Total Time for Visit, including Counseling / Coordination of Care: 45 minutes  Greater than 50% of this total time spent on direct patient counseling and coordination of care      Chief Complaint:     Chief Complaint   Patient presents with    Diarrhea     Patient arrived via EMS c/o diarrhea x 3 days  Patient denies n/v         History of Present Illness:    Mely Ha is a [de-identified] y o  male who presents with diarrhea for 3 days in the setting of recently diagnosed COVID  He reports eating poorly, did not take his medications today  He has a history of multiple myeloma, followed by [facility] Hematology  He is currently on treatment once weekly  The time examination he denies any nausea or vomiting, he denies any abdominal pain  He had complained of weakness  He is vaccinated x2, and reports today he was on his way to get his booster vaccination  Discussed with family members, who will report having fever and malaise  They believe they have COVID-19 as well    Review of Systems:    A complete and comprehensive 14 point organ system review was performed and all other systems are negative other than stated above in the HPI    Past Medical and Surgical History:     Past Medical History:   Diagnosis Date    Bone cancer ([facility] [address] )     Hiatal hernia with GERD     History of chemotherapy     Pt goes for treatments monthly and has been on chemo tx for 5 years   Hypertension     Iron deficiency anemia     Multiple myeloma (HCC)     Neutropenia (HCC)     Systolic murmur        History reviewed  No pertinent surgical history  Meds/Allergies:    Prior to Admission medications    Medication Sig Start Date End Date Taking?  Authorizing Provider   acyclovir (ZOVIRAX) 400 MG tablet Take 400 mg by mouth 2 (two) times a day 2/8/21  Yes Historical Provider, MD   aspirin 81 mg chewable tablet Chew 81 mg daily   Yes Historical Provider, MD   Calcium 500-100 MG-UNIT CHEW Chew 1 tablet daily   Yes Historical Provider, MD   metoprolol tartrate (LOPRESSOR) 100 mg tablet Take 100 mg by mouth 2 (two) times a day 4/22/21 4/22/22 Yes Historical Provider, MD   morphine (MS CONTIN) 15 mg 12 hr tablet Take 15 mg by mouth every 12 (twelve) hours 4/27/21  Yes Historical Provider, MD   omeprazole (PriLOSEC) 40 MG capsule TAKE 1 CAPSULE BY MOUTH Vinnie Stratton 3/25/21  Yes Historical Provider, MD   apixaban (ELIQUIS) 5 mg Take 5 mg by mouth 2 (two) times a day 3/19/21 1/18/22  Historical Provider, MD     I have reviewed home medications with patient personally  Allergies: No Known Allergies    Social History:     Marital Status: /Civil Union   Occupation:  Retired    Substance Use History:   Social History     Substance and Sexual Activity   Alcohol Use Not Currently     Social History     Tobacco Use   Smoking Status Never Smoker   Smokeless Tobacco Never Used     Social History     Substance and Sexual Activity   Drug Use Never       Family History:    History reviewed  No pertinent family history  Physical Exam:     Vitals:   Blood Pressure: 128/62 (01/18/22 1454)  Pulse: (!) 49 (01/18/22 1454)  Temperature: 98 °F (36 7 °C) (01/18/22 1454)  Temp Source: Oral (01/18/22 1029)  Respirations: 14 (01/18/22 1454)  SpO2: 100 % (01/18/22 1454)      General: well appearing, no acute distress  HEENT: atraumatic, PERRLA, moist mucosa, normal pharynx, normal tonsils and adenoids, normal tongue, no fluid in sinuses  Neck: Trachea midline, no carotid bruit, no masses  Respiratory: normal chest wall expansion, CTA B, no r/r/w, no rubs  Cardiovascular: RRR, no m/r/g, Normal S1 and S2  Abdomen: Soft, non-tender, non-distended, normal bowel sounds in all quadrants, no hepatosplenomegaly, no tympany  Rectal: deferred  Musculoskeletal: normal ROM in upper and lower extremities  Integumentary: warm, dry, and pink, with no rash, purpura, or petechia  Heme/Lymph: no lymphadenopathy, no bruises  Neurological: Cranial Nerves II-XII grossly intact  Psychiatric: cooperative with normal mood, affect, and cognition    Additional Data:     Lab Results: I have personally reviewed pertinent reports        Results from last 7 days   Lab Units 01/18/22  1131   WBC Thousand/uL 4 25*   HEMOGLOBIN g/dL 8 8*   HEMATOCRIT % 29 4*   PLATELETS Thousands/uL 250   NEUTROS PCT % 51   LYMPHS PCT % 19   MONOS PCT % 29*   EOS PCT % 0     Results from last 7 days   Lab Units 01/18/22  1131   SODIUM mmol/L 135*   POTASSIUM mmol/L 3 9   CHLORIDE mmol/L 102   CO2 mmol/L 19*   BUN mg/dL 35*   CREATININE mg/dL 2 30*   ANION GAP mmol/L 14*   CALCIUM mg/dL 8 1*   ALBUMIN g/dL 2 7*   TOTAL BILIRUBIN mg/dL 0 24   ALK PHOS U/L 52   ALT U/L 25   AST U/L 25   GLUCOSE RANDOM mg/dL 99                     Results from last 7 days   Lab Units 01/18/22  1342 01/18/22  1131   HS TNI 0HR ng/L  --  26   HS TNI 2HR ng/L 24  --        Imaging: I have personally reviewed pertinent reports  CT abdomen pelvis wo contrast   Final Result by Arun Tuttle MD (01/18 5894)      Moderate hiatal hernia  Large right inguinal hernia containing nonobstructed loops of small bowel  No definite acute osseous abnormality  Age-indeterminate mild superior endplate compression fractures at T12 and L4  Workstation performed: [id number]         XR chest 1 view portable   Final Result by Cody Emmanuel MD (01/18 9238)      No acute cardiopulmonary disease  Workstation performed: [id number]             EKG, Pathology, and Other Studies Reviewed on Admission:   · Review chest x-ray, no acute disease of the chest    Allscripts / Epic Records Reviewed: Yes     ** Please Note: This note was completed in part utilizing M-Modal Fluency Direct Software  Grammatical errors, random word insertions, spelling mistakes, and incomplete sentences may be an occasional consequence of this system secondary to software limitations, ambient noise, and hardware issues  If you have any questions or concerns about the content, text, or information contained within the body of this dictation, please contact the provider for clarification  **

## 2022-01-18 NOTE — H&P
[address] [facility]  History and physical - Festus Christian 1940, [de-identified] y o  male MRN: [id number]  Unit/Bed#: [address] [id number] Encounter: [id number]  Primary Care Provider: No primary care provider on file  Date and time admitted to hospital: 1/18/2022 10:22 AM        Assessment and Plan  * COVID-19  Assessment & Plan  Patient presenting with mild COVID-19 infection  No oxygen requirement  Vaccination status, Vaccinated x 2, was due for 3rd vaccination booster today  Vaccination Date 04/14/2021- last vaccine  Oxygen requirements none  Remdesvir Day #1/5   Trend CRP, Prone as possible, OOB TID  Trend CMP,CBC daily  Chest x-ray clear- with no evidence of pneumonia  Mostly has GI symptoms related to COVID-19           Paroxysmal atrial fibrillation (HCC)  Assessment & Plan  Ventricular rate control  No longer on Eliquis, no anticoagulation PTA        Multiple myeloma ([facility] [address] )  Assessment & Plan  Followed by [facility] Oncology/hematology  Diagnosed in 2014  - has received multiple treatments with multiple agents  , has been on maintenance cycle since January 2020 to present  Current therapy is Ixazomib 4 mg weekly starting 10/5/2021     Hiatal hernia with GERD  Assessment & Plan  Continue PPI therapy     Essential hypertension  Assessment & Plan  Continue home medications with hold parameters           Code Status:  Full code     VTE Prophylaxis: Heparin  / sequential compression device      POLST: There is no POLST form on file for this patient (pre-hospital)  Discussion with family:  Discussed with patient daughter     Anticipated Length of Stay:  Patient will be admitted on an Inpatient basis with an anticipated length of stay of  greater than 2 midnights  Justification for Hospital Stay: COVID-19      Total Time for Visit, including Counseling / Coordination of Care: 45 minutes    Greater than 50% of this total time spent on direct patient counseling and coordination of care      Chief Complaint:          Chief Complaint   Patient presents with    Diarrhea       Patient arrived via EMS c/o diarrhea x 3 days  Patient denies n/v           History of Present Illness:     Yaa Rico is a [de-identified] y o  male who presents with diarrhea for 3 days in the setting of recently diagnosed COVID  He reports eating poorly, did not take his medications today  He has a history of multiple myeloma, followed by [facility] Hematology  He is currently on treatment once weekly  The time examination he denies any nausea or vomiting, he denies any abdominal pain  He had complained of weakness  He is vaccinated x2, and reports today he was on his way to get his booster vaccination      Discussed with family members, who will report having fever and malaise  They believe they have COVID-19 as well     Review of Systems:     A complete and comprehensive 14 point organ system review was performed and all other systems are negative other than stated above in the HPI     Past Medical and Surgical History:      Medical History        Past Medical History:   Diagnosis Date    Bone cancer ([facility] [address] )      Hiatal hernia with GERD      History of chemotherapy       Pt goes for treatments monthly and has been on chemo tx for 5 years   Hypertension      Iron deficiency anemia      Multiple myeloma (HCC)      Neutropenia (HCC)      Systolic murmur              Surgical History   History reviewed  No pertinent surgical history         Meds/Allergies:             Prior to Admission medications    Medication Sig Start Date End Date Taking?  Authorizing Provider   acyclovir (ZOVIRAX) 400 MG tablet Take 400 mg by mouth 2 (two) times a day 2/8/21   Yes Historical Provider, MD   aspirin 81 mg chewable tablet Chew 81 mg daily     Yes Historical Provider, MD   Calcium 500-100 MG-UNIT CHEW Chew 1 tablet daily     Yes Historical Provider, MD   metoprolol tartrate (LOPRESSOR) 100 mg tablet Take 100 mg by mouth 2 (two) times a day 4/22/21 4/22/22 Yes Historical Provider, MD   morphine (MS CONTIN) 15 mg 12 hr tablet Take 15 mg by mouth every 12 (twelve) hours 4/27/21   Yes Historical Provider, MD   omeprazole (PriLOSEC) 40 MG capsule TAKE 1 CAPSULE BY MOUTH Martin Serafin 3/25/21   Yes Historical Provider, MD   apixaban (ELIQUIS) 5 mg Take 5 mg by mouth 2 (two) times a day 3/19/21 1/18/22   Historical Provider, MD      I have reviewed home medications with patient personally      Allergies: No Known Allergies     Social History:     Marital Status: /Civil Union   Occupation:  Retired     Substance Use History:   Social History          Substance and Sexual Activity   Alcohol Use Not Currently      Social History          Tobacco Use   Smoking Status Never Smoker   Smokeless Tobacco Never Used      Social History          Substance and Sexual Activity   Drug Use Never         Family History:     History reviewed   No pertinent family history      Physical Exam:      Vitals:   Blood Pressure: 128/62 (01/18/22 1454)  Pulse: (!) 49 (01/18/22 1454)  Temperature: 98 °F (36 7 °C) (01/18/22 1454)  Temp Source: Oral (01/18/22 1029)  Respirations: 14 (01/18/22 1454)  SpO2: 100 % (01/18/22 1454)        General: well appearing, no acute distress  HEENT: atraumatic, PERRLA, moist mucosa, normal pharynx, normal tonsils and adenoids, normal tongue, no fluid in sinuses  Neck: Trachea midline, no carotid bruit, no masses  Respiratory: normal chest wall expansion, CTA B, no r/r/w, no rubs  Cardiovascular: RRR, no m/r/g, Normal S1 and S2  Abdomen: Soft, non-tender, non-distended, normal bowel sounds in all quadrants, no hepatosplenomegaly, no tympany  Rectal: deferred  Musculoskeletal: normal ROM in upper and lower extremities  Integumentary: warm, dry, and pink, with no rash, purpura, or petechia  Heme/Lymph: no lymphadenopathy, no bruises  Neurological: Cranial Nerves II-XII grossly intact  Psychiatric: cooperative with normal mood, affect, and cognition     Additional Data:      Lab Results: I have personally reviewed pertinent reports             Results from last 7 days   Lab Units 01/18/22  1131   WBC Thousand/uL 4 25*   HEMOGLOBIN g/dL 8 8*   HEMATOCRIT % 29 4*   PLATELETS Thousands/uL 250   NEUTROS PCT % 51   LYMPHS PCT % 19   MONOS PCT % 29*   EOS PCT % 0           Results from last 7 days   Lab Units 01/18/22  1131   SODIUM mmol/L 135*   POTASSIUM mmol/L 3 9   CHLORIDE mmol/L 102   CO2 mmol/L 19*   BUN mg/dL 35*   CREATININE mg/dL 2 30*   ANION GAP mmol/L 14*   CALCIUM mg/dL 8 1*   ALBUMIN g/dL 2 7*   TOTAL BILIRUBIN mg/dL 0 24   ALK PHOS U/L 52   ALT U/L 25   AST U/L 25   GLUCOSE RANDOM mg/dL 99                            Results from last 7 days   Lab Units 01/18/22  1342 01/18/22  1131   HS TNI 0HR ng/L  --  26   HS TNI 2HR ng/L 24  --          Imaging: I have personally reviewed pertinent reports        CT abdomen pelvis wo contrast   Final Result by Alea Sanabria MD (01/18 1342)       Moderate hiatal hernia        Large right inguinal hernia containing nonobstructed loops of small bowel        No definite acute osseous abnormality  Age-indeterminate mild superior endplate compression fractures at T12 and L4        Workstation performed: [id number]           XR chest 1 view portable   Final Result by Mary Kate Wilson MD (01/18 1336)       No acute cardiopulmonary disease                        Workstation performed: [id number]                 EKG, Pathology, and Other Studies Reviewed on Admission:   · Review chest x-ray, no acute disease of the chest     Allscripts / Epic Records Reviewed:  Yes      ** Please Note: This note was completed in part utilizing [employer] Direct Software   Grammatical errors, random word insertions, spelling mistakes, and incomplete sentences may be an occasional consequence of this system secondary to software limitations, ambient noise, and hardware issues   If you have any questions or concerns about the content, text, or information contained within the body of this dictation, please contact the provider for clarification  **

## 2022-01-18 NOTE — ASSESSMENT & PLAN NOTE
Followed by [facility] Oncology/hematology  Diagnosed in 2014  - has received multiple treatments with multiple agents  , has been on maintenance cycle since January 2020 to present  Current therapy is Ixazomib 4 mg weekly starting 10/5/2021

## 2022-01-18 NOTE — ASSESSMENT & PLAN NOTE
Patient presenting with mild COVID-19 infection  No oxygen requirement  Vaccination status, Vaccinated x 2, was due for 3rd vaccination booster today  Vaccination Date 04/14/2021- last vaccine  Oxygen requirements none  Remdesvir Day #1/5   Trend CRP, Prone as possible, OOB TID  Trend CMP,CBC daily    Chest x-ray clear- with no evidence of pneumonia  Mostly has GI symptoms related to COVID-19

## 2022-01-19 PROBLEM — D64.9 ANEMIA: Status: ACTIVE | Noted: 2022-01-19

## 2022-01-19 LAB
ALBUMIN SERPL BCP-MCNC: 2.5 G/DL (ref 3.5–5)
ALP SERPL-CCNC: 50 U/L (ref 46–116)
ALT SERPL W P-5'-P-CCNC: 22 U/L (ref 12–78)
ANION GAP SERPL CALCULATED.3IONS-SCNC: 13 MMOL/L (ref 4–13)
AST SERPL W P-5'-P-CCNC: 24 U/L (ref 5–45)
BACTERIA UR CULT: NORMAL
BASOPHILS # BLD AUTO: 0 THOUSANDS/ΜL (ref 0–0.1)
BASOPHILS NFR BLD AUTO: 0 % (ref 0–1)
BILIRUB SERPL-MCNC: 0.18 MG/DL (ref 0.2–1)
BUN SERPL-MCNC: 30 MG/DL (ref 5–25)
CALCIUM ALBUM COR SERPL-MCNC: 8.9 MG/DL (ref 8.3–10.1)
CALCIUM SERPL-MCNC: 7.7 MG/DL (ref 8.3–10.1)
CHLORIDE SERPL-SCNC: 105 MMOL/L (ref 100–108)
CO2 SERPL-SCNC: 16 MMOL/L (ref 21–32)
CREAT SERPL-MCNC: 1.65 MG/DL (ref 0.6–1.3)
CRP SERPL QL: 19.1 MG/L
EOSINOPHIL # BLD AUTO: 0 THOUSAND/ΜL (ref 0–0.61)
EOSINOPHIL NFR BLD AUTO: 0 % (ref 0–6)
ERYTHROCYTE [DISTWIDTH] IN BLOOD BY AUTOMATED COUNT: 20.6 % (ref 11.6–15.1)
GFR SERPL CREATININE-BSD FRML MDRD: 38 ML/MIN/1.73SQ M
GLUCOSE SERPL-MCNC: 88 MG/DL (ref 65–140)
HCT VFR BLD AUTO: 26.4 % (ref 36.5–49.3)
HGB BLD-MCNC: 8.1 G/DL (ref 12–17)
IMM GRANULOCYTES # BLD AUTO: 0.01 THOUSAND/UL (ref 0–0.2)
IMM GRANULOCYTES NFR BLD AUTO: 0 % (ref 0–2)
LYMPHOCYTES # BLD AUTO: 0.57 THOUSANDS/ΜL (ref 0.6–4.47)
LYMPHOCYTES NFR BLD AUTO: 14 % (ref 14–44)
MCH RBC QN AUTO: 25.6 PG (ref 26.8–34.3)
MCHC RBC AUTO-ENTMCNC: 30.7 G/DL (ref 31.4–37.4)
MCV RBC AUTO: 84 FL (ref 82–98)
MONOCYTES # BLD AUTO: 1.35 THOUSAND/ΜL (ref 0.17–1.22)
MONOCYTES NFR BLD AUTO: 32 % (ref 4–12)
NEUTROPHILS # BLD AUTO: 2.26 THOUSANDS/ΜL (ref 1.85–7.62)
NEUTS SEG NFR BLD AUTO: 54 % (ref 43–75)
NRBC BLD AUTO-RTO: 0 /100 WBCS
PLATELET # BLD AUTO: 213 THOUSANDS/UL (ref 149–390)
PMV BLD AUTO: 11.4 FL (ref 8.9–12.7)
POTASSIUM SERPL-SCNC: 3.9 MMOL/L (ref 3.5–5.3)
PROT SERPL-MCNC: 8.2 G/DL (ref 6.4–8.2)
RBC # BLD AUTO: 3.16 MILLION/UL (ref 3.88–5.62)
SODIUM SERPL-SCNC: 134 MMOL/L (ref 136–145)
WBC # BLD AUTO: 4.19 THOUSAND/UL (ref 4.31–10.16)

## 2022-01-19 PROCEDURE — 80053 COMPREHEN METABOLIC PANEL: CPT | Performed by: INTERNAL MEDICINE

## 2022-01-19 PROCEDURE — 86140 C-REACTIVE PROTEIN: CPT | Performed by: INTERNAL MEDICINE

## 2022-01-19 PROCEDURE — 85025 COMPLETE CBC W/AUTO DIFF WBC: CPT | Performed by: INTERNAL MEDICINE

## 2022-01-19 PROCEDURE — 99232 SBSQ HOSP IP/OBS MODERATE 35: CPT | Performed by: INTERNAL MEDICINE

## 2022-01-19 RX ORDER — ACETAMINOPHEN 325 MG/1
650 TABLET ORAL EVERY 6 HOURS PRN
Status: DISCONTINUED | OUTPATIENT
Start: 2022-01-19 | End: 2022-01-20 | Stop reason: HOSPADM

## 2022-01-19 RX ORDER — MAGNESIUM HYDROXIDE/ALUMINUM HYDROXICE/SIMETHICONE 120; 1200; 1200 MG/30ML; MG/30ML; MG/30ML
30 SUSPENSION ORAL EVERY 6 HOURS PRN
Status: DISCONTINUED | OUTPATIENT
Start: 2022-01-19 | End: 2022-01-20 | Stop reason: HOSPADM

## 2022-01-19 RX ORDER — CALCIUM CARBONATE 200(500)MG
1000 TABLET,CHEWABLE ORAL DAILY PRN
Status: DISCONTINUED | OUTPATIENT
Start: 2022-01-19 | End: 2022-01-20 | Stop reason: HOSPADM

## 2022-01-19 RX ORDER — SIMETHICONE 80 MG
80 TABLET,CHEWABLE ORAL 4 TIMES DAILY PRN
Status: DISCONTINUED | OUTPATIENT
Start: 2022-01-19 | End: 2022-01-20 | Stop reason: HOSPADM

## 2022-01-19 RX ORDER — HEPARIN SODIUM 5000 [USP'U]/ML
5000 INJECTION, SOLUTION INTRAVENOUS; SUBCUTANEOUS EVERY 8 HOURS SCHEDULED
Status: DISCONTINUED | OUTPATIENT
Start: 2022-01-19 | End: 2022-01-20 | Stop reason: HOSPADM

## 2022-01-19 RX ORDER — ONDANSETRON 2 MG/ML
4 INJECTION INTRAMUSCULAR; INTRAVENOUS EVERY 6 HOURS PRN
Status: DISCONTINUED | OUTPATIENT
Start: 2022-01-19 | End: 2022-01-20 | Stop reason: HOSPADM

## 2022-01-19 RX ORDER — SODIUM CHLORIDE, SODIUM GLUCONATE, SODIUM ACETATE, POTASSIUM CHLORIDE, MAGNESIUM CHLORIDE, SODIUM PHOSPHATE, DIBASIC, AND POTASSIUM PHOSPHATE .53; .5; .37; .037; .03; .012; .00082 G/100ML; G/100ML; G/100ML; G/100ML; G/100ML; G/100ML; G/100ML
500 INJECTION, SOLUTION INTRAVENOUS ONCE
Status: COMPLETED | OUTPATIENT
Start: 2022-01-19 | End: 2022-01-19

## 2022-01-19 RX ADMIN — ACETAMINOPHEN 650 MG: 325 TABLET, FILM COATED ORAL at 07:06

## 2022-01-19 RX ADMIN — SODIUM CHLORIDE, SODIUM GLUCONATE, SODIUM ACETATE, POTASSIUM CHLORIDE, MAGNESIUM CHLORIDE, SODIUM PHOSPHATE, DIBASIC, AND POTASSIUM PHOSPHATE 500 ML: .53; .5; .37; .037; .03; .012; .00082 INJECTION, SOLUTION INTRAVENOUS at 08:55

## 2022-01-19 RX ADMIN — ASPIRIN 81 MG CHEWABLE TABLET 81 MG: 81 TABLET CHEWABLE at 07:06

## 2022-01-19 RX ADMIN — MORPHINE SULFATE 15 MG: 15 TABLET, EXTENDED RELEASE ORAL at 04:25

## 2022-01-19 RX ADMIN — METOPROLOL TARTRATE 100 MG: 100 TABLET, FILM COATED ORAL at 16:00

## 2022-01-19 RX ADMIN — MORPHINE SULFATE 15 MG: 15 TABLET, EXTENDED RELEASE ORAL at 16:00

## 2022-01-19 RX ADMIN — HEPARIN SODIUM 5000 UNITS: 5000 INJECTION INTRAVENOUS; SUBCUTANEOUS at 13:45

## 2022-01-19 RX ADMIN — METOPROLOL TARTRATE 100 MG: 100 TABLET, FILM COATED ORAL at 07:06

## 2022-01-19 RX ADMIN — HEPARIN SODIUM 5000 UNITS: 5000 INJECTION INTRAVENOUS; SUBCUTANEOUS at 21:09

## 2022-01-19 RX ADMIN — PANTOPRAZOLE SODIUM 40 MG: 40 TABLET, DELAYED RELEASE ORAL at 05:27

## 2022-01-19 RX ADMIN — REMDESIVIR 100 MG: 100 INJECTION, POWDER, LYOPHILIZED, FOR SOLUTION INTRAVENOUS at 17:52

## 2022-01-19 RX ADMIN — HEPARIN SODIUM 5000 UNITS: 5000 INJECTION INTRAVENOUS; SUBCUTANEOUS at 08:56

## 2022-01-19 NOTE — PLAN OF CARE
Problem: Potential for Falls  Goal: Patient will remain free of falls  Description: INTERVENTIONS:  - Educate patient/family on patient safety including physical limitations  - Instruct patient to call for assistance with activity   - Consult OT/PT to assist with strengthening/mobility   - Keep Call bell within reach  - Keep bed low and locked with side rails adjusted as appropriate  - Keep care items and personal belongings within reach  - Initiate and maintain comfort rounds  - Make Fall Risk Sign visible to staff  - Offer Toileting every  Hours, in advance of need  - Initiate/Maintainalarm  - Obtain necessary fall risk management equipment:   - Apply yellow socks and bracelet for high fall risk patients  - Consider moving patient to room near nurses station  Outcome: Progressing     Problem: PAIN - ADULT  Goal: Verbalizes/displays adequate comfort level or baseline comfort level  Description: Interventions:  - Encourage patient to monitor pain and request assistance  - Assess pain using appropriate pain scale  - Administer analgesics based on type and severity of pain and evaluate response  - Implement non-pharmacological measures as appropriate and evaluate response  - Consider cultural and social influences on pain and pain management  - Notify physician/advanced practitioner if interventions unsuccessful or patient reports new pain  Outcome: Progressing     Problem: INFECTION - ADULT  Goal: Absence or prevention of progression during hospitalization  Description: INTERVENTIONS:  - Assess and monitor for signs and symptoms of infection  - Monitor lab/diagnostic results  - Monitor all insertion sites, i e  indwelling lines, tubes, and drains  - Monitor endotracheal if appropriate and nasal secretions for changes in amount and color  - [place] appropriate cooling/warming therapies per order  - Administer medications as ordered  - Instruct and encourage patient and family to use good hand hygiene technique  - Identify and instruct in appropriate isolation precautions for identified infection/condition  Outcome: Progressing     Problem: SAFETY ADULT  Goal: Patient will remain free of falls  Description: INTERVENTIONS:  - Educate patient/family on patient safety including physical limitations  - Instruct patient to call for assistance with activity   - Consult OT/PT to assist with strengthening/mobility   - Keep Call bell within reach  - Keep bed low and locked with side rails adjusted as appropriate  - Keep care items and personal belongings within reach  - Initiate and maintain comfort rounds  - Make Fall Risk Sign visible to staff  - Offer Toileting every  Hours, in advance of need  - Initiate/Maintain alarm  - Obtain necessary fall risk management equipment:   - Apply yellow socks and bracelet for high fall risk patients  - Consider moving patient to room near nurses station  Outcome: Progressing  Goal: Maintain or return to baseline ADL function  Description: INTERVENTIONS:  -  Assess patient's ability to carry out ADLs; assess patient's baseline for ADL function and identify physical deficits which impact ability to perform ADLs (bathing, care of mouth/teeth, toileting, grooming, dressing, etc )  - Assess/evaluate cause of self-care deficits   - Assess range of motion  - Assess patient's mobility; develop plan if impaired  - Assess patient's need for assistive devices and provide as appropriate  - Encourage maximum independence but intervene and supervise when necessary  - Involve family in performance of ADLs  - Assess for home care needs following discharge   - Consider OT consult to assist with ADL evaluation and planning for discharge  - Provide patient education as appropriate  Outcome: Progressing  Goal: Maintains/Returns to pre admission functional level  Description: INTERVENTIONS:  - Perform BMAT or MOVE assessment daily    - Set and communicate daily mobility goal to care team and patient/family/caregiver  - Collaborate with rehabilitation services on mobility goals if consulted  - Perform Range of Motion  times a day  - Reposition patient every  hours    - Dangle patient  times a day  - Stand patient  times a day  - Ambulate patient  times a day  - Out of bed to chair  times a day   - Out of bed for meals  times a day  - Out of bed for toileting  - Record patient progress and toleration of activity level   Outcome: Progressing

## 2022-01-19 NOTE — PLAN OF CARE
Problem: PAIN - ADULT  Goal: Verbalizes/displays adequate comfort level or baseline comfort level  Description: Interventions:  - Encourage patient to monitor pain and request assistance  - Assess pain using appropriate pain scale  - Administer analgesics based on type and severity of pain and evaluate response  - Implement non-pharmacological measures as appropriate and evaluate response  - Consider cultural and social influences on pain and pain management  - Notify physician/advanced practitioner if interventions unsuccessful or patient reports new pain  Outcome: Progressing     Problem: INFECTION - ADULT  Goal: Absence or prevention of progression during hospitalization  Description: INTERVENTIONS:  - Assess and monitor for signs and symptoms of infection  - Monitor lab/diagnostic results  - Monitor all insertion sites, i e  indwelling lines, tubes, and drains  - Monitor endotracheal if appropriate and nasal secretions for changes in amount and color  - [place] appropriate cooling/warming therapies per order  - Administer medications as ordered  - Instruct and encourage patient and family to use good hand hygiene technique  - Identify and instruct in appropriate isolation precautions for identified infection/condition  Outcome: Progressing     Problem: DISCHARGE PLANNING  Goal: Discharge to home or other facility with appropriate resources  Description: INTERVENTIONS:  - Identify barriers to discharge w/patient and caregiver  - Arrange for needed discharge resources and transportation as appropriate  - Identify discharge learning needs (meds, wound care, etc )  - Arrange for interpretive services to assist at discharge as needed  - Refer to Case Management Department for coordinating discharge planning if the patient needs post-hospital services based on physician/advanced practitioner order or complex needs related to functional status, cognitive ability, or social support system  Outcome: Progressing Problem: Knowledge Deficit  Goal: Patient/family/caregiver demonstrates understanding of disease process, treatment plan, medications, and discharge instructions  Description: Complete learning assessment and assess knowledge base    Interventions:  - Provide teaching at level of understanding  - Provide teaching via preferred learning methods  Outcome: Progressing

## 2022-01-20 VITALS
RESPIRATION RATE: 17 BRPM | SYSTOLIC BLOOD PRESSURE: 118 MMHG | TEMPERATURE: 97.7 F | OXYGEN SATURATION: 98 % | DIASTOLIC BLOOD PRESSURE: 70 MMHG | HEART RATE: 56 BPM

## 2022-01-20 PROBLEM — N17.9 AKI (ACUTE KIDNEY INJURY) (HCC): Status: ACTIVE | Noted: 2022-01-20

## 2022-01-20 LAB
ALBUMIN SERPL BCP-MCNC: 2.4 G/DL (ref 3.5–5)
ALP SERPL-CCNC: 47 U/L (ref 46–116)
ALT SERPL W P-5'-P-CCNC: 20 U/L (ref 12–78)
ANION GAP SERPL CALCULATED.3IONS-SCNC: 11 MMOL/L (ref 4–13)
AST SERPL W P-5'-P-CCNC: 26 U/L (ref 5–45)
BASOPHILS # BLD AUTO: 0.01 THOUSANDS/ΜL (ref 0–0.1)
BASOPHILS NFR BLD AUTO: 0 % (ref 0–1)
BILIRUB SERPL-MCNC: 0.2 MG/DL (ref 0.2–1)
BUN SERPL-MCNC: 30 MG/DL (ref 5–25)
CALCIUM ALBUM COR SERPL-MCNC: 8.9 MG/DL (ref 8.3–10.1)
CALCIUM SERPL-MCNC: 7.6 MG/DL (ref 8.3–10.1)
CHLORIDE SERPL-SCNC: 106 MMOL/L (ref 100–108)
CO2 SERPL-SCNC: 21 MMOL/L (ref 21–32)
CREAT SERPL-MCNC: 1.59 MG/DL (ref 0.6–1.3)
CRP SERPL QL: 19.9 MG/L
EOSINOPHIL # BLD AUTO: 0 THOUSAND/ΜL (ref 0–0.61)
EOSINOPHIL NFR BLD AUTO: 0 % (ref 0–6)
ERYTHROCYTE [DISTWIDTH] IN BLOOD BY AUTOMATED COUNT: 20.8 % (ref 11.6–15.1)
GFR SERPL CREATININE-BSD FRML MDRD: 40 ML/MIN/1.73SQ M
GLUCOSE SERPL-MCNC: 80 MG/DL (ref 65–140)
GLUCOSE SERPL-MCNC: 84 MG/DL (ref 65–140)
HCT VFR BLD AUTO: 26.9 % (ref 36.5–49.3)
HGB BLD-MCNC: 8.1 G/DL (ref 12–17)
IMM GRANULOCYTES # BLD AUTO: 0.02 THOUSAND/UL (ref 0–0.2)
IMM GRANULOCYTES NFR BLD AUTO: 1 % (ref 0–2)
LYMPHOCYTES # BLD AUTO: 1.06 THOUSANDS/ΜL (ref 0.6–4.47)
LYMPHOCYTES NFR BLD AUTO: 33 % (ref 14–44)
MCH RBC QN AUTO: 25.3 PG (ref 26.8–34.3)
MCHC RBC AUTO-ENTMCNC: 30.1 G/DL (ref 31.4–37.4)
MCV RBC AUTO: 84 FL (ref 82–98)
MONOCYTES # BLD AUTO: 1.17 THOUSAND/ΜL (ref 0.17–1.22)
MONOCYTES NFR BLD AUTO: 36 % (ref 4–12)
NEUTROPHILS # BLD AUTO: 0.97 THOUSANDS/ΜL (ref 1.85–7.62)
NEUTS SEG NFR BLD AUTO: 30 % (ref 43–75)
NRBC BLD AUTO-RTO: 0 /100 WBCS
PLATELET # BLD AUTO: 198 THOUSANDS/UL (ref 149–390)
PMV BLD AUTO: 11.2 FL (ref 8.9–12.7)
POTASSIUM SERPL-SCNC: 3.9 MMOL/L (ref 3.5–5.3)
PROT SERPL-MCNC: 7.9 G/DL (ref 6.4–8.2)
RBC # BLD AUTO: 3.2 MILLION/UL (ref 3.88–5.62)
SODIUM SERPL-SCNC: 138 MMOL/L (ref 136–145)
WBC # BLD AUTO: 3.23 THOUSAND/UL (ref 4.31–10.16)

## 2022-01-20 PROCEDURE — 85025 COMPLETE CBC W/AUTO DIFF WBC: CPT | Performed by: INTERNAL MEDICINE

## 2022-01-20 PROCEDURE — 82948 REAGENT STRIP/BLOOD GLUCOSE: CPT

## 2022-01-20 PROCEDURE — 99239 HOSP IP/OBS DSCHRG MGMT >30: CPT | Performed by: INTERNAL MEDICINE

## 2022-01-20 PROCEDURE — 86140 C-REACTIVE PROTEIN: CPT | Performed by: INTERNAL MEDICINE

## 2022-01-20 PROCEDURE — 80053 COMPREHEN METABOLIC PANEL: CPT | Performed by: INTERNAL MEDICINE

## 2022-01-20 RX ADMIN — HEPARIN SODIUM 5000 UNITS: 5000 INJECTION INTRAVENOUS; SUBCUTANEOUS at 05:14

## 2022-01-20 RX ADMIN — PANTOPRAZOLE SODIUM 40 MG: 40 TABLET, DELAYED RELEASE ORAL at 05:14

## 2022-01-20 RX ADMIN — ASPIRIN 81 MG CHEWABLE TABLET 81 MG: 81 TABLET CHEWABLE at 08:01

## 2022-01-20 RX ADMIN — MORPHINE SULFATE 15 MG: 15 TABLET, EXTENDED RELEASE ORAL at 08:01

## 2022-01-20 RX ADMIN — METOPROLOL TARTRATE 100 MG: 100 TABLET, FILM COATED ORAL at 08:01

## 2022-01-20 NOTE — PLAN OF CARE
Problem: Potential for Falls  Goal: Patient will remain free of falls  Description: INTERVENTIONS:  - Educate patient/family on patient safety including physical limitations  - Instruct patient to call for assistance with activity   - Consult OT/PT to assist with strengthening/mobility   - Keep Call bell within reach  - Keep bed low and locked with side rails adjusted as appropriate  - Keep care items and personal belongings within reach  - Initiate and maintain comfort rounds  - Make Fall Risk Sign visible to staff  - Offer Toileting every 2 Hours, in advance of need  - Initiate/Maintain   alarm  - Obtain necessary fall risk management equipment:     - Apply yellow socks and bracelet for high fall risk patients  - Consider moving patient to room near nurses station  Outcome: Progressing     Problem: PAIN - ADULT  Goal: Verbalizes/displays adequate comfort level or baseline comfort level  Description: Interventions:  - Encourage patient to monitor pain and request assistance  - Assess pain using appropriate pain scale  - Administer analgesics based on type and severity of pain and evaluate response  - Implement non-pharmacological measures as appropriate and evaluate response  - Consider cultural and social influences on pain and pain management  - Notify physician/advanced practitioner if interventions unsuccessful or patient reports new pain  Outcome: Progressing     Problem: INFECTION - ADULT  Goal: Absence or prevention of progression during hospitalization  Description: INTERVENTIONS:  - Assess and monitor for signs and symptoms of infection  - Monitor lab/diagnostic results  - Monitor all insertion sites, i e  indwelling lines, tubes, and drains  - Monitor endotracheal if appropriate and nasal secretions for changes in amount and color  - [place] appropriate cooling/warming therapies per order  - Administer medications as ordered  - Instruct and encourage patient and family to use good hand hygiene technique  - Identify and instruct in appropriate isolation precautions for identified infection/condition  Outcome: Progressing     Problem: SAFETY ADULT  Goal: Patient will remain free of falls  Description: INTERVENTIONS:  - Educate patient/family on patient safety including physical limitations  - Instruct patient to call for assistance with activity   - Consult OT/PT to assist with strengthening/mobility   - Keep Call bell within reach  - Keep bed low and locked with side rails adjusted as appropriate  - Keep care items and personal belongings within reach  - Initiate and maintain comfort rounds  - Make Fall Risk Sign visible to staff  - Offer Toileting every 2 Hours, in advance of need  - Initiate/Maintain   alarm  - Obtain necessary fall risk management equipment:     - Apply yellow socks and bracelet for high fall risk patients  - Consider moving patient to room near nurses station  Outcome: Progressing  Goal: Maintain or return to baseline ADL function  Description: INTERVENTIONS:  -  Assess patient's ability to carry out ADLs; assess patient's baseline for ADL function and identify physical deficits which impact ability to perform ADLs (bathing, care of mouth/teeth, toileting, grooming, dressing, etc )  - Assess/evaluate cause of self-care deficits   - Assess range of motion  - Assess patient's mobility; develop plan if impaired  - Assess patient's need for assistive devices and provide as appropriate  - Encourage maximum independence but intervene and supervise when necessary  - Involve family in performance of ADLs  - Assess for home care needs following discharge   - Consider OT consult to assist with ADL evaluation and planning for discharge  - Provide patient education as appropriate  Outcome: Progressing  Goal: Maintains/Returns to pre admission functional level  Description: INTERVENTIONS:  - Perform BMAT or MOVE assessment daily    - Set and communicate daily mobility goal to care team and patient/family/caregiver  - Collaborate with rehabilitation services on mobility goals if consulted  - Perform Range of Motion 3 times a day  - Reposition patient every 2 hours    - Dangle patient 3 times a day  - Stand patient 3 times a day  - Ambulate patient 3 times a day  - Out of bed to chair 3 times a day   - Out of bed for meals 3 times a day  - Out of bed for toileting  - Record patient progress and toleration of activity level   Outcome: Progressing

## 2022-01-20 NOTE — PROGRESS NOTES
[address]  Progress Note - Ruiz Judd 1940, [de-identified] y o  male MRN: [id number]  Unit[facility]Bed#: [id number] Encounter: [id number]  Primary Care Provider: No primary care provider on file  Date and time admitted to hospital: 1/18/2022 10:22 AM    Admitting Provider:  Radha Gaming DO  Discharge Provider:  Leana Moreno DO  Admission Date: 1/18/2022       Discharge Date: 01/20/22   LOS: 2  Primary Care Physician at Discharge: No primary care provider on file  None    HOSPITAL COURSE:  Ruiz Judd is a [de-identified] y o  male who presented with acute kidney injury in the setting of COVID-19 infection  The patient was vaccinated x2  He was found to have diarrheal illness related to his coronavirus infection  He was started on intravenous, fluid, he was subsequently rehydrated  And was subsequently rehydration the patient was started on remdesivir given high risk features including history of multiple myeloma  He did clinically improve and was subsequently discharged with planned outpatient follow-up  He will follow-up with an outpatient BMP within 1 week with his PCP    At the time of discharge the patient did report having loss of sense of smell which are both common and COVID-19  Was advised to stay adequately hydrated, and was without any respiratory distress  DISCHARGE DIAGNOSES  * COVID-19  Assessment & Plan  Patient presenting with mild COVID-19 infection  No oxygen requirement  Vaccination status, Vaccinated x 2, was due for 3rd vaccination booster today  Vaccination Date 04/14/2021- last vaccine  Oxygen requirements none  Remdesvir Day #3/5   Trend CRP, Prone as possible, OOB TID  Trend CMP,CBC daily    Chest x-ray clear- with no evidence of pneumonia  Mostly has GI symptoms related to COVID-19        MELANIE (acute kidney injury) ([facility] [address] )  Assessment & Plan  MELANIE, POA, resolving, in the setting of diarrhea and poor PO intake, as evidenced by creatinine 2 3 in ED, now 1 59, requiring IVF  Improving, repeat BMP in one week with PCP  Recent Labs     01/18/22  1131 01/19/22  0528 01/20/22  0510   CREATININE 2 30* 1 65* 1 59*         Anemia  Assessment & Plan   No evidence of bleeding -  Likely component of dilution    Chronic pain  Assessment & Plan  Continue home medication of OxyContin, twice a day    Paroxysmal atrial fibrillation (HCC)  Assessment & Plan  Ventricular rate control  No longer on Eliquis, no anticoagulation PTA      Multiple myeloma (HCC)  Assessment & Plan  Followed by [facility] On[facility]ology  Diagnosed in 2014  - has received multiple treatments with multiple agents  , has been on maintenance cycle since January 2020 to present  Current therapy is Ixazomib 4 mg weekly starting 10/5/2021    Hiatal hernia with GERD  Assessment & Plan  Continue PPI therapy    Essential hypertension  Assessment & Plan  Continue home medications with hold parameters      CONSULTING PROVIDERS   None    PROCEDURES PERFORMED  * No surgery found *    RADIOLOGY RESULTS  CT abdomen pelvis wo contrast    Result Date: 1/18/2022  Narrative: CT ABDOMEN AND PELVIS WITHOUT IV CONTRAST INDICATION:   Diarrhea RLQ abdominal pain (Age >= 14y) RLQ pain, diarrhea  Multiple myeloma  Patient has confirmed [id number] as of 1/18/2022  COMPARISON:  None  TECHNIQUE:  CT examination of the abdomen and pelvis was performed without intravenous contrast   Axial, sagittal, and coronal 2D reformatted images were created from the source data and submitted for interpretation  Radiation dose length product (DLP) for this visit:  594 mGy-cm   This examination, like all CT scans performed in the [facility], was performed utilizing techniques to minimize radiation dose exposure, including the use of iterative reconstruction and automated exposure control  Enteric contrast was not administered   FINDINGS: ABDOMEN LOWER CHEST:  No clinically significant abnormality identified in the visualized lower chest  LIVER/BILIARY TREE:  Unremarkable  GALLBLADDER:  No calcified gallstones  No pericholecystic inflammatory change  SPLEEN:  Calcified granulomata are noted in the spleen  No suspicious splenic mass  PANCREAS:  Unremarkable  ADRENAL GLANDS:  Unremarkable  KIDNEYS/URETERS:  One or more simple renal cyst(s) is noted  Otherwise unremarkable kidneys  No hydronephrosis  STOMACH AND BOWEL:  Moderate hiatal hernia  No bowel obstruction  Scattered colonic diverticula  APPENDIX:  No findings to suggest appendicitis  ABDOMINOPELVIC CAVITY:  No ascites  No pneumoperitoneum  No lymphadenopathy  VESSELS:  Atherosclerotic changes are present  No evidence of aneurysm  PELVIS REPRODUCTIVE ORGANS:  Unremarkable for patient's age  URINARY BLADDER:  Unremarkable  ABDOMINAL WALL/INGUINAL REGIONS:  Large right inguinal hernia containing nonobstructed loops of small bowel and associated mesenteric fat  OSSEOUS STRUCTURES:  No definite acute fracture  Age-indeterminate mild superior endplate compression fractures at T12 and L4  Multilevel degenerative changes in the lumbar spine  Numerous scattered lytic osseous foci and areas of trabecular rarefaction in keeping with the history of multiple myeloma  Severe fatty atrophy of the left gluteus minimus muscle  Impression: Moderate hiatal hernia  Large right inguinal hernia containing nonobstructed loops of small bowel  No definite acute osseous abnormality  Age-indeterminate mild superior endplate compression fractures at T12 and L4  Workstation performed: [id number]     XR chest 1 view portable    Result Date: 1/18/2022  Narrative: CHEST INDICATION:   weakness  Patient has confirmed COVID-19  COMPARISON:  Chest radiograph May 5, 2021 EXAM PERFORMED/VIEWS:  XR CHEST PORTABLE FINDINGS: Cardiomediastinal silhouette appears unremarkable  Persistent retrocardiac opacity which may represent a hiatal hernia  The lungs are clear  No pneumothorax or pleural effusion   Bilateral glenohumeral osteoarthrosis  Impression: No acute cardiopulmonary disease   Workstation performed: [id number]       LABS  Results from last 7 days   Lab Units 01/20/22  0510 01/19/22  0528 01/18/22  1131   WBC Thousand/uL 3 23* 4 19* 4 25*   HEMOGLOBIN g/dL 8 1* 8 1* 8 8*   HEMATOCRIT % 26 9* 26 4* 29 4*   MCV fL 84 84 85   PLATELETS Thousands/uL 198 213 250     Results from last 7 days   Lab Units 01/20/22  0510 01/19/22  0528 01/18/22  1131   SODIUM mmol/L 138 134* 135*   POTASSIUM mmol/L 3 9 3 9 3 9   CHLORIDE mmol/L 106 105 102   CO2 mmol/L 21 16* 19*   BUN mg/dL 30* 30* 35*   CREATININE mg/dL 1 59* 1 65* 2 30*   CALCIUM mg/dL 7 6* 7 7* 8 1*   ALBUMIN g/dL 2 4* 2 5* 2 7*   TOTAL BILIRUBIN mg/dL 0 20 0 18* 0 24   ALK PHOS U/L 47 50 52   ALT U/L 20 22 25   AST U/L 26 24 25   EGFR ml/min/1 73sq m 40 38 25   GLUCOSE RANDOM mg/dL 80 88 99     Results from last 7 days   Lab Units 01/18/22  1733 01/18/22  1342 01/18/22  1131   HS TNI 0HR ng/L  --   --  26   HS TNI 2HR ng/L  --  24  --    HS TNI 4HR ng/L 21  --   --               Results from last 7 days   Lab Units 01/20/22  0728   POC GLUCOSE mg/dl 84                       Cultures:   Results from last 7 days   Lab Units 01/18/22  1142   COLOR UA  Yellow   CLARITY UA  Clear   SPEC GRAV UA  1 015   PH UA  5 5   LEUKOCYTES UA  Negative   NITRITE UA  Negative   GLUCOSE UA mg/dl Negative   KETONES UA mg/dl Negative   BILIRUBIN UA  Negative   BLOOD UA  Moderate*      Results from last 7 days   Lab Units 01/18/22  1142   RBC UA /hpf None Seen   WBC UA /hpf Innumerable*   EPITHELIAL CELLS WET PREP /hpf Occasional   BACTERIA UA /hpf Occasional      Results from last 7 days   Lab Units 01/18/22  1142 01/18/22  1131   URINE CULTURE  <10,000 cfu/ml   --    INFLUENZA A PCR   --  Negative       PHYSICAL EXAM:  Vitals:   Blood Pressure: 118/70 (01/20/22 0654)  Pulse: 56 (01/20/22 0654)  Temperature: 97 7 °F (36 5 °C) (01/20/22 0654)  Temp Source: Oral (01/20/22 2045)  Respirations: 17 (01/20/22 0654)  SpO2: 98 % (01/20/22 0654)      General: well appearing, no acute distress  HEENT: atraumatic, PERRLA, moist mucosa, normal pharynx, normal tonsils and adenoids, normal tongue, no fluid in sinuses  Neck: Trachea midline, no carotid bruit, no masses  Respiratory: normal chest wall expansion, CTA B, no r/r/w, no rubs  Cardiovascular: RRR, no m/r/g, Normal S1 and S2  Abdomen: Soft, non-tender, non-distended, normal bowel sounds in all quadrants, no hepatosplenomegaly, no tympany  Rectal: deferred  Musculoskeletal: normal ROM in upper and lower extremities  Integumentary: warm, dry, and pink, with no rash, purpura, or petechia  Heme/Lymph: no lymphadenopathy, no bruises  Neurological: Cranial Nerves II-XII grossly intact, no tics, normal sensation to pressure and light touch  Psychiatric: cooperative with normal mood, affect, and cognition      Discharge Disposition: Home/Self Care      Test Results Pending at Discharge:   [unfilled]        Medications   · Summary of Medication Adjustments made as a result of this hospitalization:  See discharge list  · Medication Dosing Tapers - Please refer to Discharge Medication List for details on any medication dosing tapers (if applicable to patient)  · Discharge Medication List: See after visit summary for reconciled discharge medications  Diet restrictions:         Diet Orders   (From admission, onward)             Start     Ordered    01/18/22 1423  Diet Regular; Regular House  (ED Bridging Orders Panel)  Diet effective now        References:    Nutrtion Support Algorithm Enteral vs  Parenteral   Question Answer Comment   Diet Type Regular    Regular Regular House    RD to adjust diet per protocol?  Yes        01/18/22 1422              Activity restrictions: No strenuous activity  Discharge Condition: good    Outpatient Follow-Up and Discharge Instructions  See after visit summary section titled Discharge Instructions for information provided to patient and family  Code Status: Level 1 - Full Code  Discharge Statement   I spent 35 minutes discharging the patient  This time was spent on the day of discharge  Greater than 50% of total time was spent with the patient and / or family counseling and / or coordination of care  ** Please Note: This note was completed in part utilizing [employer] Direct Software  Grammatical errors, random word insertions, spelling mistakes, and incomplete sentences may be an occasional consequence of this system secondary to software limitations, ambient noise, and hardware issues  If you have any questions or concerns about the content, text, or information contained within the body of this dictation, please contact the provider for clarification  **

## 2022-01-20 NOTE — ASSESSMENT & PLAN NOTE
Followed by [facility] Oncology/hematology  Diagnosed in 2014  - has received multiple treatments with multiple agents  , has been on maintenance cycle since January 2020 to present  Current therapy is Ixazomib 4 mg weekly starting 10/5/2021

## 2022-01-20 NOTE — ASSESSMENT & PLAN NOTE
Patient presenting with mild COVID-19 infection  No oxygen requirement  Vaccination status, Vaccinated x 2, was due for 3rd vaccination booster today  Vaccination Date 04/14/2021- last vaccine  Oxygen requirements none  Remdesvir Day #3/5   Trend CRP, Prone as possible, OOB TID  Trend CMP,CBC daily    Chest x-ray clear- with no evidence of pneumonia  Mostly has GI symptoms related to COVID-19

## 2022-01-20 NOTE — ASSESSMENT & PLAN NOTE
Followed by [facility] Oncology/hematology  Diagnosed in 2014  - has received multiple treatments with multiple agents  , has been on maintenance cycle since January 2020 to present  Current therapy is Ixazomib 4 mg weekly starting 10/5/2021

## 2022-01-20 NOTE — ASSESSMENT & PLAN NOTE
Continue home medications with hold parameters Group Topic: BH Symptom Management    Date: March 24  Start Time: 11:00 AM  End Time: 12:00 PM    Focus: Relaxation  Number in attendance: 10    Watched \"Letting Go of Stress\"; Discussed relaxation techniques and sources of additional relaxations materials.       Patient Response: Attentive and Good eye contact    Pt appeared attentive and interested in topic.

## 2022-01-20 NOTE — DISCHARGE INSTR - AVS FIRST PAGE
Dear Kari Rivera,     It was our pleasure to care for you here at [facility], [facility]  It is our hope that we were always able to exceed the expected standards for your care during your stay  You were hospitalized due to acute kidney injury in the setting of COVID-19  You were cared for on the 2nd floor by Shelby Noonan,  with the [facility] who covers for your primary care physician (PCP), No primary care provider on file  , while you were hospitalized  If you have any questions or concerns related to this hospitalization, you may contact us at [phone]  For follow up as well as any medication refills, we recommend that you follow up with your primary care physician  A registered nurse will reach out to you by phone within a few days after your discharge to answer any additional questions that you may have after going home  However, at this time we provide for you here, the most important instructions / recommendations at discharge:     Notable Medication Adjustments -   No changes to chronic home medication  Testing Required after Discharge -   Please get a repeat basic metabolic profile, blood test with your PCP in 1 week, have him order this  Important follow up information -   None  Other Instructions -     CoVID-19 Yelitza de cuidado domiciliario  Mittal proveedor de [place] y/o personal de cole pública lo/la ha evaluado y ha determinado que no necesita ser hospitalizado/a en david momento  ? En david momento usted puede ser aislado/a en casa donde será monitoreado/a por el personal de mittal departamento de cole local o estatal  Debe seguir cuidadosamente los pasos de prevención y aislamiento que se indican a continuación, hasta que un proveedor de atención médica o el departamento de cole local o estatal indique que puede volver a ashley actividades normales    Celester Purchase en casa excepto si necesita recibir [employer] levemente enfermas con COVID-19 pueden aislarse en casa evie mittal recuperacion  Usted debe restringir las actividades fuera de mittal hogar, excepto para recibir [employer]  No se presente al Analy Kov[place], a la escuela o en áreas públicas  Evite el uso del transporte público, el uso compartido de transporte o los taxis  Aislese de [employer] y [place] en mittal domicilio  Personas: En la medida de lo posible, debe quedarte en alex habitación específica y lejos de otras personas en mittal hogar  Además, debe usar un baño separado, si está disponible  Animales: Debe restringir el contacto con mascotas y otros animales [address] enfermo/a con COVID-19, al igual que lo haría con [employer]  Aunque no mobley habido informes de mascotas u otros animales que se enferman con COVID-19, todavía se recomienda que las personas enfermas con COVID-19 limiten el contacto con los animales hasta que se sepa más información sobre el virus  En lo posible, pida a otro miembro de mittal hogar que cuide de ashley animales mientras esté enfermo/a  Si está enfermo/a con COVID-19, evite el contacto con mittal mascota, incluyendo acariciar, abrazar, besar o ser lamido/a, al igual que compartir alimentos  Si debe cuidar de mittal mascota o estar cerca de [employer] está enfermo/a, lávese las katty antes y después de interactuar con las mascotas y use alex máscara facial  Consulte COVID-19 y Animales para obtener Kristin Loving antes de visitar a mittal médico  Si tiene [employer] al proveedor de [employer] y jose que tiene o puede tener COVID-19  [place] ayudará al [employer] del proveedor de atención médica a dao medidas para evitar que otras personas se infecten o expongan  Utilize máscara facial  Debe usar mascara facial cuando esté cerca de otras personas (por ejemplo, compartir alex habitación o vehículo) o mascotas y antes de entrar en la oficina de un proveedor de [employer]   Si usted no puede usar máscara facial (por ejemplo, porque causa problemas para respirar), entonces las personas que viven con usted no deben permanecer en la misma habitación con usted, o deben usar máscara facial si entran a mittal habitación  Ge Rubio mittal tos y/o estornudos   Cúbrase la boca y la Tretha Faroese con un pañuelo desechable cuando tosa o estornude  Tire los pañuelos usados en un contenedor de basura que tenga cobertura  Lávese las katty inmediatamente con agua y jabón evie al menos 21 segundos o, si no hay agua y jabón disponibles, límpiese las katty con un desinfectante de katty a base de alcohol que contenga al menos un 60% de alcohol  Límpiese las katty con frecuencia  [employer] las katty a menudo con agua y jabón evie al menos 20 segundos, especialmente después de sonarse la nariz, toser o estornudar, ir al baño, y antes de comer o preparar alimentos  Si agua y jabón no están disponibles fácilmente, utilize un desinfectante de katty a base de alcohol con al menos un 60% de alcohol, cubriendo todas las superficies de ashley katty y frotándolas hasta que se sientan secas  Coletta [place] y agua son la mejor opción si las katty están visiblemente sucias  Evite tocarse los ojos, la nariz y la boca con las katty sin [place] [employer]  Evite compartir artículos personales en el hogar  No debe compartir platos, vasos para beber, tazas, utensilios para comer, toallas o ropa de cama con otras personas o mascotas en mittal hogar  Después de [employer], deben lavarse muy jimenez con agua y [place]  Limpie todas las superficies "de toque frequente" todos los [address] superficies de toque fecuente incluyen encimeras o [place], mesas, escritorios, pomos o perillas de deyanira, accesorios de baño, inodoros, teléfonos, teclados, tabletas y [address] de noche  Además, limpie las superficies que puedan contener michaela, heces fecales o líquidos corporales   Utilice un spray de limpieza para el hogar o alex toallita desechable, de acuerdo con las instrucciones de la etiqueta del product utilizado para limpiar  Las [employer] contienen instrucciones para un uso seguro y eficaz del producto de limpieza, incluidas las precauciones que deben tomarse al aplicar el producto, incluyendo el uso de guantes y asegurarse de que haya alex buena ventilación evie el uso del producto  Monitorea ashley síntomas  Busca atención médica inmediata si tu enfermedad está empeorando (por ejemplo, dificultad para respirar)  Antes de buscar [employer], llame a mittal proveedor de [employer] y jose que tiene, o está siendo evaluado para, COVID-19  Stephany alex mascara facial antes de entrar en las instalaciones  Estos pasos ayudarán al [employer] del proveedor de atención médica a evitar que otras personas en la oficina o la edgar de espera se infecten o expongan  [place] a mittal proveedor de [employer] llame al departamento de cole local o estatal  Las personas que se sometan a un seguimiento activo o que se les facilite la autosupervisión deben seguir las instrucciones proporcionadas por mittal departamento de cole local o profesionales de cole ocupacional, según sea apropiado  Si tiene alex emergencia médica y necesita llamar al 911, notifique al personal de despacho que tiene o está siendo evaluado para COVID-19  Si es posible, pongase alex mascara facial antes de que lleguen los servicios médicos de emergencia  Discontinuar el aislamiento del hogar  Los pacientes con COVID-19 confirmado deben permanecer bajo precauciones de aislamiento en el hogar hasta que se cumplan las siguientes condiciones:  No rowley tenido fiebre evie al menos 24 horas (es decir, un día completos sin fiebre sin el medicamento que reduce la fiebre)    Y  otros síntomas rowley nani (por ejemplo, cuando mittal tos o falta de aire rowley nani)  Y  Si tuvo alex enfermedad leve o moderada, rowley pasado al menos 10 dayron desde que aparecieron los síntomas por primera vez o si la enfermedad es grave (necesita oxígeno) o está inmunosuprimido, rowley pasado al menos 20 días desde que aparecieron los primeros síntomas  Los pacientes con COVID-19 confirmado también deben notificar a los contactos cercanos (incluido mittal lugar de [place]) y pedirles que se pongan en cuarentena  Actualmente, el contacto cercano se define marisela estar dentro de los 6 pies evie 15 minutos o más desde el período 24 horas comenzando 48 horas antes del inicio de los síntomas hasta el momento en que el paciente se aisló  El paciente debe indicar a los contactos cercanos de pacientes diagnosticados con COVID-19 que se pongan en cuarentena evie 14 días desde la última vez que tuvieron mittal último contacto con el Gisele  [facility]: [facility]    Please review this entire after visit summary as additional general instructions including medication list, appointments, activity, diet, any pertinent wound care, and other additional recommendations from your care team that may be provided for you        Sincerely,     Dioni Bui DO and Nurse Otf Sutton

## 2022-01-20 NOTE — CASE MANAGEMENT
Case Management Discharge Planning Note    Patient name Avril Evans  Location [facility] 68 2 /South 2 Dianelys Crawford* MRN [id number]  : 1940 Date 2022       Current Admission Date: 2022  Current Admission Diagnosis:COVID-19   Patient Active Problem List    Diagnosis Date Noted    Anemia 2022    COVID-19 2022    Chronic pain 2022    Fever 2021    Frequent PVCs 2021    [facility]    Essential hypertension 2019    Hiatal hernia with GERD 2017    [facility] 2015      LOS (days): 2  Geometric Mean LOS (GMLOS) (days): 4 00  Days to GMLOS:2 1     OBJECTIVE:  Risk of Unplanned Readmission Score: 19         Current admission status: Inpatient   Preferred Pharmacy:   [facility] [address] [place] [address], [address] [place] [address],[address] [address]  Phone: [phone] Fax: [phone]    Primary Care Provider: No primary care provider on file  Primary Insurance: MEDICARE  Secondary Insurance: [address]    DISCHARGE DETAILS: pt written for dc - no needs identified - CM will follow

## 2022-01-20 NOTE — PLAN OF CARE
Problem: Potential for Falls  Goal: Patient will remain free of falls  Description: INTERVENTIONS:  - Educate patient/family on patient safety including physical limitations  - Instruct patient to call for assistance with activity   - Consult OT/PT to assist with strengthening/mobility   - Keep Call bell within reach  - Keep bed low and locked with side rails adjusted as appropriate  - Keep care items and personal belongings within reach  - Initiate and maintain comfort rounds  - Make Fall Risk Sign visible to staff  - Offer Toileting every  Hours, in advance of need  - Initiate/Maintainalarm  - Obtain necessary fall risk management equipment:   - Apply yellow socks and bracelet for high fall risk patients  - Consider moving patient to room near nurses station  Outcome: Progressing     Problem: PAIN - ADULT  Goal: Verbalizes/displays adequate comfort level or baseline comfort level  Description: Interventions:  - Encourage patient to monitor pain and request assistance  - Assess pain using appropriate pain scale  - Administer analgesics based on type and severity of pain and evaluate response  - Implement non-pharmacological measures as appropriate and evaluate response  - Consider cultural and social influences on pain and pain management  - Notify physician/advanced practitioner if interventions unsuccessful or patient reports new pain  Outcome: Progressing     Problem: INFECTION - ADULT  Goal: Absence or prevention of progression during hospitalization  Description: INTERVENTIONS:  - Assess and monitor for signs and symptoms of infection  - Monitor lab/diagnostic results  - Monitor all insertion sites, i e  indwelling lines, tubes, and drains  - Monitor endotracheal if appropriate and nasal secretions for changes in amount and color  - Morgan appropriate cooling/warming therapies per order  - Administer medications as ordered  - Instruct and encourage patient and family to use good hand hygiene technique  - Identify and instruct in appropriate isolation precautions for identified infection/condition  Outcome: Progressing     Problem: SAFETY ADULT  Goal: Patient will remain free of falls  Description: INTERVENTIONS:  - Educate patient/family on patient safety including physical limitations  - Instruct patient to call for assistance with activity   - Consult OT/PT to assist with strengthening/mobility   - Keep Call bell within reach  - Keep bed low and locked with side rails adjusted as appropriate  - Keep care items and personal belongings within reach  - Initiate and maintain comfort rounds  - Make Fall Risk Sign visible to staff  - Offer Toileting every  Hours, in advance of need  - Initiate/Maintainalarm  - Obtain necessary fall risk management equipment:   - Apply yellow socks and bracelet for high fall risk patients  - Consider moving patient to room near nurses station  Outcome: Progressing  Goal: Maintain or return to baseline ADL function  Description: INTERVENTIONS:  -  Assess patient's ability to carry out ADLs; assess patient's baseline for ADL function and identify physical deficits which impact ability to perform ADLs (bathing, care of mouth/teeth, toileting, grooming, dressing, etc )  - Assess/evaluate cause of self-care deficits   - Assess range of motion  - Assess patient's mobility; develop plan if impaired  - Assess patient's need for assistive devices and provide as appropriate  - Encourage maximum independence but intervene and supervise when necessary  - Involve family in performance of ADLs  - Assess for home care needs following discharge   - Consider OT consult to assist with ADL evaluation and planning for discharge  - Provide patient education as appropriate  Outcome: Progressing  Goal: Maintains/Returns to pre admission functional level  Description: INTERVENTIONS:  - Perform BMAT or MOVE assessment daily    - Set and communicate daily mobility goal to care team and patient/family/caregiver  - Collaborate with rehabilitation services on mobility goals if consulted  - Perform Range of Motion times a day  - Reposition patient ev hours    - Dangle patient  times a day  - Stand patient  times a day  - Ambulate patient  times a day  - Out of bed to chair  times a day   - Out of bed for meals  times a day  - Out of bed for toileting  - Record patient progress and toleration of activity level   Outcome: Progressing

## 2022-01-20 NOTE — ASSESSMENT & PLAN NOTE
MELANIE, POA, resolving, in the setting of diarrhea and poor PO intake, as evidenced by creatinine 2 3 in ED, now 1 59, requiring IVF  Improving, repeat BMP in one week with PCP  Recent Labs     01/18/22  1131 01/19/22  0528 01/20/22  0510   CREATININE 2 30* 1 65* 1 59*

## 2022-01-20 NOTE — PROGRESS NOTES
[facility]  Progress Note - Giovana Worrell 1940, [de-identified] y o  male MRN: [id number]  Unit/Bed#: [facility] [address] [place] [address] [id number] Encounter: [id number]  Primary Care Provider: No primary care provider on file  Date and time admitted to hospital: 1/18/2022 10:22 AM    * COVID-19  Assessment & Plan  Patient presenting with mild COVID-19 infection  No oxygen requirement  Vaccination status, Vaccinated x 2, was due for 3rd vaccination booster today  Vaccination Date 04/14/2021- last vaccine  Oxygen requirements none  Remdesvir Day #2/5   Trend CRP, Prone as possible, OOB TID  Trend CMP,CBC daily  Chest x-ray clear- with no evidence of pneumonia  Mostly has GI symptoms related to COVID-19        Anemia  Assessment & Plan   No evidence of bleeding -  Likely component of dilution    Chronic pain  Assessment & Plan  Continue home medication of OxyContin, twice a day    Paroxysmal atrial fibrillation (HCC)  Assessment & Plan  Ventricular rate control  No longer on Eliquis, no anticoagulation PTA      Multiple myeloma (HCC)  Assessment & Plan  Followed by [facility]/hematology  Diagnosed in 2014  - has received multiple treatments with multiple agents  , has been on maintenance cycle since January 2020 to present  Current therapy is Ixazomib 4 mg weekly starting 10/5/2021    Hiatal hernia with GERD  Assessment & Plan  Continue PPI therapy    Essential hypertension  Assessment & Plan  Continue home medications with hold parameters    [facility] Internal Medicine Progress Note  Patient: Giovana Worrell [de-identified] y o  male   MRN: [id number]  PCP: No primary care provider on file    Unit/Bed#: [facility] 68 2 -01 Encounter: [id number]  Date Of Visit: 01/19/22    Assessment:    Principal Problem:    COVID-19  Active Problems:    Essential hypertension    Hiatal hernia with GERD    Multiple myeloma (HCC)    Paroxysmal atrial fibrillation (HCC)    Chronic pain    Anemia      Plan:    ·   Continue treatment for COVID-19  ·   IV fluid  ·  Repeat BMP       VTE Pharmacologic Prophylaxis:   Pharmacologic: Heparin  Mechanical VTE Prophylaxis in Place: Yes    Patient Centered Rounds: I have performed bedside rounds with nursing staff today  Discussions with Specialists or Other Care Team Provider:  Case management    Education and Discussions with Family / Patient:  Discussed with patient granddaughter    Time Spent for Care: 45 minutes  More than 50% of total time spent on counseling and coordination of care as described above  Current Length of Stay: 1 day(s)    Current Patient Status: Inpatient   Certification Statement: The patient will continue to require additional inpatient hospital stay due to  acute kidney injury    Discharge Plan / Estimated Discharge Date:  24H    Code Status: Level 1 - Full Code      Subjective:    patient seen examined, no acute complaints  Tolerating oral diet  No nausea vomiting or diarrhea  No abdominal pain    A complete and comprehensive 14 point organ system review has been performed and all other systems are negative other than stated above  Objective:     Vitals:   Temp (24hrs), Av 9 °F (37 2 °C), Min:97 8 °F (36 6 °C), Max:100 4 °F (38 °C)    Temp:  [97 8 °F (36 6 °C)-100 4 °F (38 °C)] 97 8 °F (36 6 °C)  HR:  [62-83] 62  Resp:  [16-20] 16  BP: (130-149)/(66-88) 130/66  SpO2:  [97 %-100 %] 100 %  There is no height or weight on file to calculate BMI  Input and Output Summary (last 24 hours):        Intake/Output Summary (Last 24 hours) at 2022  Last data filed at 2022 2134  Gross per 24 hour   Intake --   Output 200 ml   Net -200 ml       Physical Exam:     General: well appearing, no acute distress  HEENT: atraumatic, PERRLA, moist mucosa, normal pharynx, normal tonsils and adenoids, normal tongue, no fluid in sinuses  Neck: Trachea midline, no carotid bruit, no masses  Respiratory: normal chest wall expansion, CTA B, no r/r/w, no rubs  Cardiovascular: RRR, no m/r/g, Normal S1 and S2  Abdomen: Soft, non-tender, non-distended, normal bowel sounds in all quadrants, no hepatosplenomegaly, no tympany  Rectal: deferred  Musculoskeletal: normal ROM in upper and lower extremities  Integumentary: warm, dry, and pink, with no rash, purpura, or petechia  Heme/Lymph: no lymphadenopathy, no bruises  Neurological: Cranial Nerves II-XII grossly intact, no tics, normal sensation to pressure and light touch  Psychiatric: cooperative with normal mood, affect, and cognition      Additional Data:     Labs:    Results from last 7 days   Lab Units 01/19/22  0528   WBC Thousand/uL 4 19*   HEMOGLOBIN g/dL 8 1*   HEMATOCRIT % 26 4*   PLATELETS Thousands/uL 213   NEUTROS PCT % 54   LYMPHS PCT % 14   MONOS PCT % 32*   EOS PCT % 0     Results from last 7 days   Lab Units 01/19/22  0528   POTASSIUM mmol/L 3 9   CHLORIDE mmol/L 105   CO2 mmol/L 16*   BUN mg/dL 30*   CREATININE mg/dL 1 65*   CALCIUM mg/dL 7 7*   ALK PHOS U/L 50   ALT U/L 22   AST U/L 24           * I Have Reviewed All Lab Data Listed Above  * Additional Pertinent Lab Tests Reviewed:  Carmen Rodriguez Admission Reviewed    Imaging:    Imaging Reports Reviewed Today Include:  No new imaging  Imaging Personally Reviewed by Myself Includes:   No new imaging    Recent Cultures (last 7 days):     Results from last 7 days   Lab Units 01/18/22  1142   URINE CULTURE  <10,000 cfu/ml        Last 24 Hours Medication List:   Current Facility-Administered Medications   Medication Dose Route Frequency Provider Last Rate    acetaminophen  650 mg Oral Q6H PRN Noel Marcial DO      aluminum-magnesium hydroxide-simethicone  30 mL Oral Q6H PRN Imtiaz Bowling, DO      aspirin  81 mg Oral Daily Noel Oliver,       calcium carbonate  1,000 mg Oral Daily PRN Imtiaz Bowling, DO      heparin (porcine)  5,000 Units Subcutaneous Q8H [facility] Noel Marcial DO      metoprolol tartrate  100 mg Oral BID Noel Marcial DO      morphine  15 mg Oral Q12H Maura Roberts DO      ondansetron  4 mg Intravenous Q6H PRN Maura Roberts DO      pantoprazole  40 mg Oral Early Morning Noel Marcial DO      remdesivir  100 mg Intravenous Q24H Noel Marcial DO      simethicone  80 mg Oral 4x Daily PRN Maura Roberts DO          Today, Patient Was Seen By: Kevin Jiménez DO    ** Please Note: This note was completed in part utilizing [employer] Direct Software  Grammatical errors, random word insertions, spelling mistakes, and incomplete sentences may be an occasional consequence of this system secondary to software limitations, ambient noise, and hardware issues  If you have any questions or concerns about the content, text, or information contained within the body of this dictation, please contact the provider for clarification   **

## 2022-01-20 NOTE — DISCHARGE INSTRUCTIONS
Lesión Renal Aguda (LRA)  Se le diagnosticó alex lesión renal aguda (LRA)  La siguiente información se desarrolló para proporcionarle información sobre la LRA y [place]  ¿Qué es la Lesión Renal Aguda (LRA)? La LRA se produce cuando se reduce la función del riñón en un período corto de [place]  Esta afección genera alex acumulación de desechos en la michaela y puede causar retención de líquido, lo que produce inflamación en las piernas y dificultad para respirar  A veces llamada insuficiencia renal aguda, la LRA muchas veces puede revertirse si se detecta y trata rápido  ¿Cómo sabe si tiene alex LRA? La LRA se diagnostica mediante la evaluación del funcionamiento de los riñones  [place] se realiza Ok's obtención de [place] de michaela para medir el nivel de creatinina en [place]  A veces, la reducción de la producción de orina también puede indicar LRA  ¿Quién corre riesgo de padecer LRA? Cualquiera puede padecer LRA, daniel, en general, la padecen personas que ya están enfermas y pueden estar internadas  Las personas corren un mayor riesgo de padecer LRA si cumplen alguna de las siguientes condiciones:   son Plons de 72 años   tienen presión arterial anabella o baja   padecen alex enfermedad renal subyacente (marisela alex enfermedad renal crónica [ERC])   padecen alex enfermedad vascular periférica (endurecimiento de las arterias)   tienen enfermedades crónicas marisela alex enfermedad hepática, cardiopatía y diabetes   tienen un solo [place] Aguirre son los síntomas de la LRA? Puede o no tener síntomas que sugieren alex lesión renal hasta que la LRA haya progresado  Algunos de los síntomas son los siguientes:   No producir suficiente orina   Mayor inflamación de las piernas    Sentirse cansado   Dificultad para respirar   Náuseas   Confusión nueva o que ERI      ¿Cuáles son las causas de la LRA?   Las causas se dividen en dioni categorías:   La michaela que fluye a los riñones no es suficiente (p  ej , presión arterial baja, sangrado, diarrea, deshidratación)   Lesión directa en los riñones (p  ej , coágulos de [place], infecciones graves marisela sepsis, toxicidad de algún medicamento, tinte de contraste intravenoso usado para la cateterización cardíaca o tomografías)   Obstrucción de los tubos (uréteres) que drenan la orina de los riñones (p  ej , próstata agrandada, piedras en los riñones, coágulos de [place])    ¿Cuál es el tratamiento para la LRA? El tratamiento para la LRA se basa en corregir lo que la ocasionó  En general, implica eliminar la causa y dao medidas para evitar un mayor daño en los riñones  [place] puede requerir [employer] de líquidos o medicamentos intravenosos o de diálisis temporal     La diálisis es un proceso en el que se [place] alex máquina que cumple la función de los riñones para eliminar los desechos y ayudar a corregir el equilibrio de electrolitos y líquido [place] riñones se recuperan  Si se requiere diálisis para tratar la LRA, el médico evaluará al paciente a diario para determinar si los riñones muestran signos de recuperación  Las evaluaciones diarias determinan por cuánto tiempo debe continuar la diálisis  Dependiendo de la causa y el cj del daño, un episodio de LRA puede resolverse en pocos días, varias semanas o varios meses  ¿Cuáles son los efectos a madison plazo de tener un episodio de [place]?  Las [employer] padecen un episodio de [place] corren un mayor riesgo de sufrir otro episodio, así marisela otros problemas de [place], marisela alex enfermedad renal, un accidente cerebrovascular y Romel Chantel cardiopatía   En un pequeño número de personas que tuvieron alex enfermedad renal no reconocida, un episodio de LRA puede provocar alex enfermedad renal crónica (ERC) que requiere supervisión y tratamiento de por cristal  ¿Cómo se pueden evitar episodios futuros de LRA?    Asegúrese de realizar un seguimiento con el médico después de que le den el anabella del [facility] michaela para volver a evaluar y supervisar la función renal    Si tiene diabetes, mantenga el azúcar en michaela dentro del rango objetivo y asista a las citas con el especialista en diabetes   Si tiene hipertensión arterial, contrólese la presión arterial de manera regular para asegurarse de que se encuentre dentro del rango objetivo  o Si esmer medicamentos para la presión arterial llamados ACEI (inhibidores de la encima convertidora de angiotensina) o ARB (bloqueadores del receptor de angiotensina) (p  ej , Lisinopril, Enalapril, Diovan, [place]), el médico puede indicarle que saltee alex dosis o dos si está muy deshidratado y la presión arterial está bajando   Evite dao medicamentos marisela MODESTA (fármacos antiinflamatorios no esteroides) e inhibidores de la CACERES-2 (un tipo de [place]) que pueden ser nocivos para el funcionamiento de los riñones  Estos pueden incluir los medicamentos detallados en la siguiente tabla  Ejemplos de MODESTA e inhibidores de la CACERES-2   Hable con el médico o proveedor de atención médica antes de dejar de dao cualquiera de los medicamentos que le hayan recetado  Celecoxib (CELEBREX) Ketoprofen (ORUDIS, ORUVAIL)   Diclofenac (VOLTAREN, CATAFLAM) Ketorolac (TORADOL)   Diflunisal (DOLOBID) Meloxicam (MOBIC)   Etodolac (LODINE) Nabumetone (RELAFEN)   Fenoprofeno (NALFON) Naproxeno (ALEVE, NAPROSYN, NAPRELAN, ANAPROX)   Flurbiprofeno (ANSAID) Oxaprozin (DAYPRO)   Ibuprofeno (MOTRIN, ADVIL) Piroxicam (FELDENE)   Indometacina (INDOCIN) Sulindac (CLINORIL)    Tolmetina (TOLETIN)     ¿Las personas que padecen LRA deben seguir algún tipo especial de dieta? Las personas que padecen LRA u otra enfermedad renal a menudo tienen niveles elevados de potasio y fósforo en la michaela  Para evitar mayores daños a los riñones y complicaciones, la mayoría de los médicos recomienda seguir alex dieta saludable que incluya alimentos con un bajo contenido de potasio y fósforo      Se recomienda limitar el consumo de potasio a 2,5 gramos por día y el de fósforo a 800 miligramos por día   Un dietista puede ayudarlo a aprender [employer] tipo de [address]   Las tablas de la siguiente página pueden ayudarlo a elegir alimentos con un bajo contenido de potasio y fósforo  Los siguientes sitios web también son buenas adams de información:  [place] at  org/nutrition/Kidney-Disease-Stages-1-4   [employer] fr  https://www RallyOndk nih gov/health-information/kidney-disease/chronic-kidney-disease-ckd/eating-nutrition  https://[facility] Peppercoin/health/articles/15641-renal-diet-basics  RefurbishedAutos com cy    Si tiene [place] pregunta o inquietud sobre mittal afección, comuníquese con el médico o el proveedor de [facility]  Estas tablas pueden ayudarlo a elegir alimentos con bajo contenido de potasio y fósforo    EVITE estos alimentos con alto contenido de fósforo*: ELIJA estos alimentos con bajo contenido de fósforo*:   Leche, pudín, yogur de origen animal y de diferentes variedades de soja Leche de arroz (no fortificada), sustitutos de crema no lácteos   Quesos duros, ricota, [employer], queso crema sin Socorro Lindsay crema regular y bajo en grasa   Helado, yogur helado Sorbetes, paletas de frutas congeladas   Sopas hechas con Lowry Freed, frijoles, lentejas u otros ingredientes con alto contenido de fósforo Sopas de caldo o a base de agua o con otros ingredientes con bajo contenido de fósforo   Granos enteros, incluso panes integrales, galletas saladas, cereales, arroz y pastas, tortillas de maíz Granos refinados, incluso pan velazquez, galletas saladas, cereales, arroz y pastas   Panes rápidos, galletas, pantyra de maíz, Jazmine, roberta, matteo, nikhil, maria m de edda Panecillos caseros refinados (blancos), roscas, madalenas, panecillos ingleses, galletas de azúcar, galletas de mantequilla, pastel de [employer] secos (partidos, de ojos negros), frijoles (negros, garbanzos, pallares, rojos, blancos, pintos), lentejas Guisantes verdes (enlatados, congelados), judías verdes, judías [employer], violette, abadejo, jody Carne de res [place], jenkins, aves de james, otros pescados   Velia secos y semillas Palomitas de maíz   [place] de [place] u otras a base de velia secos, tofu, hamburguesas vegetarianas o de soja Mermelada, jalea, miel   Chocolate, incluso bebidas con chocolate Caramelos de algarroba (con sabor a chocolate), caramelos duros, gominolas   Clair, refrescos similares a Dr Benjamin Ya, lalitha saborizadas, tés embotellados, cervezas Refrescos de lima-alan, ginger ale o cervezas de raíz, agua corriente, refrescos con sabor a vainilla, refrescos con sabor a uva   *Xiao siempre las etiquetas y [employer] alimentos con ingredientes que contengan "fos"  EVITE estos alimentos con alto contenido de potasio*: ELIJA estos alimentos con bajo contenido de potasio*:      Leche (sin grasa, con bajo contenido de [place], [place], [facility], de soja), yogur    [place] crema regular y bajo en grasa      Frijoles (blancos, lima), coles de Bruselas, espinaca, acelga, brócoli, aguacate, alcachofas, patatas, camotes, tomates/salsa de tomate, remolacha      Judías verdes, brotes de alfalfa, brotes de bambú (enlatados), col, zanahorias, coliflor, deven, yovanny, lilian, endibia, [place], hongos, yuri, isi, pita, castañas de [facility] ([address]), abel, Esther [address], isis, Kurtis, Fiona, aye Huitron, gamal sparks [facility], [place], jenkins, mariscos, SunScottd, papaya, [facility], [facility], [facility], pasas y otros velia secos, ivelisse, [address], compota de [place] isabel, ana, kelly, jj, sandía, kimberley, efrain Gallardo, efrain jeronmio, [address] Soportújar, nueces de [facility], evan Rios, eze)     Panecillos caseros refinados (blancos), roscas, panecillos patty, anisha de steph, [address], michelle RASHEED  BARON, palomitas de [place], pretzels, espaguetis o [place], hummus      Jugo de [place] o de [place], Tajikian de Thomas de papaya, sherrie o kenan, cóctel de [facility] de arándVia Christi Hospitals rodiana [employer]

## 2022-01-20 NOTE — ASSESSMENT & PLAN NOTE
Patient presenting with mild COVID-19 infection  No oxygen requirement  Vaccination status, Vaccinated x 2, was due for 3rd vaccination booster today  Vaccination Date 04/14/2021- last vaccine  Oxygen requirements none  Remdesvir Day #2/5   Trend CRP, Prone as possible, OOB TID  Trend CMP,CBC daily    Chest x-ray clear- with no evidence of pneumonia  Mostly has GI symptoms related to COVID-19

## 2022-01-21 NOTE — DISCHARGE SUMMARY
[facility]  Discharge summary  - Ruiz Judd 1940, [de-identified] y o  male MRN: [id number]  Unit/Bed#: [facility] 87 212-01 Encounter: [id number]  Primary Care Provider: No primary care provider on file  Date and time admitted to hospital: 1/18/2022 10:22 AM     Admitting Provider:  Radha Gaming DO  Discharge Provider:  Leana Moreno DO  Admission Date: 1/18/2022       Discharge Date: 01/20/22   LOS: 2  Primary Care Physician at Discharge: No primary care provider on file  None     HOSPITAL COURSE:  Ruiz Judd is a [de-identified] y o  male who presented with acute kidney injury in the setting of COVID-19 infection  The patient was vaccinated x2  He was found to have diarrheal illness related to his coronavirus infection  He was started on intravenous, fluid, he was subsequently rehydrated      And was subsequently rehydration the patient was started on remdesivir given high risk features including history of multiple myeloma      He did clinically improve and was subsequently discharged with planned outpatient follow-up      He will follow-up with an outpatient BMP within 1 week with his PCP     At the time of discharge the patient did report having loss of sense of smell which are both common and COVID-19      Was advised to stay adequately hydrated, and was without any respiratory distress      DISCHARGE DIAGNOSES  * COVID-19  Assessment & Plan  Patient presenting with mild COVID-19 infection  No oxygen requirement  Vaccination status, Vaccinated x 2, was due for 3rd vaccination booster today  Vaccination Date 04/14/2021- last vaccine  Oxygen requirements none  Remdesvir Day #3/5   Trend CRP, Prone as possible, OOB TID  Trend CMP,CBC daily    Chest x-ray clear- with no evidence of pneumonia  Mostly has GI symptoms related to COVID-19           MELANIE (acute kidney injury) ([facility] [address] )  Assessment & Plan  MELANIE, POA, resolving, in the setting of diarrhea and poor PO intake, as evidenced by creatinine 2 3 in ED, now 1 59, requiring IVF  Improving, repeat BMP in one week with PCP        Recent Labs     01/18/22  1131 01/19/22  0528 01/20/22  0510   CREATININE 2 30* 1 65* 1 59*            Anemia  Assessment & Plan   No evidence of bleeding -  Likely component of dilution     Chronic pain  Assessment & Plan  Continue home medication of OxyContin, twice a day     Paroxysmal atrial fibrillation (HCC)  Assessment & Plan  Ventricular rate control  No longer on Eliquis, no anticoagulation PTA        Multiple myeloma ([address] )  Assessment & Plan  Followed by [facility] Oncology/hematology  Diagnosed in 2014  - has received multiple treatments with multiple agents  , has been on maintenance cycle since January 2020 to present  Current therapy is Ixazomib 4 mg weekly starting 10/5/2021     Hiatal hernia with GERD  Assessment & Plan  Continue PPI therapy     Essential hypertension  Assessment & Plan  Continue home medications with hold parameters        CONSULTING PROVIDERS   None     PROCEDURES PERFORMED  * No surgery found *     RADIOLOGY RESULTS  CT abdomen pelvis wo contrast     Result Date: 1/18/2022  Narrative: CT ABDOMEN AND PELVIS WITHOUT IV CONTRAST INDICATION:   Diarrhea RLQ abdominal pain (Age >= 14y) RLQ pain, diarrhea  Multiple myeloma  Patient has confirmed [id number] as of 1/18/2022  COMPARISON:  None  TECHNIQUE:  CT examination of the abdomen and pelvis was performed without intravenous contrast   Axial, sagittal, and coronal 2D reformatted images were created from the source data and submitted for interpretation  Radiation dose length product (DLP) for this visit:  594 mGy-cm   This examination, like all CT scans performed in the [facility], was performed utilizing techniques to minimize radiation dose exposure, including the use of iterative reconstruction and automated exposure control  Enteric contrast was not administered   FINDINGS: ABDOMEN LOWER CHEST:  No clinically significant abnormality identified in the visualized lower chest  LIVER/BILIARY TREE:  Unremarkable  GALLBLADDER:  No calcified gallstones  No pericholecystic inflammatory change  SPLEEN:  Calcified granulomata are noted in the spleen  No suspicious splenic mass  PANCREAS:  Unremarkable  ADRENAL GLANDS:  Unremarkable  KIDNEYS/URETERS:  One or more simple renal cyst(s) is noted  Otherwise unremarkable kidneys  No hydronephrosis  STOMACH AND BOWEL:  Moderate hiatal hernia  No bowel obstruction  Scattered colonic diverticula  APPENDIX:  No findings to suggest appendicitis  ABDOMINOPELVIC CAVITY:  No ascites  No pneumoperitoneum  No lymphadenopathy  VESSELS:  Atherosclerotic changes are present  No evidence of aneurysm  PELVIS REPRODUCTIVE ORGANS:  Unremarkable for patient's age  URINARY BLADDER:  Unremarkable  ABDOMINAL WALL/INGUINAL REGIONS:  Large right inguinal hernia containing nonobstructed loops of small bowel and associated mesenteric fat  OSSEOUS STRUCTURES:  No definite acute fracture  Age-indeterminate mild superior endplate compression fractures at T12 and L4  Multilevel degenerative changes in the lumbar spine  Numerous scattered lytic osseous foci and areas of trabecular rarefaction in keeping with the history of multiple myeloma  Severe fatty atrophy of the left gluteus minimus muscle       Impression: Moderate hiatal hernia  Large right inguinal hernia containing nonobstructed loops of small bowel  No definite acute osseous abnormality  Age-indeterminate mild superior endplate compression fractures at T12 and L4  Workstation performed: [id number]      XR chest 1 view portable     Result Date: 1/18/2022  Narrative: CHEST INDICATION:   weakness  Patient has confirmed COVID-19  COMPARISON:  Chest radiograph May 5, 2021 EXAM PERFORMED/VIEWS:  XR CHEST PORTABLE FINDINGS: Cardiomediastinal silhouette appears unremarkable  Persistent retrocardiac opacity which may represent a hiatal hernia  The lungs are clear    No pneumothorax or pleural effusion  Bilateral glenohumeral osteoarthrosis       Impression: No acute cardiopulmonary disease   Workstation performed: [id number]         LABS         Results from last 7 days   Lab Units 01/20/22  0510 01/19/22  0528 01/18/22  1131   WBC Thousand/uL 3 23* 4 19* 4 25*   HEMOGLOBIN g/dL 8 1* 8 1* 8 8*   HEMATOCRIT % 26 9* 26 4* 29 4*   MCV fL 84 84 85   PLATELETS Thousands/uL 198 213 250             Results from last 7 days   Lab Units 01/20/22  0510 01/19/22  0528 01/18/22  1131   SODIUM mmol/L 138 134* 135*   POTASSIUM mmol/L 3 9 3 9 3 9   CHLORIDE mmol/L 106 105 102   CO2 mmol/L 21 16* 19*   BUN mg/dL 30* 30* 35*   CREATININE mg/dL 1 59* 1 65* 2 30*   CALCIUM mg/dL 7 6* 7 7* 8 1*   ALBUMIN g/dL 2 4* 2 5* 2 7*   TOTAL BILIRUBIN mg/dL 0 20 0 18* 0 24   ALK PHOS U/L 47 50 52   ALT U/L 20 22 25   AST U/L 26 24 25   EGFR ml/min/1 73sq m 40 38 25   GLUCOSE RANDOM mg/dL 80 88 99             Results from last 7 days   Lab Units 01/18/22  1733 01/18/22  1342 01/18/22  1131   HS TNI 0HR ng/L  --   --  26   HS TNI 2HR ng/L  --  24  --    HS TNI 4HR ng/L 21  --   --                     Results from last 7 days   Lab Units 01/20/22  0728   POC GLUCOSE mg/dl 84                         Cultures:        Results from last 7 days   Lab Units 01/18/22  1142   COLOR UA   Yellow   CLARITY UA   Clear   SPEC GRAV UA   1 015   PH UA   5 5   LEUKOCYTES UA   Negative   NITRITE UA   Negative   GLUCOSE UA mg/dl Negative   KETONES UA mg/dl Negative   BILIRUBIN UA   Negative   BLOOD UA   Moderate*           Results from last 7 days   Lab Units 01/18/22  1142   RBC UA /hpf None Seen   WBC UA /hpf Innumerable*   EPITHELIAL CELLS WET PREP /hpf Occasional   BACTERIA UA /hpf Occasional            Results from last 7 days   Lab Units 01/18/22  1142 01/18/22  1131   URINE CULTURE   <10,000 cfu/ml   --    INFLUENZA A PCR    --  Negative         PHYSICAL EXAM:  Vitals:   Blood Pressure: 118/70 (01/20/22 0654)  Pulse: 56 (01/20/22 0654)  Temperature: 97 7 °F (36 5 °C) (01/20/22 0654)  Temp Source: Oral (01/20/22 0654)  Respirations: 17 (01/20/22 0654)  SpO2: 98 % (01/20/22 0654)        General: well appearing, no acute distress  HEENT: atraumatic, PERRLA, moist mucosa, normal pharynx, normal tonsils and adenoids, normal tongue, no fluid in sinuses  Neck: Trachea midline, no carotid bruit, no masses  Respiratory: normal chest wall expansion, CTA B, no r/r/w, no rubs  Cardiovascular: RRR, no m/r/g, Normal S1 and S2  Abdomen: Soft, non-tender, non-distended, normal bowel sounds in all quadrants, no hepatosplenomegaly, no tympany  Rectal: deferred  Musculoskeletal: normal ROM in upper and lower extremities  Integumentary: warm, dry, and pink, with no rash, purpura, or petechia  Heme/Lymph: no lymphadenopathy, no bruises  Neurological: Cranial Nerves II-XII grossly intact, no tics, normal sensation to pressure and light touch  Psychiatric: cooperative with normal mood, affect, and cognition        Discharge Disposition: Home/Self Care        Test Results Pending at Discharge:   [unfilled]           Medications   · Summary of Medication Adjustments made as a result of this hospitalization:  See discharge list  · Medication Dosing Tapers - Please refer to Discharge Medication List for details on any medication dosing tapers (if applicable to patient)  · Discharge Medication List: See after visit summary for reconciled discharge medications       Diet restrictions:               Diet Orders   (From admission, onward)                             Start     Ordered      01/18/22 1423   Diet Regular; Regular House  (ED Bridging Orders Panel)  Diet effective now        References:    Nutrtion Support Algorithm Enteral vs  Parenteral   Question Answer Comment   Diet Type Regular     Regular Regular House     RD to adjust diet per protocol?  Yes         01/18/22 1422                  Activity restrictions: No strenuous activity  Discharge Condition: good     Outpatient Follow-Up and Discharge Instructions  See after visit summary section titled Discharge Instructions for information provided to patient and family        Code Status: Level 1 - Full Code  Discharge Statement   I spent 35 minutes discharging the patient  This time was spent on the day of discharge  Greater than 50% of total time was spent with the patient and / or family counseling and / or coordination of care      ** Please Note: This note was completed in part utilizing [employer] Direct Software   Grammatical errors, random word insertions, spelling mistakes, and incomplete sentences may be an occasional consequence of this system secondary to software limitations, ambient noise, and hardware issues   If you have any questions or concerns about the content, text, or information contained within the body of this dictation, please contact the provider for clarification  **

## 2022-10-12 PROBLEM — R50.9 FEVER: Status: RESOLVED | Noted: 2021-05-05 | Resolved: 2022-10-12

## 2024-01-01 ENCOUNTER — HOSPITAL ENCOUNTER (INPATIENT)
Facility: HOSPITAL | Age: 84
LOS: 1 days | DRG: 871 | End: 2024-10-21
Attending: EMERGENCY MEDICINE | Admitting: INTERNAL MEDICINE
Payer: MEDICARE

## 2024-01-01 ENCOUNTER — APPOINTMENT (EMERGENCY)
Dept: RADIOLOGY | Facility: HOSPITAL | Age: 84
DRG: 871 | End: 2024-01-01
Payer: MEDICARE

## 2024-01-01 ENCOUNTER — APPOINTMENT (EMERGENCY)
Dept: CT IMAGING | Facility: HOSPITAL | Age: 84
DRG: 871 | End: 2024-01-01
Payer: MEDICARE

## 2024-01-01 ENCOUNTER — APPOINTMENT (INPATIENT)
Dept: RADIOLOGY | Facility: HOSPITAL | Age: 84
DRG: 871 | End: 2024-01-01
Payer: MEDICARE

## 2024-01-01 VITALS
DIASTOLIC BLOOD PRESSURE: 34 MMHG | BODY MASS INDEX: 23.85 KG/M2 | HEIGHT: 62 IN | TEMPERATURE: 94.5 F | RESPIRATION RATE: 15 BRPM | HEART RATE: 79 BPM | OXYGEN SATURATION: 64 % | WEIGHT: 129.63 LBS | SYSTOLIC BLOOD PRESSURE: 81 MMHG

## 2024-01-01 DIAGNOSIS — J18.0 BRONCHOPNEUMONIA: ICD-10-CM

## 2024-01-01 DIAGNOSIS — D70.9 SEVERE NEUTROPENIA (HCC): ICD-10-CM

## 2024-01-01 DIAGNOSIS — D64.9 ANEMIA: ICD-10-CM

## 2024-01-01 DIAGNOSIS — R41.82 ALTERED MENTAL STATUS: ICD-10-CM

## 2024-01-01 DIAGNOSIS — J96.01: ICD-10-CM

## 2024-01-01 DIAGNOSIS — A41.9: ICD-10-CM

## 2024-01-01 DIAGNOSIS — A41.9 SEVERE SEPSIS (HCC): ICD-10-CM

## 2024-01-01 DIAGNOSIS — R65.21: ICD-10-CM

## 2024-01-01 DIAGNOSIS — R50.81 NEUTROPENIC FEVER  (HCC): ICD-10-CM

## 2024-01-01 DIAGNOSIS — N17.9 AKI (ACUTE KIDNEY INJURY) (HCC): ICD-10-CM

## 2024-01-01 DIAGNOSIS — D70.9 NEUTROPENIC FEVER  (HCC): ICD-10-CM

## 2024-01-01 DIAGNOSIS — R65.20 SEVERE SEPSIS (HCC): ICD-10-CM

## 2024-01-01 DIAGNOSIS — R79.89 ELEVATED TROPONIN: ICD-10-CM

## 2024-01-01 DIAGNOSIS — A49.02 MRSA (METHICILLIN RESISTANT STAPHYLOCOCCUS AUREUS): Primary | ICD-10-CM

## 2024-01-01 LAB
2HR DELTA HS TROPONIN: 58 NG/L
4HR DELTA HS TROPONIN: 74 NG/L
ABO GROUP BLD BPU: NORMAL
ABO GROUP BLD: NORMAL
ABO GROUP BLD: NORMAL
ALBUMIN SERPL BCG-MCNC: 2 G/DL (ref 3.5–5)
ALBUMIN SERPL BCG-MCNC: 2.3 G/DL (ref 3.5–5)
ALP SERPL-CCNC: 28 U/L (ref 34–104)
ALP SERPL-CCNC: 40 U/L (ref 34–104)
ALT SERPL W P-5'-P-CCNC: 6 U/L (ref 7–52)
ALT SERPL W P-5'-P-CCNC: 98 U/L (ref 7–52)
ANION GAP SERPL CALCULATED.3IONS-SCNC: 14 MMOL/L (ref 4–13)
ANION GAP SERPL CALCULATED.3IONS-SCNC: 7 MMOL/L (ref 4–13)
APTT PPP: 32 SECONDS (ref 23–34)
APTT PPP: >210 SECONDS (ref 23–34)
AST SERPL W P-5'-P-CCNC: 156 U/L (ref 13–39)
AST SERPL W P-5'-P-CCNC: 16 U/L (ref 13–39)
ATRIAL RATE: 111 BPM
ATRIAL RATE: 416 BPM
ATRIAL RATE: 81 BPM
BACTERIA UR QL AUTO: ABNORMAL /HPF
BASE EX.OXY STD BLDV CALC-SCNC: 68.7 % (ref 60–80)
BASE EXCESS BLDA CALC-SCNC: -11 MMOL/L (ref -2–3)
BASE EXCESS BLDA CALC-SCNC: -11.6 MMOL/L
BASE EXCESS BLDV CALC-SCNC: -21.5 MMOL/L
BASOPHILS # BLD MANUAL: 0 THOUSAND/UL (ref 0–0.1)
BASOPHILS NFR MAR MANUAL: 1 % (ref 0–1)
BILIRUB SERPL-MCNC: 0.67 MG/DL (ref 0.2–1)
BILIRUB SERPL-MCNC: 0.67 MG/DL (ref 0.2–1)
BILIRUB UR QL STRIP: NEGATIVE
BLD GP AB SCN SERPL QL: NEGATIVE
BLD SMEAR INTERP: NORMAL
BODY TEMPERATURE: 94.8 DEGREES FEHRENHEIT
BPU ID: NORMAL
BUN SERPL-MCNC: 34 MG/DL (ref 5–25)
BUN SERPL-MCNC: 36 MG/DL (ref 5–25)
CA-I BLD-SCNC: 1.22 MMOL/L (ref 1.12–1.32)
CA-I BLD-SCNC: 1.29 MMOL/L (ref 1.12–1.32)
CALCIUM ALBUM COR SERPL-MCNC: 9 MG/DL (ref 8.3–10.1)
CALCIUM ALBUM COR SERPL-MCNC: 9.9 MG/DL (ref 8.3–10.1)
CALCIUM SERPL-MCNC: 7.6 MG/DL (ref 8.4–10.2)
CALCIUM SERPL-MCNC: 8.3 MG/DL (ref 8.4–10.2)
CARDIAC TROPONIN I PNL SERPL HS: 143 NG/L
CARDIAC TROPONIN I PNL SERPL HS: 150 NG/L (ref 8–18)
CARDIAC TROPONIN I PNL SERPL HS: 159 NG/L
CARDIAC TROPONIN I PNL SERPL HS: 85 NG/L
CHLORIDE SERPL-SCNC: 110 MMOL/L (ref 96–108)
CHLORIDE SERPL-SCNC: 110 MMOL/L (ref 96–108)
CK SERPL-CCNC: 193 U/L (ref 39–308)
CLARITY UR: CLEAR
CO2 SERPL-SCNC: 19 MMOL/L (ref 21–32)
CO2 SERPL-SCNC: 22 MMOL/L (ref 21–32)
COLOR UR: YELLOW
CREAT SERPL-MCNC: 2.22 MG/DL (ref 0.6–1.3)
CREAT SERPL-MCNC: 2.36 MG/DL (ref 0.6–1.3)
CROSSMATCH: NORMAL
D DIMER PPP FEU-MCNC: 7.77 UG/ML FEU
DEPRECATED AT III PPP: 15 % OF NORMAL (ref 92–136)
EOSINOPHIL # BLD MANUAL: 0 THOUSAND/UL (ref 0–0.4)
EOSINOPHIL NFR BLD MANUAL: 0 % (ref 0–6)
ERYTHROCYTE [DISTWIDTH] IN BLOOD BY AUTOMATED COUNT: 17.1 % (ref 11.6–15.1)
ERYTHROCYTE [DISTWIDTH] IN BLOOD BY AUTOMATED COUNT: 17.7 % (ref 11.6–15.1)
FDP BLD QL AGGL: >20 <40
FIBRINOGEN PPP-MCNC: <60 MG/DL (ref 206–523)
FLUAV AG UPPER RESP QL IA.RAPID: NEGATIVE
FLUBV AG UPPER RESP QL IA.RAPID: NEGATIVE
GFR SERPL CREATININE-BSD FRML MDRD: 24 ML/MIN/1.73SQ M
GFR SERPL CREATININE-BSD FRML MDRD: 26 ML/MIN/1.73SQ M
GLUCOSE SERPL-MCNC: 58 MG/DL (ref 65–140)
GLUCOSE SERPL-MCNC: 62 MG/DL (ref 65–140)
GLUCOSE SERPL-MCNC: 74 MG/DL (ref 65–140)
GLUCOSE SERPL-MCNC: 76 MG/DL (ref 65–140)
GLUCOSE UR STRIP-MCNC: NEGATIVE MG/DL
HCO3 BLDA-SCNC: 19.8 MMOL/L (ref 22–28)
HCO3 BLDA-SCNC: 19.8 MMOL/L (ref 22–28)
HCO3 BLDV-SCNC: 9.4 MMOL/L (ref 24–30)
HCT VFR BLD AUTO: 19.2 % (ref 36.5–49.3)
HCT VFR BLD AUTO: 22.2 % (ref 36.5–49.3)
HCT VFR BLD CALC: 18 % (ref 36.5–49.3)
HGB BLD-MCNC: 6.2 G/DL (ref 12–17)
HGB BLD-MCNC: 6.8 G/DL (ref 12–17)
HGB BLDA-MCNC: 6.1 G/DL (ref 12–17)
HGB UR QL STRIP.AUTO: ABNORMAL
HYALINE CASTS #/AREA URNS LPF: ABNORMAL /LPF
INR PPP: 1.64 (ref 0.85–1.19)
INR PPP: 7.67 (ref 0.85–1.19)
INR PPP: 8.73 (ref 0.85–1.19)
KETONES UR STRIP-MCNC: NEGATIVE MG/DL
LACTATE SERPL-SCNC: 11.8 MMOL/L (ref 0.5–2)
LACTATE SERPL-SCNC: 12.9 MMOL/L (ref 0.5–2)
LACTATE SERPL-SCNC: 4.6 MMOL/L (ref 0.5–2)
LACTATE SERPL-SCNC: 6 MMOL/L (ref 0.5–2)
LEUKOCYTE ESTERASE UR QL STRIP: NEGATIVE
LYMPHOCYTES # BLD AUTO: 0.19 THOUSAND/UL (ref 0.6–4.47)
LYMPHOCYTES # BLD AUTO: 81 % (ref 14–44)
MAGNESIUM SERPL-MCNC: 1.6 MG/DL (ref 1.9–2.7)
MCH RBC QN AUTO: 33.2 PG (ref 26.8–34.3)
MCH RBC QN AUTO: 34.4 PG (ref 26.8–34.3)
MCHC RBC AUTO-ENTMCNC: 30.6 G/DL (ref 31.4–37.4)
MCHC RBC AUTO-ENTMCNC: 32.3 G/DL (ref 31.4–37.4)
MCV RBC AUTO: 107 FL (ref 82–98)
MCV RBC AUTO: 108 FL (ref 82–98)
MONOCYTES # BLD AUTO: 0.03 THOUSAND/UL (ref 0–1.22)
MONOCYTES NFR BLD: 14 % (ref 4–12)
MUCOUS THREADS UR QL AUTO: ABNORMAL
NEUTROPHILS # BLD MANUAL: 0 THOUSAND/UL (ref 1.85–7.62)
NEUTS SEG NFR BLD AUTO: 2 % (ref 43–75)
NITRITE UR QL STRIP: NEGATIVE
NON-SQ EPI CELLS URNS QL MICRO: ABNORMAL /HPF
NRBC BLD AUTO-RTO: 5 /100 WBC (ref 0–2)
O2 CT BLDA-SCNC: 4.9 ML/DL (ref 16–23)
O2 CT BLDV-SCNC: 8.1 ML/DL
OXYHGB MFR BLDA: 42.5 % (ref 94–97)
P AXIS: 59 DEGREES
PCO2 BLD: 22 MMOL/L (ref 21–32)
PCO2 BLD: 89.2 MM HG (ref 36–44)
PCO2 BLDA: 84.7 MM HG (ref 36–44)
PCO2 BLDV: 44.8 MM HG (ref 42–50)
PCO2 TEMP ADJ BLDA: 77.3 MM HG (ref 36–44)
PH BLD: 6.95 [PH] (ref 7.35–7.45)
PH BLD: 7.01 [PH] (ref 7.35–7.45)
PH BLDA: 6.99 [PH] (ref 7.35–7.45)
PH BLDV: 6.94 [PH] (ref 7.3–7.4)
PH UR STRIP.AUTO: 5.5 [PH]
PHOSPHATE SERPL-MCNC: 7.9 MG/DL (ref 2.3–4.1)
PLATELET # BLD AUTO: 66 THOUSANDS/UL (ref 149–390)
PLATELET # BLD AUTO: 78 THOUSANDS/UL (ref 149–390)
PLATELET # BLD AUTO: 81 THOUSANDS/UL (ref 149–390)
PLATELET BLD QL SMEAR: ABNORMAL
PMV BLD AUTO: 11.2 FL (ref 8.9–12.7)
PMV BLD AUTO: 11.6 FL (ref 8.9–12.7)
PMV BLD AUTO: 12 FL (ref 8.9–12.7)
PO2 BLD: 26.3 MM HG (ref 75–129)
PO2 BLD: 54 MM HG (ref 75–129)
PO2 BLDA: 30.5 MM HG (ref 75–129)
PO2 BLDV: 55.9 MM HG (ref 35–45)
POIKILOCYTOSIS BLD QL SMEAR: PRESENT
POTASSIUM BLD-SCNC: 5 MMOL/L (ref 3.5–5.3)
POTASSIUM SERPL-SCNC: 3.2 MMOL/L (ref 3.5–5.3)
POTASSIUM SERPL-SCNC: 5 MMOL/L (ref 3.5–5.3)
PR INTERVAL: 204 MS
PROCALCITONIN SERPL-MCNC: 78.53 NG/ML
PROT SERPL-MCNC: 3 G/DL (ref 6.4–8.4)
PROT SERPL-MCNC: 3.7 G/DL (ref 6.4–8.4)
PROT UR STRIP-MCNC: ABNORMAL MG/DL
PROTHROMBIN TIME: 19.5 SECONDS (ref 12.3–15)
PROTHROMBIN TIME: 62 SECONDS (ref 12.3–15)
PROTHROMBIN TIME: 68.3 SECONDS (ref 12.3–15)
QRS AXIS: 15 DEGREES
QRS AXIS: 19 DEGREES
QRS AXIS: 29 DEGREES
QRSD INTERVAL: 100 MS
QRSD INTERVAL: 82 MS
QRSD INTERVAL: 92 MS
QT INTERVAL: 320 MS
QT INTERVAL: 368 MS
QT INTERVAL: 436 MS
QTC INTERVAL: 412 MS
QTC INTERVAL: 462 MS
QTC INTERVAL: 506 MS
RBC # BLD AUTO: 1.8 MILLION/UL (ref 3.88–5.62)
RBC # BLD AUTO: 2.05 MILLION/UL (ref 3.88–5.62)
RBC #/AREA URNS AUTO: ABNORMAL /HPF
RBC MORPH BLD: PRESENT
RH BLD: POSITIVE
RH BLD: POSITIVE
SAO2 % BLD FROM PO2: 65 % (ref 60–85)
SARS-COV+SARS-COV-2 AG RESP QL IA.RAPID: NEGATIVE
SODIUM BLD-SCNC: 144 MMOL/L (ref 136–145)
SODIUM SERPL-SCNC: 136 MMOL/L (ref 135–147)
SODIUM SERPL-SCNC: 146 MMOL/L (ref 135–147)
SP GR UR STRIP.AUTO: 1.01 (ref 1–1.03)
SPECIMEN EXPIRATION DATE: NORMAL
SPECIMEN SOURCE: ABNORMAL
SPECIMEN SOURCE: ABNORMAL
T WAVE AXIS: -18 DEGREES
T WAVE AXIS: 31 DEGREES
T WAVE AXIS: 87 DEGREES
TPA PPP QL CHRO: 25 % OF NORMAL (ref 77–138)
TSH SERPL DL<=0.05 MIU/L-ACNC: 0.8 UIU/ML (ref 0.45–4.5)
UNIT DISPENSE STATUS: NORMAL
UNIT PRODUCT CODE: NORMAL
UNIT PRODUCT VOLUME: 125 ML
UNIT PRODUCT VOLUME: 206 ML
UNIT PRODUCT VOLUME: 241 ML
UNIT PRODUCT VOLUME: 280 ML
UNIT PRODUCT VOLUME: 330 ML
UNIT PRODUCT VOLUME: 337 ML
UNIT PRODUCT VOLUME: 350 ML
UNIT RH: NORMAL
UROBILINOGEN UR STRIP-ACNC: <2 MG/DL
VARIANT LYMPHS # BLD AUTO: 2 %
VENTRICULAR RATE: 100 BPM
VENTRICULAR RATE: 81 BPM
VENTRICULAR RATE: 95 BPM
WBC # BLD AUTO: 0.19 THOUSAND/UL (ref 4.31–10.16)
WBC # BLD AUTO: 0.23 THOUSAND/UL (ref 4.31–10.16)
WBC #/AREA URNS AUTO: ABNORMAL /HPF

## 2024-01-01 PROCEDURE — 84484 ASSAY OF TROPONIN QUANT: CPT

## 2024-01-01 PROCEDURE — 99285 EMERGENCY DEPT VISIT HI MDM: CPT

## 2024-01-01 PROCEDURE — 85049 AUTOMATED PLATELET COUNT: CPT | Performed by: NURSE PRACTITIONER

## 2024-01-01 PROCEDURE — 36556 INSERT NON-TUNNEL CV CATH: CPT | Performed by: NURSE PRACTITIONER

## 2024-01-01 PROCEDURE — 4A133J1 MONITORING OF ARTERIAL PULSE, PERIPHERAL, PERCUTANEOUS APPROACH: ICD-10-PCS | Performed by: INTERNAL MEDICINE

## 2024-01-01 PROCEDURE — 84145 PROCALCITONIN (PCT): CPT

## 2024-01-01 PROCEDURE — 94002 VENT MGMT INPAT INIT DAY: CPT

## 2024-01-01 PROCEDURE — 94644 CONT INHLJ TX 1ST HOUR: CPT

## 2024-01-01 PROCEDURE — 86901 BLOOD TYPING SEROLOGIC RH(D): CPT

## 2024-01-01 PROCEDURE — 30233N1 TRANSFUSION OF NONAUTOLOGOUS RED BLOOD CELLS INTO PERIPHERAL VEIN, PERCUTANEOUS APPROACH: ICD-10-PCS | Performed by: INTERNAL MEDICINE

## 2024-01-01 PROCEDURE — 86923 COMPATIBILITY TEST ELECTRIC: CPT

## 2024-01-01 PROCEDURE — 81001 URINALYSIS AUTO W/SCOPE: CPT

## 2024-01-01 PROCEDURE — 94760 N-INVAS EAR/PLS OXIMETRY 1: CPT

## 2024-01-01 PROCEDURE — 93005 ELECTROCARDIOGRAM TRACING: CPT

## 2024-01-01 PROCEDURE — 86920 COMPATIBILITY TEST SPIN: CPT

## 2024-01-01 PROCEDURE — 87804 INFLUENZA ASSAY W/OPTIC: CPT

## 2024-01-01 PROCEDURE — 85610 PROTHROMBIN TIME: CPT

## 2024-01-01 PROCEDURE — 85362 FIBRIN DEGRADATION PRODUCTS: CPT | Performed by: NURSE PRACTITIONER

## 2024-01-01 PROCEDURE — 85027 COMPLETE CBC AUTOMATED: CPT | Performed by: NURSE PRACTITIONER

## 2024-01-01 PROCEDURE — P9016 RBC LEUKOCYTES REDUCED: HCPCS

## 2024-01-01 PROCEDURE — 96361 HYDRATE IV INFUSION ADD-ON: CPT

## 2024-01-01 PROCEDURE — 99223 1ST HOSP IP/OBS HIGH 75: CPT | Performed by: NURSE PRACTITIONER

## 2024-01-01 PROCEDURE — 82948 REAGENT STRIP/BLOOD GLUCOSE: CPT

## 2024-01-01 PROCEDURE — 82550 ASSAY OF CK (CPK): CPT

## 2024-01-01 PROCEDURE — 4A133B1 MONITORING OF ARTERIAL PRESSURE, PERIPHERAL, PERCUTANEOUS APPROACH: ICD-10-PCS | Performed by: INTERNAL MEDICINE

## 2024-01-01 PROCEDURE — 82330 ASSAY OF CALCIUM: CPT

## 2024-01-01 PROCEDURE — P9037 PLATE PHERES LEUKOREDU IRRAD: HCPCS

## 2024-01-01 PROCEDURE — 92950 HEART/LUNG RESUSCITATION CPR: CPT

## 2024-01-01 PROCEDURE — 36415 COLL VENOUS BLD VENIPUNCTURE: CPT

## 2024-01-01 PROCEDURE — 93010 ELECTROCARDIOGRAM REPORT: CPT | Performed by: INTERNAL MEDICINE

## 2024-01-01 PROCEDURE — 70450 CT HEAD/BRAIN W/O DYE: CPT

## 2024-01-01 PROCEDURE — 85300 ANTITHROMBIN III ACTIVITY: CPT | Performed by: NURSE PRACTITIONER

## 2024-01-01 PROCEDURE — 82803 BLOOD GASES ANY COMBINATION: CPT

## 2024-01-01 PROCEDURE — 84100 ASSAY OF PHOSPHORUS: CPT | Performed by: NURSE PRACTITIONER

## 2024-01-01 PROCEDURE — P9040 RBC LEUKOREDUCED IRRADIATED: HCPCS

## 2024-01-01 PROCEDURE — 85379 FIBRIN DEGRADATION QUANT: CPT | Performed by: NURSE PRACTITIONER

## 2024-01-01 PROCEDURE — P9017 PLASMA 1 DONOR FRZ W/IN 8 HR: HCPCS

## 2024-01-01 PROCEDURE — 84295 ASSAY OF SERUM SODIUM: CPT

## 2024-01-01 PROCEDURE — 80053 COMPREHEN METABOLIC PANEL: CPT | Performed by: NURSE PRACTITIONER

## 2024-01-01 PROCEDURE — 86850 RBC ANTIBODY SCREEN: CPT

## 2024-01-01 PROCEDURE — 87811 SARS-COV-2 COVID19 W/OPTIC: CPT

## 2024-01-01 PROCEDURE — 83735 ASSAY OF MAGNESIUM: CPT | Performed by: NURSE PRACTITIONER

## 2024-01-01 PROCEDURE — 31500 INSERT EMERGENCY AIRWAY: CPT

## 2024-01-01 PROCEDURE — 04HY32Z INSERTION OF MONITORING DEVICE INTO LOWER ARTERY, PERCUTANEOUS APPROACH: ICD-10-PCS | Performed by: INTERNAL MEDICINE

## 2024-01-01 PROCEDURE — 96365 THER/PROPH/DIAG IV INF INIT: CPT

## 2024-01-01 PROCEDURE — 85014 HEMATOCRIT: CPT

## 2024-01-01 PROCEDURE — 83605 ASSAY OF LACTIC ACID: CPT | Performed by: NURSE PRACTITIONER

## 2024-01-01 PROCEDURE — 74176 CT ABD & PELVIS W/O CONTRAST: CPT

## 2024-01-01 PROCEDURE — 36620 INSERTION CATHETER ARTERY: CPT | Performed by: NURSE PRACTITIONER

## 2024-01-01 PROCEDURE — 82805 BLOOD GASES W/O2 SATURATION: CPT | Performed by: NURSE PRACTITIONER

## 2024-01-01 PROCEDURE — 80053 COMPREHEN METABOLIC PANEL: CPT

## 2024-01-01 PROCEDURE — 72125 CT NECK SPINE W/O DYE: CPT

## 2024-01-01 PROCEDURE — 71250 CT THORAX DX C-: CPT

## 2024-01-01 PROCEDURE — 85730 THROMBOPLASTIN TIME PARTIAL: CPT | Performed by: NURSE PRACTITIONER

## 2024-01-01 PROCEDURE — 82330 ASSAY OF CALCIUM: CPT | Performed by: NURSE PRACTITIONER

## 2024-01-01 PROCEDURE — 71045 X-RAY EXAM CHEST 1 VIEW: CPT

## 2024-01-01 PROCEDURE — 84443 ASSAY THYROID STIM HORMONE: CPT

## 2024-01-01 PROCEDURE — 02HV33Z INSERTION OF INFUSION DEVICE INTO SUPERIOR VENA CAVA, PERCUTANEOUS APPROACH: ICD-10-PCS | Performed by: INTERNAL MEDICINE

## 2024-01-01 PROCEDURE — 85610 PROTHROMBIN TIME: CPT | Performed by: NURSE PRACTITIONER

## 2024-01-01 PROCEDURE — 99291 CRITICAL CARE FIRST HOUR: CPT | Performed by: NURSE PRACTITIONER

## 2024-01-01 PROCEDURE — 82947 ASSAY GLUCOSE BLOOD QUANT: CPT

## 2024-01-01 PROCEDURE — 85007 BL SMEAR W/DIFF WBC COUNT: CPT

## 2024-01-01 PROCEDURE — 84484 ASSAY OF TROPONIN QUANT: CPT | Performed by: NURSE PRACTITIONER

## 2024-01-01 PROCEDURE — 85027 COMPLETE CBC AUTOMATED: CPT

## 2024-01-01 PROCEDURE — NC001 PR NO CHARGE: Performed by: NURSE PRACTITIONER

## 2024-01-01 PROCEDURE — 85420 FIBRINOLYTIC PLASMINOGEN: CPT | Performed by: NURSE PRACTITIONER

## 2024-01-01 PROCEDURE — 84132 ASSAY OF SERUM POTASSIUM: CPT

## 2024-01-01 PROCEDURE — 85730 THROMBOPLASTIN TIME PARTIAL: CPT

## 2024-01-01 PROCEDURE — 85384 FIBRINOGEN ACTIVITY: CPT | Performed by: NURSE PRACTITIONER

## 2024-01-01 PROCEDURE — 5A1935Z RESPIRATORY VENTILATION, LESS THAN 24 CONSECUTIVE HOURS: ICD-10-PCS | Performed by: INTERNAL MEDICINE

## 2024-01-01 PROCEDURE — 30233R1 TRANSFUSION OF NONAUTOLOGOUS PLATELETS INTO PERIPHERAL VEIN, PERCUTANEOUS APPROACH: ICD-10-PCS | Performed by: INTERNAL MEDICINE

## 2024-01-01 PROCEDURE — P9012 CRYOPRECIPITATE EACH UNIT: HCPCS

## 2024-01-01 PROCEDURE — 87077 CULTURE AEROBIC IDENTIFY: CPT

## 2024-01-01 PROCEDURE — 86900 BLOOD TYPING SEROLOGIC ABO: CPT

## 2024-01-01 PROCEDURE — 87040 BLOOD CULTURE FOR BACTERIA: CPT

## 2024-01-01 PROCEDURE — 83605 ASSAY OF LACTIC ACID: CPT

## 2024-01-01 RX ORDER — ACYCLOVIR 800 MG/1
400 TABLET ORAL 2 TIMES DAILY
Status: DISCONTINUED | OUTPATIENT
Start: 2024-01-01 | End: 2024-01-01 | Stop reason: HOSPADM

## 2024-01-01 RX ORDER — CALCIUM CHLORIDE 100 MG/ML
1 INJECTION INTRAVENOUS; INTRAVENTRICULAR ONCE
Status: COMPLETED | OUTPATIENT
Start: 2024-01-01 | End: 2024-01-01

## 2024-01-01 RX ORDER — PANTOPRAZOLE SODIUM 40 MG/1
40 TABLET, DELAYED RELEASE ORAL
Status: DISCONTINUED | OUTPATIENT
Start: 2024-01-01 | End: 2024-01-01

## 2024-01-01 RX ORDER — PANTOPRAZOLE SODIUM 40 MG/10ML
40 INJECTION, POWDER, LYOPHILIZED, FOR SOLUTION INTRAVENOUS EVERY 12 HOURS SCHEDULED
Status: DISCONTINUED | OUTPATIENT
Start: 2024-01-01 | End: 2024-01-01 | Stop reason: HOSPADM

## 2024-01-01 RX ORDER — EPINEPHRINE 0.1 MG/ML
INJECTION INTRAVENOUS CODE/TRAUMA/SEDATION MEDICATION
Status: COMPLETED | OUTPATIENT
Start: 2024-01-01 | End: 2024-01-01

## 2024-01-01 RX ORDER — MORPHINE SULFATE 15 MG/1
15 TABLET, FILM COATED, EXTENDED RELEASE ORAL EVERY 12 HOURS
Status: DISCONTINUED | OUTPATIENT
Start: 2024-01-01 | End: 2024-01-01 | Stop reason: HOSPADM

## 2024-01-01 RX ORDER — VANCOMYCIN HYDROCHLORIDE 500 MG/100ML
10 INJECTION, SOLUTION INTRAVENOUS DAILY PRN
Status: DISCONTINUED | OUTPATIENT
Start: 2024-01-01 | End: 2024-01-01

## 2024-01-01 RX ORDER — ALBUMIN (HUMAN) 12.5 G/50ML
50 SOLUTION INTRAVENOUS ONCE
Status: COMPLETED | OUTPATIENT
Start: 2024-01-01 | End: 2024-01-01

## 2024-01-01 RX ORDER — ACETAMINOPHEN 650 MG/1
650 SUPPOSITORY RECTAL ONCE
Status: COMPLETED | OUTPATIENT
Start: 2024-01-01 | End: 2024-01-01

## 2024-01-01 RX ORDER — SODIUM CHLORIDE, SODIUM LACTATE, POTASSIUM CHLORIDE, CALCIUM CHLORIDE 600; 310; 30; 20 MG/100ML; MG/100ML; MG/100ML; MG/100ML
75 INJECTION, SOLUTION INTRAVENOUS CONTINUOUS
Status: DISCONTINUED | OUTPATIENT
Start: 2024-01-01 | End: 2024-01-01 | Stop reason: HOSPADM

## 2024-01-01 RX ORDER — CHLORHEXIDINE GLUCONATE ORAL RINSE 1.2 MG/ML
15 SOLUTION DENTAL EVERY 12 HOURS SCHEDULED
Status: DISCONTINUED | OUTPATIENT
Start: 2024-01-01 | End: 2024-01-01 | Stop reason: HOSPADM

## 2024-01-01 RX ADMIN — CHLORHEXIDINE GLUCONATE 15 ML: 1.2 RINSE ORAL at 00:52

## 2024-01-01 RX ADMIN — SODIUM CHLORIDE 1000 ML: 0.9 INJECTION, SOLUTION INTRAVENOUS at 19:22

## 2024-01-01 RX ADMIN — PHENYLEPHRINE HYDROCHLORIDE 150 MCG/MIN: 50 INJECTION INTRAVENOUS at 03:33

## 2024-01-01 RX ADMIN — SODIUM CHLORIDE 1000 ML: 0.9 INJECTION, SOLUTION INTRAVENOUS at 20:07

## 2024-01-01 RX ADMIN — SODIUM CHLORIDE, SODIUM LACTATE, POTASSIUM CHLORIDE, AND CALCIUM CHLORIDE 75 ML/HR: .6; .31; .03; .02 INJECTION, SOLUTION INTRAVENOUS at 00:52

## 2024-01-01 RX ADMIN — ALBUMIN (HUMAN) 50 G: 0.25 INJECTION, SOLUTION INTRAVENOUS at 00:10

## 2024-01-01 RX ADMIN — SODIUM BICARBONATE 50 MEQ: 84 INJECTION, SOLUTION INTRAVENOUS at 02:08

## 2024-01-01 RX ADMIN — EPINEPHRINE 1 MG: 0.1 INJECTION INTRAVENOUS at 02:05

## 2024-01-01 RX ADMIN — EPOPROSTENOL 50 NG/KG/MIN: 1.5 INJECTION, POWDER, LYOPHILIZED, FOR SOLUTION INTRAVENOUS at 04:16

## 2024-01-01 RX ADMIN — ACETAMINOPHEN 650 MG: 650 SUPPOSITORY RECTAL at 19:36

## 2024-01-01 RX ADMIN — SODIUM BICARBONATE 50 MEQ: 84 INJECTION INTRAVENOUS at 02:43

## 2024-01-01 RX ADMIN — EPINEPHRINE 1 MG: 0.1 INJECTION INTRAVENOUS at 02:08

## 2024-01-01 RX ADMIN — VANCOMYCIN HYDROCHLORIDE 1500 MG: 5 INJECTION, POWDER, LYOPHILIZED, FOR SOLUTION INTRAVENOUS at 22:42

## 2024-01-01 RX ADMIN — CALCIUM CHLORIDE 1 G: 100 INJECTION INTRAVENOUS; INTRAVENTRICULAR at 02:31

## 2024-01-01 RX ADMIN — NOREPINEPHRINE BITARTRATE 30 MCG/MIN: 1 INJECTION, SOLUTION, CONCENTRATE INTRAVENOUS at 02:07

## 2024-01-01 RX ADMIN — NOREPINEPHRINE BITARTRATE 5 MCG/MIN: 1 INJECTION, SOLUTION, CONCENTRATE INTRAVENOUS at 00:52

## 2024-01-01 RX ADMIN — SODIUM BICARBONATE 100 MEQ: 84 INJECTION INTRAVENOUS at 00:49

## 2024-01-01 RX ADMIN — CEFEPIME 2000 MG: 2 INJECTION, POWDER, FOR SOLUTION INTRAVENOUS at 21:16

## 2024-01-01 RX ADMIN — Medication 100 MEQ: at 00:49

## 2024-01-01 RX ADMIN — VASOPRESSIN 0.04 UNITS/MIN: 20 INJECTION INTRAVENOUS at 02:05

## 2024-01-01 RX ADMIN — NOREPINEPHRINE BITARTRATE 50 MCG/MIN: 1 INJECTION, SOLUTION, CONCENTRATE INTRAVENOUS at 03:32

## 2024-01-01 RX ADMIN — EPINEPHRINE 1 MG: 0.1 INJECTION INTRAVENOUS at 02:01

## 2024-01-01 RX ADMIN — EPINEPHRINE 1 MCG/MIN: 1 INJECTION, SOLUTION, CONCENTRATE INTRAVENOUS at 02:16

## 2024-01-01 RX ADMIN — SODIUM BICARBONATE 100 ML/HR: 84 INJECTION, SOLUTION INTRAVENOUS at 00:56

## 2024-01-01 RX ADMIN — SODIUM CHLORIDE 500 ML: 0.9 INJECTION, SOLUTION INTRAVENOUS at 20:52

## 2024-01-01 RX ADMIN — CEFTRIAXONE 2000 MG: 1 INJECTION, POWDER, FOR SOLUTION INTRAMUSCULAR; INTRAVENOUS at 19:38

## 2024-10-20 PROBLEM — J96.01 SEPSIS WITH ACUTE HYPOXIC RESPIRATORY FAILURE AND SEPTIC SHOCK (HCC): Status: ACTIVE | Noted: 2024-01-01

## 2024-10-20 PROBLEM — D70.9 NEUTROPENIC FEVER  (HCC): Status: ACTIVE | Noted: 2024-01-01

## 2024-10-20 PROBLEM — R65.21 SEPSIS WITH ACUTE HYPOXIC RESPIRATORY FAILURE AND SEPTIC SHOCK (HCC): Status: ACTIVE | Noted: 2024-01-01

## 2024-10-20 PROBLEM — D61.818 PANCYTOPENIA (HCC): Status: ACTIVE | Noted: 2022-01-19

## 2024-10-20 PROBLEM — R50.81 NEUTROPENIC FEVER  (HCC): Status: ACTIVE | Noted: 2024-01-01

## 2024-10-20 PROBLEM — A49.02 MRSA (METHICILLIN RESISTANT STAPHYLOCOCCUS AUREUS): Status: ACTIVE | Noted: 2024-01-01

## 2024-10-20 PROBLEM — A41.9 SEPSIS WITH ACUTE HYPOXIC RESPIRATORY FAILURE AND SEPTIC SHOCK (HCC): Status: ACTIVE | Noted: 2024-01-01

## 2024-10-20 NOTE — ED NOTES
Pt placed in soft restraints at this time after attempting to remove urinary catheter. Provider aware and notified.     Navya Berg RN  10/20/24 1956

## 2024-10-20 NOTE — SEPSIS NOTE
Sepsis Note   Kash Solano 84 y.o. male MRN: [id number]  Unit/Bed#: ED-23 Encounter: [id number]       Initial Sepsis Screening       Row Name 10/20/24 1923                Is the patient's history suggestive of a new or worsening infection? Yes (Proceed)  -JM        Suspected source of infection suspect infection, source unknown  -JM        Indicate SIRS criteria Hyperthemia > 38.3C (100.9F) OR Hypothermia <36C (96.8F);Tachycardia > 90 bpm;Tachypnea > 20 resp per min  -JM        Are two or more of the above signs & symptoms of infection both present and new to the patient? Yes (Proceed)  -JM        Assess for evidence of organ dysfunction: Are any of the below criteria present within 6 hours of suspected infection and SIRS criteria that are NOT considered to be chronic conditions? --                  User Key  (r) = Recorded By, (t) = Taken By, (c) = Cosigned By      Initials Name Provider Type    TIMOTHY Marin PA-C Physician Assistant                        There is no height or weight on file to calculate BMI.  Wt Readings from Last 1 Encounters:   05/05/21 82.1 kg (181 lb 1.6 oz)        Patient weight not recorded

## 2024-10-20 NOTE — ED PROVIDER NOTES
Time reflects when diagnosis was documented in both MDM as applicable and the Disposition within this note       Time User Action Codes Description Comment    10/20/2024  9:54 PM Debi Javier Add [D70.9,  R50.81] Neutropenic fever  (HCC)     10/20/2024  9:58 PM Sonali Marinica Add [A41.9,  R65.20] Severe sepsis (HCC)     10/20/2024  9:58 PM Sonali Marinica Add [R41.82] Altered mental status     10/20/2024  9:59 PM Farhad Marinssica Add [D70.9] Severe neutropenia (HCC)     10/20/2024  9:59 PM Farhad Marinssica Add [D64.9] Anemia     10/20/2024  9:59 PM Farhad Marinssica Add [N17.9] MELANIE (acute kidney injury) (HCC)     10/20/2024  9:59 PM Annie Marin Add [R79.89] Elevated troponin     10/20/2024 10:00 PM Annie Marin Add [J18.0] Bronchopneumonia     10/20/2024 10:16 PM Tito Braswell Modify [D70.9,  R50.81] Neutropenic fever  (HCC)     10/20/2024 10:16 PM Tito Braswell Add [A49.02] MRSA (methicillin resistant Staphylococcus aureus)     10/20/2024 10:41 PM Debi Javier Add [A41.9,  R65.21,  J96.01] Sepsis with acute hypoxic respiratory failure and septic shock ([facility])           ED Disposition       ED Disposition   Admit    Condition   Stable    Date/Time   Sun Oct 20, 2024 10:00 PM    Comment   Case was discussed with critical care and the patient's admission status was agreed to be inpatient to the service of Dr. Arevalo.               Assessment & Plan       Medical Decision Making  DDx including but not limited to: sepsis, UTI, pneumonia, viral illness, cellulitis, sinusitis  Will obtain EKG, troponin to evaluate for ACS.  Will obtain chest x-ray to evaluate for cardiopulmonary abnormality including pneumonia, pneumothorax.  Will obtain CBC to evaluate for leukocytosis, anemia.  Will obtain CMP to evaluate kidney function, for electrolyte disturbance.  Will obtain coags.  Will obtain procalcitonin, lactic acid, blood cultures given SIRS criteria.  Will obtain COVID/flu testing.  Will obtain UA  to evaluate for UTI. Given significant fever, will have RN place temp peter. Will obtain pan scan given SIRS, patient found on ground.    Cr elevated compared to baseline. GFR decreased. EKG with nonspecific changes, initial troponin 85. No STEMI. Procalcitonin, Lactic acid elevated.Sepsis alert called. Patient is receiving IVFB at this time.    After 2 L of NS, patient developed evidence of fluid overload on exam.  Rales present, patient with increased work of breathing.  IVF stopped, respiratory called to bedside for BiPAP.  Patient with improvement of work of breathing on BiPAP.    Patient with significant pancytopenia. ANC 0. Will add on Cefepime. Hgb 6.2. Consent obtained for transfusion over the phone.  I discussed with the patient's family that he will be admitted to the ICU.  CT findings consistent with bronchopneumonia.  Case discussed with critical care APDebi who evaluated patient at bedside and accepts for admission to ICU.      Problems Addressed:  MELANIE (acute kidney injury) (HCC): acute illness or injury  Altered mental status: acute illness or injury  Anemia: acute illness or injury  Bronchopneumonia: acute illness or injury  Elevated troponin: acute illness or injury  Severe neutropenia (HCC): acute illness or injury  Severe sepsis (HCC): acute illness or injury    Amount and/or Complexity of Data Reviewed  Labs: ordered. Decision-making details documented in ED Course.  Radiology: ordered. Decision-making details documented in ED Course.  ECG/medicine tests:  Decision-making details documented in ED Course.    Risk  OTC drugs.  Decision regarding hospitalization.        ED Course as of 10/21/24 0328   Sun Oct 20, 2024   1922 EKG: NSR at 95 BPM, QTc 462, normal axis, nonspecific ST and T wave abnormality, no STEMI as interpreted by me    1946 Patient's family at bedside.  They report the patient's granddaughter found the patient lying on the ground.  They state that today, he was not eating or  drinking anything.  His visiting nurse thought that he might have a fever yesterday.  He has otherwise been without vomiting, diarrhea, dyspnea.  He is full code per family   2009 BUN(!): 34   2009 Creatinine(!): 2.22  Last 1.4 on 10/4/24; increased, suspect MELANIE    2009 GFR, Calculated: 26  47 on 10/4/24; decreased   2010 hs TnI 0hr(!): 85   2011 Procalcitonin(!): 78.53   2013 LACTIC ACID(!!): 4.6  IVF being given    2014 Blood Pressure: 107/50  Improved   2030 Bacteria, UA: Occasional   2030 Leukocytes, UA: Negative   2030 Nitrite, UA: Negative   2056 Blood Pressure: 117/67   2056 WBC(!): 0.23   2056 Hemoglobin(!): 6.2   2102 Absolute Neutrophils(!): 0.00  Cefepime ordered   2111 On re-examination, patient has developed rales and increased WOB. BiPAP ordered. The 30ml/kg fluid bolus was not given to the patient despite hypotension and/or significantly elevated lactate of = 4 and/or presence of septic shock due to: Concern for fluid/volume overload. The patient will be administered 2L of crystalloid fluid instead. Orders for this have been placed in [facility]. The patient may receive additional colloid or crystalloid fluids thereafter based on clinical condition.     Annie Marin PA-C     2137 Case discussed with patient's Grand daughter, Quyen. Discussed that patient is now on BiPAP, will be admitted to ICU. Discussed Hgb of 6.2 and recommendation of blood transfusion. Risks and benefits discussed. She verbalized understanding and agreement with plan for transfusion    2140 ECG 12 lead  Repeat EKG atrial fibrillation at 100 bpm, normal axis, nonspecific T wave changes, , no STEMI as interpreted by me   2158 CT chest abdomen pelvis wo contrast     IMPRESSION:     Although the study was limited by the lack of intravenous contrast, there is no gross evidence of solid organ injury.     Large right lower lobe consolidation with air bronchograms most compatible with bronchopneumonia, possibly aspiration  pneumonia. Small consolidation with air bronchograms is also noted at the medial left lung base. Multifocal patchy groundglass densities   are noted throughout both lungs, right greater than left, also suspicious for an infectious or inflammatory process.     2159 LACTIC ACID(!!): 6.0       Medications   cefepime (MAXIPIME) 1 g/50 mL dextrose IVPB (has no administration in time range)   sodium chloride 0.9 % bolus 1,000 mL (0 mL Intravenous Stopped 10/20/24 2041)     Followed by   sodium chloride 0.9 % bolus 1,000 mL (0 mL Intravenous Stopped 10/20/24 2041)   ceftriaxone (ROCEPHIN) 2 g/50 mL in dextrose IVPB (0 mg Intravenous Stopped 10/20/24 2015)   acetaminophen (TYLENOL) rectal suppository 650 mg (650 mg Rectal Given 10/20/24 1936)   sodium chloride 0.9 % bolus 500 mL (0 mL Intravenous Stopped 10/20/24 2107)   cefepime (MAXIPIME) 2 g/50 mL dextrose IVPB (0 mg Intravenous Stopped 10/20/24 2144)   albumin human (FLEXBUMIN) 25 % injection 50 g (has no administration in time range)       ED Risk Strat Scores           SBIRT 22yo+      Flowsheet Row Most Recent Value   Initial Alcohol Screen: US AUDIT-C     1. How often do you have a drink containing alcohol? 0 Filed at: 10/20/2024 1914   2. How many drinks containing alcohol do you have on a typical day you are drinking?  0 Filed at: 10/20/2024 1914   3a. Male UNDER 65: How often do you have five or more drinks on one occasion? 0 Filed at: 10/20/2024 1914   3b. FEMALE Any Age, or MALE 65+: How often do you have 4 or more drinks on one occassion? 0 Filed at: 10/20/2024 2013   Audit-C Score 0 Filed at: 10/20/2024 2013   JOSH: How many times in the past year have you...    Used an illegal drug or used a prescription medication for non-medical reasons? Never Filed at: 10/20/2024 1914                            History of Present Illness       Chief Complaint   Patient presents with    Altered Mental Status     BIBA- family states pt found this AM with AMS. Pt with  tympanic temp of 105.8 and responsive to painful stimuli. Hx Bone Cancer       Past Medical History:   Diagnosis Date    Bone cancer (HCC)     Hiatal hernia with GERD     History of chemotherapy     Pt goes for treatments monthly and has been on chemo tx for 5 years.    Hypertension     Iron deficiency anemia     Multiple myeloma (HCC)     Neutropenia (HCC)     Systolic murmur       History reviewed. No pertinent surgical history.   History reviewed. No pertinent family history.   Social History     Tobacco Use    Smoking status: Never    Smokeless tobacco: Never   Vaping Use    Vaping status: Never Used   Substance Use Topics    Alcohol use: Not Currently    Drug use: Never      E-Cigarette/Vaping    E-Cigarette Use Never User       E-Cigarette/Vaping Substances    Nicotine No     THC No     CBD No     Flavoring No     Other No     Unknown No       I have reviewed and agree with the history as documented.     Kash Solano is a right hand dominant 84 y.o. male with history of multiple myeloma, anemia, HTN, cataract, GERD, R hip fracture in 9/2024 who presents to the ED for evaluation of fever and altered mental status. Patient is a poor historian due to AMS.        Review of Systems   Unable to perform ROS: Mental status change   Constitutional:  Positive for fever.           Objective       ED Triage Vitals   Temperature Pulse Blood Pressure Respirations SpO2 Patient Position - Orthostatic VS   10/20/24 1913 10/20/24 1912 10/20/24 1915 10/20/24 1912 10/20/24 1912 10/20/24 1915   (!) 104.4 °F (40.2 °C) 100 (!) 71/35 (!) 28 (!) 84 % Lying      Temp Source Heart Rate Source BP Location FiO2 (%) Pain Score    10/20/24 1913 10/20/24 1912 10/20/24 1915 -- 10/20/24 1936    Rectal Monitor Left arm  Med Not Given for Pain - for MAR use only      Vitals      Date and Time Temp Pulse SpO2 Resp BP Pain Score FACES Pain Rating User   10/21/24 0245 -- 86 87 % 19 -- -- -- AW   10/21/24 0238 -- 92 -- 8 -- -- -- AW   10/21/24 0235  -- 88 -- 14 -- -- -- AW   10/21/24 0233 96.8 °F (36 °C) 91 -- 12 74/26 -- -- AW   10/21/24 0225 -- 82 -- 19 -- -- -- AW   10/21/24 0224 -- 80 -- 24 -- -- -- RB   10/21/24 0221 -- 84 Simultaneous filing. User may not have seen previous data. -- 23 Simultaneous filing. User may not have seen previous data. -- -- -- AW   10/21/24 0218 -- 88 -- 21 -- -- -- RB   10/21/24 0215 -- -- -- -- 100/58 -- -- RB   10/21/24 0215 -- 94 -- 21 -- -- -- RB   10/21/24 0214 -- -- -- -- 113/57 -- -- RB   10/21/24 0213 -- 94 -- 15 127/75 -- -- AW   10/21/24 0212 -- 94 -- 11 -- -- -- RB   10/21/24 0211 -- -- -- -- 160/54 -- -- RB   10/21/24 0211 -- -- -- -- 175/60 -- -- RB   10/21/24 0210 -- -- -- -- 155/60 -- -- RB   10/21/24 0209 -- 106 -- 115 -- -- -- RB   10/21/24 0206 -- 88 -- 75 -- -- -- RB   10/21/24 0205 -- 103 -- -- -- -- -- AW   10/21/24 0203 -- 126 66 % 126 -- -- -- RB   10/21/24 0155 96.4 °F (35.8 °C) 60 42 % 10 56/32 -- -- AW   10/21/24 0140 96.8 °F (36 °C) 90 64 % 18 83/62 -- -- AW   10/21/24 0135 96.8 °F (36 °C) 76 62 % 21 65/45 -- -- AW   10/21/24 0130 96.6 °F (35.9 °C) 92 61 % 28 75/43 -- -- AW   10/21/24 0115 96.4 °F (35.8 °C) 98 65 % 25 78/43 -- -- AW   10/21/24 0109 -- 91 -- -- 80/37 -- -- AW   10/21/24 0033 97.5 °F (36.4 °C) 82 89 % 33 105/39 -- -- RB   10/21/24 0005 97.9 °F (36.6 °C) 98 70 % 31 92/36 -- -- RB   10/21/24 0003 -- 92 50 % 43 68/42 -- -- RB   10/20/24 2353 -- -- -- -- 71/36 -- -- RB   10/20/24 2351 -- -- -- -- 62/38 -- -- RB   10/20/24 2329 -- 96 90 % 28  90/49 provider made aware -- -- MM   10/20/24 2315 98.2 °F (36.8 °C) 82 89 % 30 96/48 -- -- MM   10/20/24 2300 98.2 °F (36.8 °C) 84 90 % -- 96/48 -- -- MM   10/20/24 2245 98.4 °F (36.9 °C) 101 89 % 28 100/57 -- -- MM   10/20/24 2230 98.6 °F (37 °C) 90 94 % 28 102/54 -- -- MM   10/20/24 2215 98.6 °F (37 °C) 92 99 % 28 98/57 -- -- MM   10/20/24 2200 98.6 °F (37 °C) 100 95 % 26 108/90 -- -- MM   10/20/24 2140 99.1 °F (37.3 °C) 96 93 % 28 134/94 -- -- MM    10/20/24 2129 99.3 °F (37.4 °C) 97 94 % 28 106/52 -- -- MM   10/20/24 2113 99.7 °F (37.6 °C) 99 98 % 30 102/57 -- -- MM   10/20/24 2106 -- -- 90 % -- -- -- -- ES   10/20/24 2049 100 °F (37.8 °C) 109 91 % 26 117/67 -- -- MM   10/20/24 2018 -- 108 97 % 26 94/57 -- -- MM   10/20/24 2014 -- 110 91 % 26 93/52 -- -- MM   10/20/24 2000 -- 109 94 % 26 107/50 -- -- MM   10/20/24 1959 -- 113 93 % 24 87/40 -- -- MM   10/20/24 1956 -- 114 92 % 24 110/59 -- -- MM   10/20/24 1945 -- 112 92 % 26 100/48 -- -- MM   10/20/24 1936 -- -- -- -- -- Med Not Given for Pain - for MAR use only -- MM   10/20/24 1930 -- 112 92 % 26 118/60 -- -- MM   10/20/24 1916 -- -- 99 % -- -- -- -- MM   10/20/24 1915 -- -- -- -- 71/35 -- -- MM   10/20/24 1913 104.4 °F (40.2 °C) -- -- -- -- -- -- MM   10/20/24 1912 -- 100 84 % 28 -- -- -- MM            Physical Exam  Vitals and nursing note reviewed.   Constitutional:       General: He is in acute distress.      Appearance: He is well-developed. He is ill-appearing.   HENT:      Head: Normocephalic and atraumatic.      Mouth/Throat:      Mouth: Mucous membranes are dry.   Eyes:      Conjunctiva/sclera: Conjunctivae normal.   Cardiovascular:      Rate and Rhythm: Regular rhythm. Tachycardia present.      Heart sounds: No murmur heard.  Pulmonary:      Effort: Tachypnea and respiratory distress present.      Breath sounds: Decreased air movement present. Rhonchi present. No wheezing or rales.   Abdominal:      Palpations: Abdomen is soft.      Tenderness: There is no abdominal tenderness. There is no guarding or rebound.   Musculoskeletal:         General: No swelling.      Cervical back: Neck supple.   Skin:     General: Skin is warm and dry.      Capillary Refill: Capillary refill takes less than 2 seconds.   Neurological:      GCS: GCS eye subscore is 4. GCS verbal subscore is 2. GCS motor subscore is 5.         Results Reviewed       Procedure Component Value Units Date/Time    Blood culture #1  [[id number]] Collected: 10/20/24 1932    Lab Status: Preliminary result Specimen: Blood from Arm, Left Updated: 10/21/24 0301     Blood Culture Received in Microbiology Lab. Culture in Progress.    Blood culture #2 [[id number]] Collected: 10/20/24 1931    Lab Status: Preliminary result Specimen: Blood from Arm, Left Updated: 10/21/24 0301     Blood Culture Received in Microbiology Lab. Culture in Progress.    HS Troponin I 4hr [[id number]]  (Abnormal) Collected: 10/20/24 2339    Lab Status: Final result Specimen: Blood from Arm, Right Updated: 10/21/24 0005     hs TnI 4hr 159 ng/L      Delta 4hr hsTnI 74 ng/L     CK [[id number]]  (Normal) Collected: 10/20/24 1925    Lab Status: Final result Specimen: Blood from Arm, Left Updated: 10/20/24 2204     Total  U/L     HS Troponin I 2hr [[id number]]  (Abnormal) Collected: 10/20/24 2127    Lab Status: Final result Specimen: Blood from Arm, Right Updated: 10/20/24 2201     hs TnI 2hr 143 ng/L      Delta 2hr hsTnI 58 ng/L     Lactic acid 2 Hours [[id number]]  (Abnormal) Collected: 10/20/24 2127    Lab Status: Final result Specimen: Blood from Arm, Right Updated: 10/20/24 2158     LACTIC ACID 6.0 mmol/L     Narrative:      Result may be elevated if tourniquet was used during collection.    RBC Morphology Reflex Test [[id number]] Collected: 10/20/24 1925    Lab Status: Final result Specimen: Blood from Arm, Left Updated: 10/20/24 2101    CBC and differential [[id number]]  (Abnormal) Collected: 10/20/24 1925    Lab Status: Final result Specimen: Blood from Arm, Left Updated: 10/20/24 2044     WBC 0.23 Thousand/uL      RBC 1.80 Million/uL      Hemoglobin 6.2 g/dL      Hematocrit 19.2 %       fL      MCH 34.4 pg      MCHC 32.3 g/dL      RDW 17.1 %      MPV 11.2 fL      Platelets 81 Thousands/uL     Manual Differential(PHLEBS Do Not Order) [[id number]]  (Abnormal) Collected: 10/20/24 1925    Lab Status: Final result Specimen: Blood from Arm, Left Updated: 10/20/24 2044      Segmented % 2 %      Lymphocytes % 81 %      Monocytes % 14 %      Eosinophils % 0 %      Basophils % 1 %      Atypical Lymphocytes % 2 %      Absolute Neutrophils 0.00 Thousand/uL      Absolute Lymphocytes 0.19 Thousand/uL      Absolute Monocytes 0.03 Thousand/uL      Absolute Eosinophils 0.00 Thousand/uL      Absolute Basophils 0.00 Thousand/uL      Total Counted --     nRBC 5 /100 WBC      RBC Morphology Present     Platelet Estimate Decreased     Poikilocytes Present    Urine Microscopic [[id number]]  (Abnormal) Collected: 10/20/24 2000    Lab Status: Final result Specimen: Urine, Indwelling Valentin Catheter Updated: 10/20/24 2028     RBC, UA None Seen /hpf      WBC, UA 1-2 /hpf      Epithelial Cells Occasional /hpf      Bacteria, UA Occasional /hpf      MUCUS THREADS Moderate     Hyaline Casts, UA Innumerable /lpf     UA w Reflex to Microscopic w Reflex to Culture [[id number]]  (Abnormal) Collected: 10/20/24 2000    Lab Status: Final result Specimen: Urine, Indwelling Valentin Catheter Updated: 10/20/24 2020     Color, UA Yellow     Clarity, UA Clear     Specific Gravity, UA 1.015     pH, UA 5.5     Leukocytes, UA Negative     Nitrite, UA Negative     Protein, UA 70 (1+) mg/dl      Glucose, UA Negative mg/dl      Ketones, UA Negative mg/dl      Urobilinogen, UA <2.0 mg/dl      Bilirubin, UA Negative     Occult Blood, UA Small    TSH, 3rd generation with Free T4 reflex [[id number]]  (Normal) Collected: 10/20/24 1925    Lab Status: Final result Specimen: Blood from Arm, Left Updated: 10/20/24 2018     TSH 3RD GENERATON 0.799 uIU/mL     Lactic acid [[id number]]  (Abnormal) Collected: 10/20/24 1925    Lab Status: Final result Specimen: Blood from Arm, Left Updated: 10/20/24 2013     LACTIC ACID 4.6 mmol/L     Narrative:      Result may be elevated if tourniquet was used during collection.    Procalcitonin [[id number]]  (Abnormal) Collected: 10/20/24 1925    Lab Status: Final result Specimen: Blood from Arm, Left Updated:  10/20/24 2011     Procalcitonin 78.53 ng/ml     HS Troponin 0hr (reflex protocol) [[id number]]  (Abnormal) Collected: 10/20/24 1925    Lab Status: Final result Specimen: Blood from Arm, Left Updated: 10/20/24 2009     hs TnI 0hr 85 ng/L     Comprehensive metabolic panel [[id number]]  (Abnormal) Collected: 10/20/24 1925    Lab Status: Final result Specimen: Blood from Arm, Left Updated: 10/20/24 2006     Sodium 136 mmol/L      Potassium 3.2 mmol/L      Chloride 110 mmol/L      CO2 19 mmol/L      ANION GAP 7 mmol/L      BUN 34 mg/dL      Creatinine 2.22 mg/dL      Glucose 76 mg/dL      Calcium 7.6 mg/dL      Corrected Calcium 9.0 mg/dL      AST 16 U/L      ALT 6 U/L      Alkaline Phosphatase 40 U/L      Total Protein 3.7 g/dL      Albumin 2.3 g/dL      Total Bilirubin 0.67 mg/dL      eGFR 26 ml/min/1.73sq m     Narrative:      National Kidney Disease Foundation guidelines for Chronic Kidney Disease (CKD):     Stage 1 with normal or high GFR (GFR > 90 mL/min/1.73 square meters)    Stage 2 Mild CKD (GFR = 60-89 mL/min/1.73 square meters)    Stage 3A Moderate CKD (GFR = 45-59 mL/min/1.73 square meters)    Stage 3B Moderate CKD (GFR = 30-44 mL/min/1.73 square meters)    Stage 4 Severe CKD (GFR = 15-29 mL/min/1.73 square meters)    Stage 5 End Stage CKD (GFR <15 mL/min/1.73 square meters)  Note: GFR calculation is accurate only with a steady state creatinine    FLU/COVID Rapid Antigen (30 min. TAT) - Preferred screening test in ED [[id number]]  (Normal) Collected: 10/20/24 1925    Lab Status: Final result Specimen: Nares from Nose Updated: 10/20/24 1958     SARS COV Rapid Antigen Negative     Influenza A Rapid Antigen Negative     Influenza B Rapid Antigen Negative    Narrative:      This test has been performed using the [employer] Jacque 2 FLU+SARS Antigen test under the Emergency Use Authorization (EUA). This test has been validated by the  and verified by the performing laboratory. The Jacque uses lateral flow  immunofluorescent sandwich assay to detect SARS-COV, Influenza A and Influenza B Antigen.     The Quidel Jacque 2 SARS Antigen test does not differentiate between SARS-CoV and SARS-CoV-2.     Negative results are presumptive and may be confirmed with a molecular assay, if necessary, for patient management. Negative results do not rule out SARS-CoV-2 or influenza infection and should not be used as the sole basis for treatment or patient management decisions. A negative test result may occur if the level of antigen in a sample is below the limit of detection of this test.     Positive results are indicative of the presence of viral antigens, but do not rule out bacterial infection or co-infection with other viruses.     All test results should be used as an adjunct to clinical observations and other information available to the provider.    FOR PEDIATRIC PATIENTS - copy/paste COVID Guidelines URL to browser: [email or web address]    Protime-INR [[id number]]  (Abnormal) Collected: 10/20/24 1925    Lab Status: Final result Specimen: Blood from Arm, Left Updated: 10/20/24 1956     Protime 19.5 seconds      INR 1.64    Narrative:      INR Therapeutic Range    Indication                                             INR Range      Atrial Fibrillation                                               2.0-3.0  Hypercoagulable State                                    2.0.2.3  Left Ventricular Asist Device                            2.0-3.0  Mechanical Heart Valve                                  -    Aortic(with afib, MI, embolism, HF, LA enlargement,    and/or coagulopathy)                                     2.0-3.0 (2.5-3.5)     Mitral                                                             2.5-3.5  Prosthetic/Bioprosthetic Heart Valve               2.0-3.0  Venous thromboembolism (VTE: VT, PE        2.0-3.0    APTT [[id number]]  (Normal) Collected: 10/20/24 1925    Lab  Status: Final result Specimen: Blood from Arm, Left Updated: 10/20/24 1956     PTT 32 seconds     Fingerstick Glucose (POCT) [[id number]]  (Normal) Collected: 10/20/24 1945    Lab Status: Final result Specimen: Blood Updated: 10/20/24 1946     POC Glucose 74 mg/dl             CT head without contrast   Final Interpretation by Roderick Dee MD (10/20 2124)      No acute intracranial abnormality.      Chronic microangiopathic changes.                  Workstation performed: [id number]         CT cervical spine without contrast   Final Interpretation by Roderick Dee MD (10/20 2127)      No cervical spine fracture or traumatic malalignment.                  Workstation performed: [id number]         CT chest abdomen pelvis wo contrast   Final Interpretation by Roderick Dee MD (10/20 2155)      Although the study was limited by the lack of intravenous contrast, there is no gross evidence of solid organ injury.      Large right lower lobe consolidation with air bronchograms most compatible with bronchopneumonia, possibly aspiration pneumonia. Small consolidation with air bronchograms is also noted at the medial left lung base. Multifocal patchy groundglass densities    are noted throughout both lungs, right greater than left, also suspicious for an infectious or inflammatory process.      No pneumothorax.      Large hiatal hernia.      No definite acute osseous abnormality. However, the study was limited by patient motion.      Large right inguinal hernia containing nonobstructed loops of small bowel.      Workstation performed: [id number]         XR chest portable    (Results Pending)   XR chest portable ICU    (Results Pending)       CriticalCare Time    Date/Time: 10/20/2024 9:00 PM    Performed by: Annie Marin PA-C  Authorized by: Annie Marin PA-C    Critical care provider statement:     Critical care time (minutes):  60    Critical care time was exclusive of:  Separately  billable procedures and treating other patients    Critical care was necessary to treat or prevent imminent or life-threatening deterioration of the following conditions:  Circulatory failure, sepsis and respiratory failure    Critical care was time spent personally by me on the following activities:  Development of treatment plan with patient or surrogate, discussions with consultants, evaluation of patient's response to treatment, examination of patient, ordering and review of laboratory studies, ordering and review of radiographic studies, re-evaluation of patient's condition, review of old charts and ordering and performing treatments and interventions      ED Medication and Procedure Management   Prior to Admission Medications   Prescriptions Last Dose Informant Patient Reported? Taking?   Calcium 500-100 MG-UNIT CHEW   Yes No   Sig: Chew 1 tablet daily   acyclovir (ZOVIRAX) 400 MG tablet   Yes No   Sig: Take 400 mg by mouth 2 (two) times a day   aspirin 81 mg chewable tablet   Yes No   Sig: Chew 81 mg daily   metoprolol tartrate (LOPRESSOR) 100 mg tablet   Yes No   Sig: Take 100 mg by mouth 2 (two) times a day   morphine (MS CONTIN) 15 mg 12 hr tablet   Yes No   Sig: Take 15 mg by mouth every 12 (twelve) hours   omeprazole (PriLOSEC) 40 MG capsule   Yes No   Sig: TAKE 1 CAPSULE BY MOUTH DIARIAMENTE      Facility-Administered Medications: None     Current Discharge Medication List        CONTINUE these medications which have NOT CHANGED    Details   acyclovir (ZOVIRAX) 400 MG tablet Take 400 mg by mouth 2 (two) times a day      aspirin 81 mg chewable tablet Chew 81 mg daily      Calcium 500-100 MG-UNIT CHEW Chew 1 tablet daily      metoprolol tartrate (LOPRESSOR) 100 mg tablet Take 100 mg by mouth 2 (two) times a day      morphine (MS CONTIN) 15 mg 12 hr tablet Take 15 mg by mouth every 12 (twelve) hours      omeprazole (PriLOSEC) 40 MG capsule TAKE 1 CAPSULE BY MOUTH DIARIAMENTE           No discharge  procedures on file.  ED SEPSIS DOCUMENTATION   Time reflects when diagnosis was documented in both MDM as applicable and the Disposition within this note       Time User Action Codes Description Comment    10/20/2024  9:54 PM Debi Javier Add [D70.9,  R50.81] Neutropenic fever  (HCC)     10/20/2024  9:58 PM Sonali Marinica Add [A41.9,  R65.20] Severe sepsis (HCC)     10/20/2024  9:58 PM Farhad Marinssica Add [R41.82] Altered mental status     10/20/2024  9:59 PM Sonali Marinica Add [D70.9] Severe neutropenia (HCC)     10/20/2024  9:59 PM Farhad Marinssica Add [D64.9] Anemia     10/20/2024  9:59 PM Farhad Marinssica Add [N17.9] MELANIE (acute kidney injury) (HCC)     10/20/2024  9:59 PM Annie Marin Add [R79.89] Elevated troponin     10/20/2024 10:00 PM Annie Marin Add [J18.0] Bronchopneumonia     10/20/2024 10:16 PM Tito Braswell Modify [D70.9,  R50.81] Neutropenic fever  (HCC)     10/20/2024 10:16 PM Tito Braswell Add [A49.02] MRSA (methicillin resistant Staphylococcus aureus)     10/20/2024 10:41 PM Debi Javier Add [A41.9,  R65.21,  J96.01] Sepsis with acute hypoxic respiratory failure and septic shock ([facility])            Initial Sepsis Screening       Row Name 10/20/24 1923                Is the patient's history suggestive of a new or worsening infection? Yes (Proceed)  -JM        Suspected source of infection suspect infection, source unknown  -JM        Indicate SIRS criteria Hyperthemia > 38.3C (100.9F) OR Hypothermia <36C (96.8F);Tachycardia > 90 bpm;Tachypnea > 20 resp per min  -JM        Are two or more of the above signs & symptoms of infection both present and new to the patient? Yes (Proceed)  -JM        Assess for evidence of organ dysfunction: Are any of the below criteria present within 6 hours of suspected infection and SIRS criteria that are NOT considered to be chronic conditions? --                  User Key  (r) = Recorded By, (t) = Taken By, (c) = Cosigned By      Initials  Name Provider Type     Annie Marin PA-C Physician Assistant                       Annie Marin PA-C  10/21/24 0329       Annie Marin PA-C  10/21/24 0337

## 2024-10-20 NOTE — Clinical Note
Case was discussed with  and the patient's admission status was agreed to be  to the service of Dr. Masters

## 2024-10-21 NOTE — ASSESSMENT & PLAN NOTE
WBC count 0.23 with an ANC of 0.00  On broad spectrum antibiotics   May need to consider neupogen given ANC 0 and presence of infection with pneumonia and elevated procalcitonin  May need ID and heme-Onc assistance

## 2024-10-21 NOTE — PROGRESS NOTES
Progress Note - Critical Care/ICU   Name: Kash Solano 84 y.o. male I MRN: [id number]  Unit/Bed#: ICU 14 I Date of Admission: 10/20/2024   Date of Service: 10/21/2024 I Hospital Day: 1       Interval Events:   The patient was admitted with severe sepsis related to a right sided pneumonia. He was neutropenic with a WBC count of 0.23 and ANC of 0.00. He also had a hgb of 6.2. the patient began to deteriorate and required emergent intubation and was without pulses. At the time of intubation, there was a large amount of blood in the posterior pharynx welling up out of the mouth. An NGT was inserted with bloody output. Endotracheal suctioning revealed bloody drainage as well. After ROSC was achieved, labs were sent which revealed a hgb of 6.1, ptt> 210, INR> 8, fibrinogen < 60 consistent with DIC. Blood products were ordered including cryoprecipitate. Please see the code record for further details    Pertinent New Data:   blood pressure, pulse, and respirations    The patient was toxic appearing and mottled.  Heart was irregular, BS were diminished in the right side. Abd was soft, non tender. Neuro: he was awake at first, then somnolent  but not following commands   I have personally reviewed pertinent lab results., CBC:   Lab Results   Component Value Date    WBC 0.19 (L) 10/21/2024    HGB 6.1 (L) 10/21/2024    HCT 18 (L) 10/21/2024     (H) 10/21/2024    PLT 78 (L) 10/21/2024    RBC 2.05 (L) 10/21/2024    MCH 33.2 10/21/2024    MCHC 30.6 (L) 10/21/2024    RDW 17.7 (H) 10/21/2024    MPV 11.6 10/21/2024    NRBC 5 (H) 10/20/2024   , CMP:   Lab Results   Component Value Date    SODIUM 146 10/21/2024    K 5.0 10/21/2024     (H) 10/21/2024    CO2 22 10/21/2024    CO2 22 10/21/2024    BUN 36 (H) 10/21/2024    CREATININE 2.36 (H) 10/21/2024    GLUCOSE 58 (L) 10/21/2024    CALCIUM 8.3 (L) 10/21/2024     (H) 10/21/2024    ALT 98 (H) 10/21/2024    ALKPHOS 28 (L) 10/21/2024    EGFR 24 10/21/2024   , ABG:   Lab  "Results   Component Value Date    PHART 6.986 (LL) 10/21/2024    ERX2RQD 84.7 (HH) 10/21/2024    PO2ART 30.5 (LL) 10/21/2024    OXH4AKT 19.8 (L) 10/21/2024    BEART -11.6 10/21/2024    SOURCE Line, Arterial 10/21/2024   , PT/INR:   Lab Results   Component Value Date    INR 8.73 (HH) 10/21/2024   , Troponin: No results found for: \"TROPONINI\", Magnesium: No components found for: \"MAG\", Phosphorous:   Lab Results   Component Value Date    PHOS 7.9 (H) 10/21/2024     CT chestResults Review Statement: I personally reviewed the following image studies in PACS and associated radiology reports: CT chest and CT abdomen/pelvis. My interpretation of the radiology images/reports is: Dense consolidation in the right lung.    Assessment and Plan  Diagnosis: Sepsis with septic shock  Plan: Antibiotics as previously ordered  Vasopressors to maintain his MAP > 65    DIC:  Likely related to his neutropenic sepsis, underlying multiple myeloma.  -Will transfuse cryo, FFP , plts and PRBC  -treatment of his sepsis as previously described    Acute hypoxic respiratory failure  -intubated  Will continue mechanical ventilation for now    Goals of care: I discussed with his grandaughter the severity of the patients condition and that his prognosis is poor for recovery. She confirms she wishes for CPR for now as she gathers the family    Billing Level:  Critical Care Time Statement: Upon my evaluation, this patient had a high probability of imminent or life-threatening deterioration due to Septic shock, DIC, which required my direct attention, intervention, and personal management.  I spent a total of 50 minutes directly providing critical care services, including interpretation of complex medical databases, evaluating for the presence of life-threatening injuries or illnesses, management of organ system failure(s) , and complex medical decision making (to support/prevent further life-threatening deterioration).. This time is exclusive of " procedures, teaching, family meetings, and any prior time recorded by providers other than myself.      ITZ Das

## 2024-10-21 NOTE — ED PROVIDER NOTES
Pt Name: Kash Solano  MRN: [id number]  Birthdate 1940  Age/Sex: 84 y.o. male  Date of evaluation: 10/20/2024  PCP: No primary care provider on file.        FINAL IMPRESSION    Final diagnoses:   Severe sepsis (HCC)   Altered mental status   Severe neutropenia (HCC)   Anemia   MELANIE (acute kidney injury) (HCC)   Elevated troponin   Bronchopneumonia         DISPOSITION/PLAN    Time reflects when diagnosis was documented in both MDM as applicable and the Disposition within this note       Time User Action Codes Description Comment    10/20/2024  9:54 PM Debi Javier Add [D70.9,  R50.81] Neutropenic fever  (HCC)     10/20/2024  9:58 PM Sonali Marinica Add [A41.9,  R65.20] Severe sepsis (HCC)     10/20/2024  9:58 PM TaniaFarhad Kapoorssica Add [R41.82] Altered mental status     10/20/2024  9:59 PM Annie Marin Add [D70.9] Severe neutropenia (HCC)     10/20/2024  9:59 PM TaniaFarhad Kapoorssica Add [D64.9] Anemia     10/20/2024  9:59 PM Farhad Marinssica Add [N17.9] MELANIE (acute kidney injury) (HCC)     10/20/2024  9:59 PM Sonali Marinica Add [R79.89] Elevated troponin     10/20/2024 10:00 PM Annie Marin Add [J18.0] Bronchopneumonia     10/20/2024 10:16 PM Tito Braswell Modify [D70.9,  R50.81] Neutropenic fever  (HCC)     10/20/2024 10:16 PM Tito Braswell Add [A49.02] MRSA (methicillin resistant Staphylococcus aureus)           ED Disposition       ED Disposition   Admit    Condition   Stable    Date/Time   Sun Oct 20, 2024 10:00 PM    Comment   Case was discussed with critical care and the patient's admission status was agreed to be inpatient to the service of Dr. Arevalo.               Follow-up Information    None           PATIENT REFERRED TO:    No follow-up provider specified.    DISCHARGE MEDICATIONS:    Patient's Medications   Discharge Prescriptions    No medications on file       No discharge procedures on file.          CHIEF COMPLAINT    Chief Complaint   Patient presents with    Altered Mental  Status     BIBA- family states pt found this AM with AMS. Pt with tympanic temp of 105.8 and responsive to painful stimuli. Hx Bone Cancer         HPI    Kash presents to the Emergency Department complaining of fever and altered mental status.  On EMS arrival he was significantly less responsive with fever >105.  Per EMS family reports that the patient would not want intubation or mechanical ventilation but they did provide bag valve mask ventilation en route.      Patient's mental status improved with IVF and active cooling as well as antipyretics and antibiotics.      There were several family discussions regarding goals of care in the setting of septic shock in this patient.          HPI      Past Medical and Surgical History    Past Medical History:   Diagnosis Date    Bone cancer (HCC)     Hiatal hernia with GERD     History of chemotherapy     Pt goes for treatments monthly and has been on chemo tx for 5 years.    Hypertension     Iron deficiency anemia     Multiple myeloma (HCC)     Neutropenia (HCC)     Systolic murmur        History reviewed. No pertinent surgical history.    History reviewed. No pertinent family history.    Social History     Tobacco Use    Smoking status: Never    Smokeless tobacco: Never   Vaping Use    Vaping status: Never Used   Substance Use Topics    Alcohol use: Not Currently    Drug use: Never         .    Allergies    No Known Allergies    Home Medications    Prior to Admission medications    Medication Sig Start Date End Date Taking? Authorizing Provider   acyclovir (ZOVIRAX) 400 MG tablet Take 400 mg by mouth 2 (two) times a day 2/8/21   Historical Provider, MD   aspirin 81 mg chewable tablet Chew 81 mg daily    Historical Provider, MD   Calcium 500-100 MG-UNIT CHEW Chew 1 tablet daily    Historical Provider, MD   metoprolol tartrate (LOPRESSOR) 100 mg tablet Take 100 mg by mouth 2 (two) times a day 4/22/21 4/22/22  Historical Provider, MD   morphine (MS CONTIN) 15 mg 12 hr  tablet Take 15 mg by mouth every 12 (twelve) hours 4/27/21   Historical Provider, MD   omeprazole (PriLOSEC) 40 MG capsule TAKE 1 CAPSULE BY MOUTH DIARIAMENTE 3/25/21   Historical Provider, MD           Review of Systems    Review of Systems   Constitutional:  Positive for fatigue and fever. Negative for chills.   HENT:  Negative for ear pain and sore throat.    Eyes:  Negative for pain and visual disturbance.   Respiratory:  Negative for cough and shortness of breath.    Cardiovascular:  Negative for chest pain and palpitations.   Gastrointestinal:  Negative for abdominal pain and vomiting.   Genitourinary:  Negative for dysuria and hematuria.   Musculoskeletal:  Negative for arthralgias and back pain.   Skin:  Negative for color change and rash.   Neurological:  Negative for seizures and syncope.   Psychiatric/Behavioral:  Positive for confusion and decreased concentration. Negative for behavioral problems.    All other systems reviewed and are negative.        Physical Exam      ED Triage Vitals   Temperature Pulse Respirations Blood Pressure SpO2   10/20/24 1913 10/20/24 1912 10/20/24 1912 10/20/24 1915 10/20/24 1912   (!) 104.4 °F (40.2 °C) 100 (!) 28 (!) 71/35 (!) 84 %      Temp Source Heart Rate Source Patient Position - Orthostatic VS BP Location FiO2 (%)   10/20/24 1913 10/20/24 1912 10/20/24 1915 10/20/24 1915 --   Rectal Monitor Lying Left arm       Pain Score       10/20/24 1936       Med Not Given for Pain - for MAR use only               Physical Exam  Constitutional:       Appearance: He is ill-appearing, toxic-appearing and diaphoretic.   HENT:      Head: Normocephalic.   Musculoskeletal:         General: Swelling present.                Assessment and Plan    Kash Solano is a 84 y.o. male who presents with fever and altered mental status. Physical examination remarkable for ill appearance. Differential diagnosis (not completely inclusive) includes severe sepsis/ shock/ central or intracranial  process/ thyroid storm. Plan will be to perform diagnostic testing and treat symptomatically.      MDM    Diagnostic Results    EKG:  EKG INTERPRETATION  EKG Interpretation  Atrial fibrillation  EKG interpreted by me.   Interpretation by Marleen Cueto DO  EKG reviewed and interpreted independently.    Labs:    Results for orders placed or performed during the hospital encounter of 10/20/24   ECG 12 lead    Collection Time: 10/20/24  7:17 PM   Result Value Ref Range    Ventricular Rate 95 BPM    Atrial Rate 416 BPM    NC Interval  ms    QRSD Interval 82 ms    QT Interval 368 ms    QTC Interval 462 ms    P Axis  degrees    QRS Axis 29 degrees    T Wave Axis 31 degrees   FLU/COVID Rapid Antigen (30 min. TAT) - Preferred screening test in ED    Collection Time: 10/20/24  7:25 PM    Specimen: Nose; Nares   Result Value Ref Range    SARS COV Rapid Antigen Negative Negative    Influenza A Rapid Antigen Negative Negative    Influenza B Rapid Antigen Negative Negative   CBC and differential    Collection Time: 10/20/24  7:25 PM   Result Value Ref Range    WBC 0.23 (L) 4.31 - 10.16 Thousand/uL    RBC 1.80 (L) 3.88 - 5.62 Million/uL    Hemoglobin 6.2 (L) 12.0 - 17.0 g/dL    Hematocrit 19.2 (L) 36.5 - 49.3 %     (H) 82 - 98 fL    MCH 34.4 (H) 26.8 - 34.3 pg    MCHC 32.3 31.4 - 37.4 g/dL    RDW 17.1 (H) 11.6 - 15.1 %    MPV 11.2 8.9 - 12.7 fL    Platelets 81 (L) 149 - 390 Thousands/uL   Comprehensive metabolic panel    Collection Time: 10/20/24  7:25 PM   Result Value Ref Range    Sodium 136 135 - 147 mmol/L    Potassium 3.2 (L) 3.5 - 5.3 mmol/L    Chloride 110 (H) 96 - 108 mmol/L    CO2 19 (L) 21 - 32 mmol/L    ANION GAP 7 4 - 13 mmol/L    BUN 34 (H) 5 - 25 mg/dL    Creatinine 2.22 (H) 0.60 - 1.30 mg/dL    Glucose 76 65 - 140 mg/dL    Calcium 7.6 (L) 8.4 - 10.2 mg/dL    Corrected Calcium 9.0 8.3 - 10.1 mg/dL    AST 16 13 - 39 U/L    ALT 6 (L) 7 - 52 U/L    Alkaline Phosphatase 40 34 - 104 U/L    Total Protein  "3.7 (L) 6.4 - 8.4 g/dL    Albumin 2.3 (L) 3.5 - 5.0 g/dL    Total Bilirubin 0.67 0.20 - 1.00 mg/dL    eGFR 26 ml/min/1.73sq m   Lactic acid    Collection Time: 10/20/24  7:25 PM   Result Value Ref Range    LACTIC ACID 4.6 (HH) 0.5 - 2.0 mmol/L   Procalcitonin    Collection Time: 10/20/24  7:25 PM   Result Value Ref Range    Procalcitonin 78.53 (H) <=0.25 ng/ml   Protime-INR    Collection Time: 10/20/24  7:25 PM   Result Value Ref Range    Protime 19.5 (H) 12.3 - 15.0 seconds    INR 1.64 (H) 0.85 - 1.19   APTT    Collection Time: 10/20/24  7:25 PM   Result Value Ref Range    PTT 32 23 - 34 seconds   HS Troponin 0hr (reflex protocol)    Collection Time: 10/20/24  7:25 PM   Result Value Ref Range    hs TnI 0hr 85 (H) \"Refer to ACS Flowchart\"- see link ng/L   TSH, 3rd generation with Free T4 reflex    Collection Time: 10/20/24  7:25 PM   Result Value Ref Range    TSH 3RD GENERATON 0.799 0.450 - 4.500 uIU/mL   CK    Collection Time: 10/20/24  7:25 PM   Result Value Ref Range    Total  39 - 308 U/L   Manual Differential(PHLEBS Do Not Order)    Collection Time: 10/20/24  7:25 PM   Result Value Ref Range    Segmented % 2 (L) 43 - 75 %    Lymphocytes % 81 (H) 14 - 44 %    Monocytes % 14 (H) 4 - 12 %    Eosinophils % 0 0 - 6 %    Basophils % 1 0 - 1 %    Atypical Lymphocytes % 2 (H) <=0 %    Absolute Neutrophils 0.00 (L) 1.85 - 7.62 Thousand/uL    Absolute Lymphocytes 0.19 (L) 0.60 - 4.47 Thousand/uL    Absolute Monocytes 0.03 0.00 - 1.22 Thousand/uL    Absolute Eosinophils 0.00 0.00 - 0.40 Thousand/uL    Absolute Basophils 0.00 0.00 - 0.10 Thousand/uL    Total Counted      nRBC 5 (H) 0 - 2 /100 WBC    RBC Morphology Present     Platelet Estimate Decreased (A) Adequate    Poikilocytes Present    Fingerstick Glucose (POCT)    Collection Time: 10/20/24  7:45 PM   Result Value Ref Range    POC Glucose 74 65 - 140 mg/dl   UA w Reflex to Microscopic w Reflex to Culture    Collection Time: 10/20/24  8:00 PM    Specimen: " "Urine, Indwelling Valentin Catheter   Result Value Ref Range    Color, UA Yellow     Clarity, UA Clear     Specific Gravity, UA 1.015 1.003 - 1.030    pH, UA 5.5 4.5, 5.0, 5.5, 6.0, 6.5, 7.0, 7.5, 8.0    Leukocytes, UA Negative Negative    Nitrite, UA Negative Negative    Protein, UA 70 (1+) (A) Negative mg/dl    Glucose, UA Negative Negative mg/dl    Ketones, UA Negative Negative mg/dl    Urobilinogen, UA <2.0 <2.0 mg/dl mg/dl    Bilirubin, UA Negative Negative    Occult Blood, UA Small (A) Negative   Urine Microscopic    Collection Time: 10/20/24  8:00 PM   Result Value Ref Range    RBC, UA None Seen None Seen, 1-2 /hpf    WBC, UA 1-2 None Seen, 1-2 /hpf    Epithelial Cells Occasional None Seen, Occasional /hpf    Bacteria, UA Occasional None Seen, Occasional /hpf    MUCUS THREADS Moderate (A) None Seen    Hyaline Casts, UA Innumerable (A) None Seen /lpf   HS Troponin I 2hr    Collection Time: 10/20/24  9:27 PM   Result Value Ref Range    hs TnI 2hr 143 (H) \"Refer to ACS Flowchart\"- see link ng/L    Delta 2hr hsTnI 58 (H) <20 ng/L   Lactic acid 2 Hours    Collection Time: 10/20/24  9:27 PM   Result Value Ref Range    LACTIC ACID 6.0 (HH) 0.5 - 2.0 mmol/L   ECG 12 lead    Collection Time: 10/20/24  9:27 PM   Result Value Ref Range    Ventricular Rate 100 BPM    Atrial Rate 111 BPM    MS Interval  ms    QRSD Interval 100 ms    QT Interval 320 ms    QTC Interval 412 ms    P Axis  degrees    QRS Axis 19 degrees    T Wave Axis -18 degrees       All labs reviewed and utilized in the medical decision making process    Radiology:    CT head without contrast   Final Result      No acute intracranial abnormality.      Chronic microangiopathic changes.                  Workstation performed: [id number]         CT cervical spine without contrast   Final Result      No cervical spine fracture or traumatic malalignment.                  Workstation performed: [id number]         CT chest abdomen pelvis wo contrast   Final Result    "   Although the study was limited by the lack of intravenous contrast, there is no gross evidence of solid organ injury.      Large right lower lobe consolidation with air bronchograms most compatible with bronchopneumonia, possibly aspiration pneumonia. Small consolidation with air bronchograms is also noted at the medial left lung base. Multifocal patchy groundglass densities    are noted throughout both lungs, right greater than left, also suspicious for an infectious or inflammatory process.      No pneumothorax.      Large hiatal hernia.      No definite acute osseous abnormality. However, the study was limited by patient motion.      Large right inguinal hernia containing nonobstructed loops of small bowel.      Workstation performed: [id number]         XR chest portable    (Results Pending)       All radiology studies independently viewed by me and interpreted by the radiologist.    Procedure    CriticalCare Time    Date/Time: 10/20/2024 7:30 PM    Performed by: Marleen Cueto DO  Authorized by: Marleen Cueto DO    Critical care provider statement:     Critical care time (minutes):  30    Critical care time was exclusive of:  Separately billable procedures and treating other patients and teaching time    Critical care was necessary to treat or prevent imminent or life-threatening deterioration of the following conditions:  Sepsis, shock, circulatory failure and CNS failure or compromise    Critical care was time spent personally by me on the following activities:  Blood draw for specimens, obtaining history from patient or surrogate, development of treatment plan with patient or surrogate, discussions with consultants, evaluation of patient's response to treatment, examination of patient, interpretation of cardiac output measurements, ordering and performing treatments and interventions, ordering and review of laboratory studies, ordering and review of radiographic studies, re-evaluation  of patient's condition and review of old charts    I assumed direction of critical care for this patient from another provider in my specialty: no          ED Course of Care and Re-Assessments    Patient clinically improved with aggressive treatment initially.      He later became more hypoxic and was placed on BIPAP.   Pneumonia and likely superimposed volume overload from aggressive hydration.     Medications   cefepime (MAXIPIME) 1 g/50 mL dextrose IVPB (has no administration in time range)   vancomycin (VANCOCIN) IVPB (premix in dextrose) 1,000 mg 200 mL (has no administration in time range)   sodium chloride 0.9 % bolus 1,000 mL (0 mL Intravenous Stopped 10/20/24 2041)     Followed by   sodium chloride 0.9 % bolus 1,000 mL (0 mL Intravenous Stopped 10/20/24 2041)   ceftriaxone (ROCEPHIN) 2 g/50 mL in dextrose IVPB (0 mg Intravenous Stopped 10/20/24 2015)   acetaminophen (TYLENOL) rectal suppository 650 mg (650 mg Rectal Given 10/20/24 1936)   sodium chloride 0.9 % bolus 500 mL (0 mL Intravenous Stopped 10/20/24 2107)   cefepime (MAXIPIME) 2 g/50 mL dextrose IVPB (0 mg Intravenous Stopped 10/20/24 2144)     Patient was accepted on critical care service.                     Marleen Cueto, DO     Marleen Cueto,   10/21/24 [id number]

## 2024-10-21 NOTE — ASSESSMENT & PLAN NOTE
Likely related to pneumonia, predominantly right sided  Developed some pulmonary congestion during the fluid bolus administration so further fluids were cancelled  His BP has improved  May require vasopressor therapy to maintain MAP > 65  We will continue broad spectrum antibiotics including cefepime and vancomycin for now given his neutropenia  Will send sputum culture  May need to consider opportunistic infections given his immunocompromised status

## 2024-10-21 NOTE — CONSULTS
Vancomycin IV Pharmacy-to-Dose Consultation     Vancomycin has been discontinued.  Pharmacy will sign off.  Please contact or re-consult with questions.    Elen Mcmanus, Pharmacist

## 2024-10-21 NOTE — PROGRESS NOTES
Kash Solano is a 84 y.o. male who is currently ordered Vancomycin IV with management by the Pharmacy Consult service.  Relevant clinical data and objective / subjective history reviewed.  Vancomycin Assessment:  Indication and Goal AUC/Trough: Other, MRSA, -600, trough >10  Clinical Status: stable  Micro:   In progress  Renal Function:  SCr: 2.22 mg/dL  CrCl: 19.8 mL/min  Renal replacement: Not on dialysis  Days of Therapy: 1  Current Dose: Pt received 1500mg vanco load 10/20 about 11pm  Vancomycin Plan:  New Dosing: Pulse dosing 10mg/kg(500mg vanco) daily prn vanco level less than 15 mcg/ml  Next Level: 10/21 with am labs  Renal Function Monitoring: Daily BMP and UOP  Pharmacy will continue to follow closely for s/sx of nephrotoxicity, infusion reactions and appropriateness of therapy.  BMP and CBC will be ordered per protocol. We will continue to follow the patient’s culture results and clinical progress daily.    Tito Braswell, Pharmacist

## 2024-10-21 NOTE — ASSESSMENT & PLAN NOTE
Creatinine elevated above baseline of 1.48 at 2.22  Likely multifactorial related to sepsis, volume depletion from poor PO intake  Developed worsening respiratory symptoms in the ED following fluid administration so will be cautious with fluid administration  We will monitor his renal indices and urine output closely

## 2024-10-21 NOTE — DEATH NOTE
INPATIENT DEATH NOTE  Kash Solano 84 y.o. male MRN: [id number]  Unit/Bed#: ICU 14 Encounter: [id number]    Date, Time and Cause of Death    Date of Death: 10/21/24  Time of Death:  4:18 AM  Preliminary Cause of Death: Severe sepsis with septic shock  Entered by: Debi Javier[[email or web address]]       Attribution       [email or web address] ITZ Das 10/21/24 04:30             Patient's Information  Pronounced by: Debi Javier  Did the patient's death occur in the ED?: No  Did the patient's death occur in the OR?: No  Did the patient's death occur less than 10 days post-op?: No  Did the patient's death occur within 24 hours of admission?: Yes  Was code status DNR at the time of death?: Yes    PHYSICAL EXAM:  Unresponsive to noxious stimuli, Spontaneous respirations absent, Breath sounds absent, Carotid pulse absent, and Heart sounds absent    Medical Examiner notification criteria:  NONE APPLICABLE   Medical Examiner's office notified?:  Yes   Medical Examiner accepted case?:  No  Name of Medical Examiner: On call         Autopsy Options:  Post-mortem examination declined by next of kin    Primary Service Attending Physician notified?:  yes - Attending:  Sandra Arevalo*    Physician/Resident responsible for completing Discharge Summary:  Debi Javier

## 2024-10-21 NOTE — PROCEDURES
Arterial Line Insertion    Date/Time: 10/21/2024 4:32 AM    Performed by: ITZ Das  Authorized by: ITZ Das    Patient location:  Bedside  Consent:     Consent obtained:  Emergent situation  Universal protocol:     Immediately prior to procedure a time out was called: yes      Patient identity confirmed:  Arm band  Indications:     Indications: hemodynamic monitoring and multiple ABGs    Pre-procedure details:     Skin preparation:  Chlorhexidine  Anesthesia (see MAR for exact dosages):     Anesthesia method:  None  Procedure details:     Location / Tip of Catheter:  Femoral    Laterality:  Right    Placement technique:  Percutaneous and Seldinger    Number of attempts:  1    Successful placement: yes      Transducer: waveform confirmed    Post-procedure details:     Post-procedure:  Sutured    Patient tolerance of procedure:  Tolerated well, no immediate complications

## 2024-10-21 NOTE — PROCEDURES
Central Line Insertion    Date/Time: 10/21/2024 4:33 AM    Performed by: ITZ Das  Authorized by: ITZ Das    Patient location:  Bedside  Consent:     Consent obtained:  Emergent situation and verbal    Risks discussed:  Bleeding, infection and pneumothorax  Universal protocol:     Immediately prior to procedure, a time out was called: yes      Patient identity confirmed:  Arm band  Pre-procedure details:     Hand hygiene: Hand hygiene performed prior to insertion      Sterile barrier technique: All elements of maximal sterile technique followed      Skin preparation:  2% chlorhexidine  Indications:     Central line indications: medications requiring central line    Anesthesia (see MAR for exact dosages):     Anesthesia method:  Local infiltration    Local anesthetic:  Lidocaine 1% w/o epi  Procedure details:     Location:  Left internal jugular    Vessel type: vein      Laterality:  Left    Approach: percutaneous technique used      Patient position:  Flat    Landmarks identified: yes      Ultrasound guidance: yes      Ultrasound image availability:  Not obtained due to urgency    Sterile ultrasound techniques: Sterile gel and sterile probe covers were used      Manometry confirmation: yes      Number of attempts:  1    Successful placement: yes    Post-procedure details:     Post-procedure:  Line sutured    Assessment:  No pneumothorax on x-ray and blood return through all ports    Patient tolerance of procedure:  Tolerated well, no immediate complications

## 2024-10-21 NOTE — ED NOTES
Pt not tolerating BIPAP mask at this time, critical care nurse at bedside. Pt transitioned to high flow nasal cannula at this time.     Navya Berg RN  10/21/24 0019

## 2024-10-21 NOTE — H&P
H&P - Critical Care/ICU   Name: Kash Solano 84 y.o. male I MRN: [id number]  Unit/Bed#: ED-23 I Date of Admission: 10/20/2024   Date of Service: 10/20/2024 I Hospital Day: 0       Assessment & Plan  Sepsis with acute hypoxic respiratory failure and septic shock (HCC)  Likely related to pneumonia, predominantly right sided  Developed some pulmonary congestion during the fluid bolus administration so further fluids were cancelled  His BP has improved  May require vasopressor therapy to maintain MAP > 65  We will continue broad spectrum antibiotics including cefepime and vancomycin for now given his neutropenia  Will send sputum culture  May need to consider opportunistic infections given his immunocompromised status  Neutropenic fever  (HCC)  WBC count 0.23 with an ANC of 0.00  On broad spectrum antibiotics   May need to consider neupogen given ANC 0 and presence of infection with pneumonia and elevated procalcitonin  May need ID and heme-Onc assistance   Multiple myeloma (HCC)  [facility]/24 with previous infusion 9/3/24.  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  Pancytopenia (HCC)  Likely related to his known history of multiple myeloma  Received a unit of PRBC in the ED  May benefit from a heme-Onc consult  MELANIE (acute kidney injury) (HCC)  Creatinine elevated above baseline of 1.48 at 2.22  Likely multifactorial related to sepsis, volume depletion from poor PO intake  Developed worsening respiratory symptoms in the ED following fluid administration so will be cautious with fluid administration  We will monitor his renal indices and urine output closely  Paroxysmal atrial fibrillation (HCC)  On  amiodarone as an outpatient  Essential hypertension  Will hold his outpatient metoprolol for now  MRSA (methicillin resistant Staphylococcus aureus)    Disposition: Stepdown Level 1    History of Present Illness   Kash Solano is a 84 y.o. who presents with complaints of altered mental status being reported by family and being found on the floor. All information is obtained from reviewing the chart and discussions with the ED staff as the patient is confused and Norwegian speaking only. He also had a fever to 104. In the ED he had an increased WOB and was placed on BIPAP. He had pancytopenia and an elevated lactic acid with a concern for sepsis. During the resuscitation, it was felt that his respiratory status worsened and the fluids were discontinued with about 500 cc left to infuse. His blood pressure did respond to the fluids he had gotten. CT imaging revealed a predominant right sided consolidation with air bronchogram's  concerning for pneumonia.     History obtained from chart review and unobtainable from patient due to mental status.  Review of Systems: Review of Systems   Unable to perform ROS: Mental status change       Historical Information   Past Medical History:  No date: Bone cancer (HCC)  No date: Hiatal hernia with GERD  No date: History of chemotherapy      Comment:  Pt goes for treatments monthly and has been on chemo tx                for 5 years.  No date: Hypertension  No date: Iron deficiency anemia  No date: Multiple myeloma (HCC)  No date: Neutropenia (HCC)  No date: Systolic murmur No past surgical history on file.   Current Outpatient Medications   Medication Instructions    acyclovir (ZOVIRAX) 400 mg, Oral, 2 times daily    aspirin 81 mg, Oral, Daily    Calcium 500-100 MG-UNIT CHEW 1 tablet, Oral, Daily    metoprolol tartrate (LOPRESSOR) 100 mg, Oral, 2 times daily    morphine (MS CONTIN) 15 mg, Oral, Every 12 hours    omeprazole (PriLOSEC) 40 MG capsule TAKE 1 CAPSULE BY MOUTH DIARIAMENTE     No Known Allergies   Social History     Tobacco Use    Smoking status: Never    Smokeless tobacco: Never   Vaping Use    Vaping status: Never Used   Substance Use Topics    Alcohol use: Not Currently    Drug use: Never    History reviewed. No pertinent family history.       Objective :                   Vitals I/O      Most Recent Min/Max in 24hrs   Temp 98.6 °F (37 °C) Temp  Min: 98.6 °F (37 °C)  Max: 104.4 °F (40.2 °C)   Pulse 90 Pulse  Min: 90  Max: 114   Resp (!) 28 Resp  Min: 24  Max: 30   /54 BP  Min: 71/35  Max: 134/94   O2 Sat 94 % SpO2  Min: 84 %  Max: 99 %      Intake/Output Summary (Last 24 hours) at 10/20/2024 2308  Last data filed at 10/20/2024 2144  Gross per 24 hour   Intake 2650 ml   Output 10 ml   Net 2640 ml       No diet orders on file    Invasive Monitoring           Physical Exam   Physical Exam  Eyes:      Pupils: Pupils are equal, round, and reactive to light.   Skin:     General: Skin is warm and dry.   HENT:      Head: Normocephalic.      Mouth/Throat:      Mouth: Mucous membranes are dry.   Cardiovascular:      Rate and Rhythm: Normal rate.   Abdominal:      Palpations: Abdomen is soft.      Tenderness: There is no abdominal tenderness.   Constitutional:       Appearance: He is ill-appearing.   Pulmonary:      Effort: Tachypnea present.      Breath sounds: Rales present.   Neurological:      Comments: Awake but confused, moves all extremities          Diagnostic Studies      Lab Results: I have reviewed the following results:     Medications:  Scheduled PRN   [START ON 10/21/2024] cefepime, 1,000 mg, Q12H  vancomycin, 25 mg/kg (Adjusted), Q24H          Continuous          Labs:   CBC    Recent Labs     10/20/24  1925   WBC 0.23*   HGB 6.2*   HCT 19.2*   PLT 81*     BMP    Recent Labs     10/20/24  1925   SODIUM 136   K 3.2*   *   CO2 19*   AGAP 7   BUN 34*   CREATININE 2.22*   CALCIUM 7.6*       Coags    Recent Labs     10/20/24  1925   INR 1.64*   PTT 32        Additional  Electrolytes  No recent results       Blood Gas    No recent results  No recent results LFTs  Recent Labs     10/20/24  1925   ALT 6*   AST 16   ALKPHOS 40   ALB 2.3*   TBILI 0.67       Infectious  Recent Labs     10/20/24  1925   PROCALCITONI 78.53*     Glucose  Recent Labs     10/20/24  1925   GLUC 76

## 2024-10-21 NOTE — ASSESSMENT & PLAN NOTE
Treated at [facility] for MM starting 7/22/24 with previous infusion 9/3/24.  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

## 2024-10-21 NOTE — ASSESSMENT & PLAN NOTE
Likely related to his known history of multiple myeloma  Received a unit of PRBC in the ED  May benefit from a heme-Onc consult

## 2024-10-23 LAB
BACTERIA BLD CULT: ABNORMAL
BACTERIA BLD CULT: ABNORMAL
GRAM STN SPEC: ABNORMAL
GRAM STN SPEC: ABNORMAL

## 2024-10-23 NOTE — CLINICAL RISK MANAGEMENT
Progress Note - Death in Restraints   Kash Solano 84 y.o. male MRN: [id number]  Unit/Bed#: ICU 14 Encounter: [id number]      Patient  within 24 hours of restraint  with Soft restraint right wrist and Soft restraint left wrist. Death unrelated to use of restraints. This situation was tracked internally. CMS and PA-SINDHU  notification not required.